# Patient Record
Sex: FEMALE | Race: WHITE | NOT HISPANIC OR LATINO | Employment: FULL TIME | URBAN - METROPOLITAN AREA
[De-identification: names, ages, dates, MRNs, and addresses within clinical notes are randomized per-mention and may not be internally consistent; named-entity substitution may affect disease eponyms.]

---

## 2018-10-31 ENCOUNTER — OFFICE VISIT (OUTPATIENT)
Dept: URGENT CARE | Facility: CLINIC | Age: 28
End: 2018-10-31
Payer: COMMERCIAL

## 2018-10-31 VITALS
HEIGHT: 63 IN | SYSTOLIC BLOOD PRESSURE: 115 MMHG | TEMPERATURE: 98 F | WEIGHT: 150 LBS | DIASTOLIC BLOOD PRESSURE: 83 MMHG | BODY MASS INDEX: 26.58 KG/M2 | RESPIRATION RATE: 16 BRPM | HEART RATE: 87 BPM | OXYGEN SATURATION: 98 %

## 2018-10-31 DIAGNOSIS — S96.911A STRAIN OF RIGHT FOOT, INITIAL ENCOUNTER: ICD-10-CM

## 2018-10-31 DIAGNOSIS — S93.401A SPRAIN OF RIGHT ANKLE, UNSPECIFIED LIGAMENT, INITIAL ENCOUNTER: ICD-10-CM

## 2018-10-31 DIAGNOSIS — M25.571 PAIN IN JOINT INVOLVING RIGHT ANKLE AND FOOT: Primary | ICD-10-CM

## 2018-10-31 PROCEDURE — 3008F BODY MASS INDEX DOCD: CPT | Mod: CPTII,S$GLB,, | Performed by: FAMILY MEDICINE

## 2018-10-31 PROCEDURE — 99203 OFFICE O/P NEW LOW 30 MIN: CPT | Mod: S$GLB,,, | Performed by: FAMILY MEDICINE

## 2018-10-31 PROCEDURE — 73610 X-RAY EXAM OF ANKLE: CPT | Mod: FY,RT,S$GLB, | Performed by: RADIOLOGY

## 2018-10-31 PROCEDURE — 73630 X-RAY EXAM OF FOOT: CPT | Mod: FY,RT,S$GLB, | Performed by: RADIOLOGY

## 2018-10-31 NOTE — LETTER
October 31, 2018      [facility]  [address] [address]  Phone: [phone]  Fax: [phone]       Patient: Geni Brownlee   YOB: 1990  Date of Visit: 10/31/2018    To Whom It May Concern:    Digna Brownlee  was at [facility] on 10/31/2018. She may return to work/school on 11/1/18 with restrictions for 2 weeks:  She may need to sit as needed and elevate right ankle and foot as needed.  If you have any questions or concerns, or if I can be of further assistance, please do not hesitate to contact me.    Sincerely,            Julissa Marion MD

## 2018-10-31 NOTE — PROGRESS NOTES
"Subjective:       Patient ID: Geni Brownlee is a 27 y.o. female.    Vitals:  height is 5' 3" (1.6 m) and weight is 68 kg (150 lb). Her temperature is 97.6 °F (36.4 °C). Her blood pressure is 115/83 and her pulse is 87. Her respiration is 16 and oxygen saturation is 98%.     Chief Complaint: Ankle Pain    Patient is local, twisted right ankle yesterday. Stepped off curb to get into [employer] and twisted ankle the wrong way. Limited ROM with pain that shoots up to her calf. Denies taking any medication for pain, only iced and elevated foot. States her calf is tingling-no numbness. Painful to touch. Denies any Hx of fractures/injuries to right ankle before.       Ankle Pain    The incident occurred 12 to 24 hours ago. The incident occurred at home. The injury mechanism was a twisting injury. The pain is present in the right ankle. Quality: Dull/tingling. The pain is at a severity of 5/10. The pain is mild. The pain has been constant since onset. Associated symptoms include a loss of motion and tingling. Pertinent negatives include no inability to bear weight, loss of sensation, muscle weakness or numbness. She has tried nothing for the symptoms.     Review of Systems   Constitution: Negative for weakness and malaise/fatigue.   HENT: Negative for nosebleeds.    Cardiovascular: Negative for chest pain and syncope.   Respiratory: Negative for shortness of breath.    Musculoskeletal: Positive for joint pain and stiffness. Negative for back pain and neck pain.   Gastrointestinal: Negative for abdominal pain.   Genitourinary: Negative for hematuria.   Neurological: Positive for tingling. Negative for dizziness and numbness.   All other systems reviewed and are negative.      Objective:      Physical Exam   Constitutional: She is oriented to person, place, and time. She appears well-developed and well-nourished. She is cooperative.  Non-toxic appearance. She does not appear ill. No distress.   HENT:   Head: Normocephalic and " atraumatic.   Right Ear: Hearing, tympanic membrane, external ear and ear canal normal.   Left Ear: Hearing, tympanic membrane, external ear and ear canal normal.   Nose: Nose normal. No mucosal edema, rhinorrhea or nasal deformity. No epistaxis. Right sinus exhibits no maxillary sinus tenderness and no frontal sinus tenderness. Left sinus exhibits no maxillary sinus tenderness and no frontal sinus tenderness.   Mouth/Throat: Uvula is midline, oropharynx is clear and moist and mucous membranes are normal. No trismus in the jaw. Normal dentition. No uvula swelling. No posterior oropharyngeal erythema.   Eyes: Conjunctivae, EOM and lids are normal. Right eye exhibits no discharge. Left eye exhibits no discharge. No scleral icterus.   Sclera clear bilat   Neck: Trachea normal, normal range of motion, full passive range of motion without pain and phonation normal. Neck supple.   Cardiovascular: Normal rate, regular rhythm, normal heart sounds, intact distal pulses and normal pulses.   Pulmonary/Chest: Effort normal and breath sounds normal. No stridor. No respiratory distress. She has no wheezes.   Abdominal: Normal appearance. She exhibits no pulsatile midline mass.   Musculoskeletal: She exhibits edema.   She is able to bear weight, wearing her slip on shoes, although with discomfort. Point tenderness under right lateral malleolus, and also over lateral aspect of midfoot.   Pain with ankle inversion, although good endpoint.  Peripheral pulses present and equal.   Neurological: She is alert and oriented to person, place, and time. She exhibits normal muscle tone. Coordination normal.   Skin: Skin is warm, dry and intact. She is not diaphoretic. No pallor.   Psychiatric: She has a normal mood and affect. Her speech is normal and behavior is normal. Judgment and thought content normal. Cognition and memory are normal.   Nursing note and vitals reviewed.      XRAY:  No fracture or dislocation  Assessment:       1. Pain in  joint involving right ankle and foot    2. Sprain of right ankle, unspecified ligament, initial encounter    3. Strain of right foot, initial encounter        Plan:         Pain in joint involving right ankle and foot  -     X-Ray Ankle Complete Right  -     X-Ray Foot Complete 3 view Right; Future; Expected date: 10/31/2018    Sprain of right ankle, unspecified ligament, initial encounter  -     ORTHOTIC DEVICE (DME)    Strain of right foot, initial encounter  -     ORTHOTIC DEVICE (DME)     IBUPROFEN 600 MG EVERY 6-8 HOURS AS NEEDED.     USE THE BOOT AS NEEDED, AND THEN ACTIVITY WITHOUT THE BOOT AS TOLERATED.    I PROVIDED A NOTE FOR WORK FOR 2 WEEKS.  SHE MAY NEED TO SIT AS NEEDED      FOLLOW-UP WITH YOUR PRIMARY CARE DOCTOR IN 2 WEEKS IF ANY DISCOMFORT AT ALL.      Make sure that you follow up with your primary care doctor in the next 2-5 days if needed .  Return to the Urgent Care if signs or symptoms change and certainly if you have worsening symptoms go to the nearest emergency department for further evaluation.

## 2018-10-31 NOTE — PATIENT INSTRUCTIONS
Treating Ankle Sprains  Treatment will depend on how bad your sprain is. For a severe sprain, healing may take 3 months or more.  Right after your injury: Use R.I.C.E.  · Rest: At first, keep weight off the ankle as much as you can. You may be given crutches to help you walk without putting weight on the ankle.  · Ice: Put an ice pack on the ankle for 15 minutes. Remove the pack and wait at least 30 minutes. Repeat for up to 3 days. This helps reduce swelling.  · Compression: To reduce swelling and keep the joint stable, you may need to wrap the ankle with an elastic bandage. For more severe sprains, you may need an ankle brace or a cast.  · Elevation: To reduce swelling, keep your ankle raised above your heart when you sit or lie down.  Medicine  Your healthcare provider may suggest oral non-steroidal anti-inflammatory medicine (NSAIDs), such as ibuprofen. This relieves the pain and helps reduce any swelling. Be sure to take your medicine as directed.  Contrast baths  After 3 days, soak your ankle in warm water for 30 seconds, then in cool water for 30 seconds. Go back and forth for 5 minutes. Doing this every 2 hours will help keep the swelling down.  Exercises    After about 2 to 3 weeks, you may be given exercises to strengthen the ligaments and muscles in the ankle. Doing these exercises will help prevent another ankle sprain. Exercises may include standing on your toes and then on your heels and doing ankle curls.  Ankle curls  · Sit on the edge of a sturdy table or lie on your back.  · Pull your toes toward you. Then point them away from you. Repeat for 2 to 3 minutes.   Date Last Reviewed: 9/28/2015  © 5342-7734 [employer]. [address] [place] [address], [place] [address]. All rights reserved. This information is not intended as a substitute for professional medical care. Always follow your healthcare professional's instructions.    IBUPROFEN 600 MG EVERY 6-8 HOURS AS NEEDED    USE THE BOOT AS  NEEDED, AND THEN ACTIVITY WITHOUT THE BOOT AS TOLERATED.    FOLLOW-UP WITH YOUR PRIMARY CARE DOCTOR IN 2 WEEKS IF ANY DISCOMFORT AT ALL.      Make sure that you follow up with your primary care doctor in the next 2-5 days if needed .  Return to the Urgent Care if signs or symptoms change and certainly if you have worsening symptoms go to the nearest emergency department for further evaluation.

## 2018-11-03 ENCOUNTER — TELEPHONE (OUTPATIENT)
Dept: URGENT CARE | Facility: CLINIC | Age: 28
End: 2018-11-03

## 2019-01-26 ENCOUNTER — OFFICE VISIT (OUTPATIENT)
Dept: URGENT CARE | Facility: CLINIC | Age: 29
End: 2019-01-26
Payer: COMMERCIAL

## 2019-01-26 VITALS
HEART RATE: 88 BPM | SYSTOLIC BLOOD PRESSURE: 132 MMHG | HEIGHT: 63 IN | BODY MASS INDEX: 26.58 KG/M2 | OXYGEN SATURATION: 97 % | TEMPERATURE: 98 F | WEIGHT: 150 LBS | DIASTOLIC BLOOD PRESSURE: 87 MMHG | RESPIRATION RATE: 16 BRPM

## 2019-01-26 DIAGNOSIS — H01.001 BLEPHARITIS OF RIGHT UPPER EYELID, UNSPECIFIED TYPE: Primary | ICD-10-CM

## 2019-01-26 PROCEDURE — 3008F BODY MASS INDEX DOCD: CPT | Mod: CPTII,S$GLB,, | Performed by: NURSE PRACTITIONER

## 2019-01-26 PROCEDURE — 99214 PR OFFICE/OUTPT VISIT, EST, LEVL IV, 30-39 MIN: ICD-10-PCS | Mod: S$GLB,,, | Performed by: NURSE PRACTITIONER

## 2019-01-26 PROCEDURE — 99214 OFFICE O/P EST MOD 30 MIN: CPT | Mod: S$GLB,,, | Performed by: NURSE PRACTITIONER

## 2019-01-26 PROCEDURE — 3008F PR BODY MASS INDEX (BMI) DOCUMENTED: ICD-10-PCS | Mod: CPTII,S$GLB,, | Performed by: NURSE PRACTITIONER

## 2019-01-26 RX ORDER — POLYMYXIN B SULFATE AND TRIMETHOPRIM 1; 10000 MG/ML; [USP'U]/ML
1 SOLUTION OPHTHALMIC EVERY 4 HOURS
Qty: 2 ML | Refills: 0 | Status: SHIPPED | OUTPATIENT
Start: 2019-01-26 | End: 2019-01-31

## 2019-01-26 NOTE — PROGRESS NOTES
"Subjective:       Patient ID: Geni Brownlee is a 28 y.o. female.    Vitals:  height is 5' 3" (1.6 m) and weight is 68 kg (150 lb). Her temperature is 98.1 °F (36.7 °C). Her blood pressure is 132/87 and her pulse is 88. Her respiration is 16 and oxygen saturation is 97%.     Chief Complaint: Eye Problem (Right eye)    Patient is local, here for right eye issue. States 3-4 days eye has been swollen. States her eye lid is painful-hurts to blink. Has a white dot on the upper lid the other day and it went away. Eye is slightly itchy, had some discharge and crustiness when she woke up this morning. Tried using a ice pack last night with no relief. Denies trying antihistamine for symptoms. Pain has gotten worse since it started/swelling. Eye was sensitive to light last night but has gone away now.       Eye Problem    The right eye is affected. This is a new problem. The current episode started in the past 7 days. The problem occurs constantly. The problem has been unchanged. There was no injury mechanism. The pain is at a severity of 6/10. The pain is mild. There is no known exposure to pink eye. She does not wear contacts. Associated symptoms include an eye discharge and itching. Pertinent negatives include no blurred vision, double vision, eye redness, fever, foreign body sensation, nausea, photophobia, recent URI or vomiting. Treatments tried: ice pack. The treatment provided no relief.       Constitution: Negative for chills and fever.   HENT: Negative for congestion and sinus pain.    Eyes: Positive for eye discharge, eye itching, eye pain and eyelid swelling. Negative for eye trauma, foreign body in eye, eye redness, photophobia, vision loss, double vision and blurred vision.   Gastrointestinal: Negative for nausea and vomiting.   Genitourinary: Negative for history of kidney stones.   Skin: Negative for rash.   Allergic/Immunologic: Negative for seasonal allergies and itching.   Neurological: Negative for " headaches.       Objective:      Physical Exam   Constitutional: She is oriented to person, place, and time. She appears well-developed and well-nourished.   HENT:   Head: Normocephalic and atraumatic.   Right Ear: External ear normal.   Left Ear: External ear normal.   Nose: Nose normal.   Mouth/Throat: Oropharynx is clear and moist.   Eyes: Conjunctivae and EOM are normal. Pupils are equal, round, and reactive to light. Lids are everted and swept, no foreign bodies found.       Neck: Trachea normal, full passive range of motion without pain and phonation normal. Neck supple.   Musculoskeletal: Normal range of motion.   Neurological: She is alert and oriented to person, place, and time.   Skin: Skin is warm, dry and intact.   Psychiatric: She has a normal mood and affect. Her speech is normal and behavior is normal. Judgment and thought content normal. Cognition and memory are normal.   Nursing note and vitals reviewed.      Assessment:       1. Blepharitis of right upper eyelid, unspecified type        Plan:         Blepharitis of right upper eyelid, unspecified type    Other orders  -     polymyxin B sulf-trimethoprim (POLYTRIM) 10,000 unit- 1 mg/mL Drop; Place 1 drop into both eyes every 4 (four) hours. for 5 days  Dispense: 2 mL; Refill: 0        Treating Blepharitis: Self-Care    To treat the problem, keep your eyelids clean. Warm compresses can reduce redness and swelling, and help clean your eyelids, too. You may also need to wash the area gently with an eyelid scrub when you wake up.  To apply a warm compress:  1. Wash your hands with soap and warm water.  2. Wet a clean washcloth with warm water. Then wring it out.  3. Close your eyes and place the washcloth over your eyelids for 3 to 5 minutes. This helps loosen scales or crusts.  4. Wet the washcloth again as often as needed to keep it warm.  Repeat 2 or more times a day. Use a clean washcloth each time.     To use an eyelid scrub:  1. Wash your hands  with soap and warm water.  2. Use a ready-made eyelid scrub. Or mix 3 drops of baby shampoo in 1/4 cup of warm water.  3. Dip a lint-free pad, cotton swab, or clean washcloth in the scrub.  4. Close one eye and gently scrub the base of the eyelid.  5. Rinse the lid in cool water and dry with a clean towel.  6. Repeat on your other eye.  Date Last Reviewed: 6/6/2015  © 4229-1681 [employer]. [address] [place] [address]. All rights reserved. This information is not intended as a substitute for professional medical care. Always follow your healthcare professional's instructions.      EYE   If your condition worsens or fails to improve we recommend that you receive another evaluation at the ER immediately or contact your PCP to discuss your concerns or return here. You must understand that you've received an urgent care treatment only and that you may be released before all your medical problems are known or treated. You the patient will arrange for followup care as instructed.   Use the eye drops as prescribed while awake initially. Use the eye drops for 24 hours after the last day of eye symptoms.   Do not wear your contact lens ( if you use them) for at least 5 days after you stop having symptoms and are rechecked by your doctor. Throw away the contacts, contact solution and carrying case you were using and start with new material. You may also need to throw away any eye makeup/mascara that you used while your eye was irritated.  If you develop increase eye symptoms or change in your vision seek medical care immediately either with your ophthalomologist or the ER or return here.

## 2019-01-26 NOTE — PATIENT INSTRUCTIONS
Treating Blepharitis: Self-Care    To treat the problem, keep your eyelids clean. Warm compresses can reduce redness and swelling, and help clean your eyelids, too. You may also need to wash the area gently with an eyelid scrub when you wake up.  To apply a warm compress:  1. Wash your hands with soap and warm water.  2. Wet a clean washcloth with warm water. Then wring it out.  3. Close your eyes and place the washcloth over your eyelids for 3 to 5 minutes. This helps loosen scales or crusts.  4. Wet the washcloth again as often as needed to keep it warm.  Repeat 2 or more times a day. Use a clean washcloth each time.     To use an eyelid scrub:  1. Wash your hands with soap and warm water.  2. Use a ready-made eyelid scrub. Or mix 3 drops of baby shampoo in 1/4 cup of warm water.  3. Dip a lint-free pad, cotton swab, or clean washcloth in the scrub.  4. Close one eye and gently scrub the base of the eyelid.  5. Rinse the lid in cool water and dry with a clean towel.  6. Repeat on your other eye.  Date Last Reviewed: 6/6/2015  © 9177-0066 [employer]. [address] [place] [address], [place] [address]. All rights reserved. This information is not intended as a substitute for professional medical care. Always follow your healthcare professional's instructions.      EYE   If your condition worsens or fails to improve we recommend that you receive another evaluation at the ER immediately or contact your PCP to discuss your concerns or return here. You must understand that you've received an urgent care treatment only and that you may be released before all your medical problems are known or treated. You the patient will arrange for followup care as instructed.   Use the eye drops as prescribed while awake initially. Use the eye drops for 24 hours after the last day of eye symptoms.   Do not wear your contact lens ( if you use them) for at least 5 days after you stop having symptoms and are rechecked by your  doctor. Throw away the contacts, contact solution and carrying case you were using and start with new material. You may also need to throw away any eye makeup/mascara that you used while your eye was irritated.  If you develop increase eye symptoms or change in your vision seek medical care immediately either with your ophthalomologist or the ER or return here.

## 2019-01-29 ENCOUNTER — TELEPHONE (OUTPATIENT)
Dept: URGENT CARE | Facility: CLINIC | Age: 29
End: 2019-01-29

## 2019-07-22 ENCOUNTER — OFFICE VISIT (OUTPATIENT)
Dept: URGENT CARE | Facility: CLINIC | Age: 29
End: 2019-07-22
Payer: COMMERCIAL

## 2019-07-22 VITALS
OXYGEN SATURATION: 98 % | WEIGHT: 150 LBS | BODY MASS INDEX: 26.58 KG/M2 | HEART RATE: 86 BPM | DIASTOLIC BLOOD PRESSURE: 92 MMHG | SYSTOLIC BLOOD PRESSURE: 142 MMHG | RESPIRATION RATE: 18 BRPM | TEMPERATURE: 98 F | HEIGHT: 63 IN

## 2019-07-22 DIAGNOSIS — L73.9 FOLLICULITIS: Primary | ICD-10-CM

## 2019-07-22 PROCEDURE — 99214 PR OFFICE/OUTPT VISIT, EST, LEVL IV, 30-39 MIN: ICD-10-PCS | Mod: S$GLB,,, | Performed by: SURGERY

## 2019-07-22 PROCEDURE — 3008F BODY MASS INDEX DOCD: CPT | Mod: CPTII,S$GLB,, | Performed by: SURGERY

## 2019-07-22 PROCEDURE — 99214 OFFICE O/P EST MOD 30 MIN: CPT | Mod: S$GLB,,, | Performed by: SURGERY

## 2019-07-22 PROCEDURE — 3008F PR BODY MASS INDEX (BMI) DOCUMENTED: ICD-10-PCS | Mod: CPTII,S$GLB,, | Performed by: SURGERY

## 2019-07-22 RX ORDER — DOXYCYCLINE 100 MG/1
100 CAPSULE ORAL 2 TIMES DAILY
Qty: 14 CAPSULE | Refills: 0 | Status: SHIPPED | OUTPATIENT
Start: 2019-07-22 | End: 2019-07-29

## 2019-07-22 RX ORDER — MUPIROCIN 20 MG/G
OINTMENT TOPICAL DAILY
Qty: 1 TUBE | Refills: 0 | Status: SHIPPED | OUTPATIENT
Start: 2019-07-22 | End: 2021-08-24

## 2019-07-22 NOTE — PATIENT INSTRUCTIONS
Folliculitis  Folliculitis is an inflammation of a hair follicle. A hair follicle is the little pocket where a hair grows out of the skin. Bacteria normally live on the skin. But sometimes bacteria can get trapped in a follicle and cause infection. This causes a bumpy rash. The area over the follicles is red and raised. It may itch or be painful. The bumps may have fluid (pus) inside. The pus may leak and then form crusts. Sores can spread to other areas of the body. Once it goes away, folliculitis can come back at any time. Severe cases may cause permanent hair loss and scarring.  Folliculitis can happen anywhere on the body where hair grows. It can be caused by rubbing from tight clothing. Ingrown hairs can cause it. Soaking in a hot tub or swimming pool that has bacteria in the water can cause it. It may also occur if a hair follicle is blocked by a bandage.  Sores often go away in a few days with no treatment. In some cases, medicine may be given. A small piece of skin or pus may be taken to find the type of bacteria causing the infection.  Home care  The healthcare provider may prescribe an antibiotic cream or ointment.  Oral antibiotics may also be prescribed. Or you may be told to use an over-the-counter antibiotic cream. Follow all instructions when using any of these medicines.  General care:  · Apply warm, moist compresses to the sores for 20 minutes up to 3 times a day. You can make a compress by soaking a cloth in warm water. Squeeze out excess water.  · Dont cut, poke, or squeeze the sores. This can be painful and spread infection.  · Dont scratch the affected area. Scratching can delay healing.  · Dont shave the areas affected by folliculitis.  · If the sores leak fluid, cover the area with a nonstick gauze bandage. Use as little tape as possible. Carefully discard all soiled bandages.  · Dress in loose cotton clothing.  · Change sheets and blankets if they are soiled by pus. Wash all clothes,  towels, sheets, and cloth diapers in soap and hot water. Do not share clothes, towels, or sheets with other family members.  · Do not soak the sores in bath water. This can spread infection. Instead, keep the area clean by gently washing sores with soap and warm water.  · Wash your hands or use antibacterial gels often to prevent spreading the bacteria.  Follow-up care  Follow up with your healthcare provider, or as advised.  When to seek medical advice  Call your healthcare provider right away if any of these occur:  · Fever of 100.4°F (38°C) or higher  · Spreading of the rash  · Rash does not get better with treatment  · Redness or swelling that gets worse  · Rash becomes more painful  · Foul-smelling fluid leaking from the skin  · Rash improves, but then comes back   Date Last Reviewed: 11/1/2016  © 6624-9553 [employer]. [address] [place] [address], [place] [address]. All rights reserved. This information is not intended as a substitute for professional medical care. Always follow your healthcare professional's instructions.

## 2019-07-22 NOTE — PROGRESS NOTES
"Subjective:       Patient ID: Geni Brownlee is a 28 y.o. female.    Vitals:  height is 5' 3" (1.6 m) and weight is 68 kg (150 lb). Her oral temperature is 98.1 °F (36.7 °C). Her blood pressure is 142/92 (abnormal) and her pulse is 86. Her respiration is 18 and oxygen saturation is 98%.     Chief Complaint: Rash    Rash   This is a new problem. The current episode started in the past 7 days (Saturday). The problem has been gradually worsening since onset. The affected locations include the groin and abdomen. The rash is characterized by pain and redness. It is unknown if there was an exposure to a precipitant. Pertinent negatives include no cough, fever or sore throat. Past treatments include antibiotic cream. The treatment provided no relief.       Constitution: Negative for chills and fever.   HENT: Negative for facial swelling and sore throat.    Neck: Negative for painful lymph nodes.   Eyes: Negative for eye itching and eyelid swelling.   Respiratory: Negative for cough.    Musculoskeletal: Positive for pain. Negative for joint pain and joint swelling.   Skin: Positive for rash. Negative for color change, pale, wound, abrasion, laceration, lesion, skin thickening/induration, puncture wound, erythema, bruising, abscess, avulsion and hives.   Allergic/Immunologic: Negative for environmental allergies, immunocompromised state and hives.   Hematologic/Lymphatic: Negative for swollen lymph nodes.       Objective:      Physical Exam   Constitutional: She is oriented to person, place, and time. She appears well-developed and well-nourished.   HENT:   Head: Normocephalic and atraumatic. Head is without abrasion, without contusion and without laceration.   Right Ear: External ear normal.   Left Ear: External ear normal.   Nose: Nose normal.   Mouth/Throat: Oropharynx is clear and moist.   Eyes: Pupils are equal, round, and reactive to light. Conjunctivae, EOM and lids are normal.   Neck: Trachea normal, full passive " range of motion without pain and phonation normal. Neck supple.   Cardiovascular: Normal rate, regular rhythm and normal heart sounds.   Pulmonary/Chest: Effort normal and breath sounds normal. No stridor. No respiratory distress.   Musculoskeletal: Normal range of motion.   Neurological: She is alert and oriented to person, place, and time.   Skin: Skin is warm, dry and intact. Capillary refill takes less than 2 seconds. Rash noted. No abrasion, no bruising, no burn, no ecchymosis, no laceration and no lesion noted. Rash is not pustular. No erythema.        Psychiatric: She has a normal mood and affect. Her speech is normal and behavior is normal. Judgment and thought content normal. Cognition and memory are normal.   Nursing note and vitals reviewed.      Assessment:       1. Folliculitis        Plan:         Folliculitis  -     doxycycline (VIBRAMYCIN) 100 MG Cap; Take 1 capsule (100 mg total) by mouth 2 (two) times daily. for 7 days  Dispense: 14 capsule; Refill: 0  -     mupirocin (BACTROBAN) 2 % ointment; Apply topically once daily.  Dispense: 1 Tube; Refill: 0

## 2020-06-29 ENCOUNTER — OFFICE VISIT (OUTPATIENT)
Dept: URGENT CARE | Facility: CLINIC | Age: 30
End: 2020-06-29
Payer: COMMERCIAL

## 2020-06-29 VITALS
HEART RATE: 99 BPM | SYSTOLIC BLOOD PRESSURE: 133 MMHG | TEMPERATURE: 99 F | OXYGEN SATURATION: 98 % | BODY MASS INDEX: 28.35 KG/M2 | DIASTOLIC BLOOD PRESSURE: 80 MMHG | HEIGHT: 63 IN | WEIGHT: 160 LBS

## 2020-06-29 DIAGNOSIS — R06.02 SOB (SHORTNESS OF BREATH): ICD-10-CM

## 2020-06-29 DIAGNOSIS — R00.2 PALPITATIONS: ICD-10-CM

## 2020-06-29 DIAGNOSIS — R00.0 TACHYCARDIA: Primary | ICD-10-CM

## 2020-06-29 PROCEDURE — 71046 XR CHEST PA AND LATERAL: ICD-10-PCS | Mod: FY,S$GLB,, | Performed by: RADIOLOGY

## 2020-06-29 PROCEDURE — 99214 PR OFFICE/OUTPT VISIT, EST, LEVL IV, 30-39 MIN: ICD-10-PCS | Mod: S$GLB,,, | Performed by: NURSE PRACTITIONER

## 2020-06-29 PROCEDURE — 71046 X-RAY EXAM CHEST 2 VIEWS: CPT | Mod: FY,S$GLB,, | Performed by: RADIOLOGY

## 2020-06-29 PROCEDURE — 99214 OFFICE O/P EST MOD 30 MIN: CPT | Mod: S$GLB,,, | Performed by: NURSE PRACTITIONER

## 2020-06-29 NOTE — PATIENT INSTRUCTIONS
"  Heart Palpitations    Palpitations are the feeling that your heart is beating hard, fast, or irregular. Some describe it as "pounding" or "skipped beats." Palpitations may occur in someone with heart disease, but can also occur in a healthy person.  Heart-related causes:  · Arrhythmia (a change from the heart's normal rhythm)  · Heart valve disease  · Disease of the heart muscle  · Coronary artery disease  · High blood pressure  Non-heart-related causes:  · Certain medicines (such as asthma inhalers and decongestants)  · Some herbal supplements, energy drinks and pills, and weight loss pills  · Illegal stimulant drugs (such as cocaine, crank, methamphetamine, PCP, bath salts, ecstasy)  · Caffeine, alcohol, and tobacco  · Medical conditions such as thyroid disease, anemia, anxiety, and panic disorder  Sometimes the cause can't be found.  Home care  Follow these home care tips:  · Avoid excess caffeine, alcohol, tobacco, and any stimulant drugs.  · Tell your doctor about any prescription or over-the-counter or herbal medicines you take.  Follow-up care  · Follow up with your doctor, or as advised.  Call 911  This is the fastest and safest way to get to the emergency department. The paramedics can also begin treatment on the way to the hospital, if needed.  Don't wait until your symptoms are severe to call 911. These are reasons to call 911:  · Chest pain  · Shortness of breath  · Feeling lightheaded, faint, or dizzy  · Fainting or loss of consciousness  · Very irregular heartbeat  · Rapid heartbeat that makes you uncomfortable  · Slower than usual heart rate associated with symptoms  · Slower than usual heart rate  · Chest pain with weakness, dizziness, heavy sweating, nausea, or vomiting  · Extreme drowsiness or confusion  · Weakness of an arm or leg, or on 1 side of the face  · Difficulty with speech or vision  When to seek medical advice  Get prompt medical attention if you have palpitations and any of the " following:  · Weakness  · Dizziness  · Lightheadedness  · Fainting  Date Last Reviewed: 4/27/2016  © 8872-1678 [employer]. [address] [place] [address], [place] [address]. All rights reserved. This information is not intended as a substitute for professional medical care. Always follow your healthcare professional's instructions.        Shortness of Breath (Dyspnea)  Shortness of breath is the feeling that you can't catch your breath or get enough air. It is also known as dyspnea.  Dyspnea can be caused by many different conditions. They include:  · Acute asthma attack.  · Worsening of chronic lung diseases such as chronic bronchitis and emphysema.  · Heart failure. This is when weak heart muscle allows extra fluid to collect in the lungs.  · Panic attacks or anxiety. Fear can cause rapid breathing (hyperventilation).  · Pneumonia, or an infection in the lung tissue.  · Exposure to toxic substances, fumes, smoke, or certain medicines.  · Blood clot in the lung (pulmonary embolism). This is often from a piece of blood clot in a deep vein of the leg (deep vein thrombosis) that breaks off and travels to the lungs.  · Heart attack or heart-related chest pain (angina).  · Anemia.  · Collapsed lung (pneumothorax).  · Dehydration.  · Pregnancy.  Based on your visit today, the exact cause of your shortness of breath is not certain. Your tests dont show any of the serious causes of dyspnea. You may need other tests to find out if you have a serious problem. Its important to watch for any new symptoms or symptoms that get worse. Follow up with your healthcare provider as directed.  Home care  Follow these tips to take care of yourself at home:  · When your symptoms are better, go back to your usual activities.  · If you smoke, you should stop. Join a quit-smoking program or ask your healthcare provider for help.  · Eat a healthy diet and get plenty of sleep.  · Get regular exercise. Talk with your healthcare  provider before starting to exercise, especially if you have other medical problems.  · Cut down on the amount of caffeine and stimulants you consume.  Follow-up care  Follow up with your healthcare provider, or as advised.  If tests were done, you will be told if your treatment needs to be changed. You can call as directed for the results.  (Note: If an X-ray was taken, a specialist will review it. You will be notified of any new findings that may affect your care.)  Call 911 or get immediate medical care  Shortness of breath may be a sign of a serious medical problem. For example, it may be a problem with your heart or lungs. Call 911 if you have worsening shortness of breath or trouble breathing, especially with any of the symptoms below:  · You are confused or its difficult to wake you.  · You faint or lose consciousness.  · You have a fast heartbeat, or your heartbeat is irregular.  · You are coughing up blood.  · You have pain in your chest, arm, shoulder, neck, or upper back.  · You break out in a sweat.  When to seek medical advice  Call your healthcare provider right away if any of these occur:  · Slight shortness of breath or wheezing  · Redness, pain or swelling in your leg, arm, or other body area  · Swelling in both legs or ankles  · Fast weight gain  · Dizziness or weakness  · Fever of 100.4ºF (38ºC) or higher, or as directed by your healthcare provider  Date Last Reviewed: 9/13/2015 © 2000-2017 [employer]. [address] [place] [address], [place] [address]. All rights reserved. This information is not intended as a substitute for professional medical care. Always follow your healthcare professional's instructions.

## 2020-06-29 NOTE — PROGRESS NOTES
"Subjective:       Patient ID: Geni Brownlee is a 29 y.o. female.    Vitals:  height is 5' 3" (1.6 m) and weight is 72.6 kg (160 lb). Her temperature is 98.8 °F (37.1 °C). Her blood pressure is 133/80 and her pulse is 99. Her oxygen saturation is 98%.     Chief Complaint: Tachycardia    Pt reports palpitations and SOB x 3-4 days. Symptoms are intermittent and mostly happen with movement or activity. Relieved once she sits down, takes deep breaths, and talks herself through it. No SOB or palpitations currently. She denies a hx or anxiety. No fever, chills, cough, CP, abd pain, n/v/d, anosmia. She got tested for covid several days ago at [facility] but hasn't gotten results yet. No known covid exposure. No cardiac hx.     Shortness of Breath  This is a new problem. Episode onset: 3-4 days. The problem occurs intermittently. The problem has been gradually improving. Pertinent negatives include no chest pain, fever, headaches, leg swelling, rash, sore throat or vomiting.       Constitution: Negative for chills, fatigue and fever.   HENT: Negative for congestion and sore throat.    Neck: Negative for painful lymph nodes.   Cardiovascular: Positive for palpitations. Negative for chest pain and leg swelling.   Eyes: Negative for double vision and blurred vision.   Respiratory: Positive for shortness of breath. Negative for cough.    Gastrointestinal: Negative for nausea, vomiting and diarrhea.   Genitourinary: Negative for dysuria, frequency, urgency and history of kidney stones.   Musculoskeletal: Negative for joint pain, joint swelling, muscle cramps and muscle ache.   Skin: Negative for color change, pale, rash and bruising.   Allergic/Immunologic: Negative for seasonal allergies.   Neurological: Negative for dizziness, history of vertigo, light-headedness, passing out and headaches.   Hematologic/Lymphatic: Negative for swollen lymph nodes.   Psychiatric/Behavioral: Negative for nervous/anxious, sleep disturbance and " depression. The patient is not nervous/anxious.        Objective:      Physical Exam   Constitutional: She is oriented to person, place, and time. She appears well-developed. She is cooperative.  Non-toxic appearance. She does not appear ill. No distress.   HENT:   Head: Normocephalic and atraumatic.   Right Ear: Hearing, tympanic membrane, external ear and ear canal normal.   Left Ear: Hearing, tympanic membrane, external ear and ear canal normal.   Nose: Nose normal. No mucosal edema, rhinorrhea, nasal deformity or congestion. No epistaxis. Right sinus exhibits no maxillary sinus tenderness and no frontal sinus tenderness. Left sinus exhibits no maxillary sinus tenderness and no frontal sinus tenderness.   Mouth/Throat: Uvula is midline, oropharynx is clear and moist and mucous membranes are normal. Mucous membranes are moist. No trismus in the jaw. Normal dentition. No uvula swelling. No oropharyngeal exudate, posterior oropharyngeal edema or posterior oropharyngeal erythema.   Eyes: Pupils are equal, round, and reactive to light. Conjunctivae and lids are normal. No scleral icterus.   Neck: Trachea normal, normal range of motion, full passive range of motion without pain and phonation normal. Neck supple. No neck rigidity. No edema and no erythema present.   Cardiovascular: Normal rate, regular rhythm, normal heart sounds and normal pulses.   Pulmonary/Chest: Effort normal and breath sounds normal. No respiratory distress. She has no decreased breath sounds. She has no wheezes. She has no rhonchi. She has no rales.   Abdominal: Normal appearance.   Musculoskeletal: Normal range of motion.         General: No deformity.   Lymphadenopathy:     She has no cervical adenopathy.   Neurological: She is alert and oriented to person, place, and time. She displays no weakness. No cranial nerve deficit or sensory deficit. She exhibits normal muscle tone. Coordination normal.   Skin: Skin is warm, dry, intact, not  "diaphoretic and not pale.   Psychiatric: Her speech is normal and behavior is normal. Judgment and thought content normal.   Nursing note and vitals reviewed.  X-ray Chest Pa And Lateral    Result Date: 6/29/2020  EXAMINATION: XR CHEST PA AND LATERAL CLINICAL HISTORY: Shortness of breath TECHNIQUE: PA and lateral views of the chest were performed. COMPARISON: None FINDINGS: Generous breast tissue causes x-ray beam attenuation and scatter overlying the lower lung zones on PA view. Mediastinal structures are midline. Cardiac silhouette and pulmonary vascular distribution are normal. Lung volumes are normal and symmetric. I detect no pulmonary disease, pleural fluid, lymph node enlargement, cardiac decompensation, pneumothorax, pneumomediastinum, pneumoperitoneum or significant osseous abnormality.     No source for shortness of breath identified. Electronically signed by: Cierra Galicia MD Date:    06/29/2020 Time:    11:26  EKG prelim read: NSR      Assessment:       1. Tachycardia    2. SOB (shortness of breath)    3. Palpitations        Plan:         Tachycardia  -     EKG 12-lead; Future; Expected date: 06/29/2020  -     Ambulatory referral/consult to Cardiology    SOB (shortness of breath)  -     X-Ray Chest PA And Lateral; Future; Expected date: 06/29/2020  -     Ambulatory referral/consult to Cardiology    Palpitations  -     Ambulatory referral/consult to Cardiology          Reviewed previous pertinent office visits, PMH, PSH, fam hx  EKG and chest xray normal today  Cardiology referral placed  Strict ER precautions discussed  Advised on return/follow-up precautions. Advised on ER precautions. Answered all patient questions. Patient verbalized understanding and voiced agreement with current treatment plan.    Patient Instructions     Heart Palpitations    Palpitations are the feeling that your heart is beating hard, fast, or irregular. Some describe it as "pounding" or "skipped beats." Palpitations may occur " in someone with heart disease, but can also occur in a healthy person.  Heart-related causes:  · Arrhythmia (a change from the heart's normal rhythm)  · Heart valve disease  · Disease of the heart muscle  · Coronary artery disease  · High blood pressure  Non-heart-related causes:  · Certain medicines (such as asthma inhalers and decongestants)  · Some herbal supplements, energy drinks and pills, and weight loss pills  · Illegal stimulant drugs (such as cocaine, crank, methamphetamine, PCP, bath salts, ecstasy)  · Caffeine, alcohol, and tobacco  · Medical conditions such as thyroid disease, anemia, anxiety, and panic disorder  Sometimes the cause can't be found.  Home care  Follow these home care tips:  · Avoid excess caffeine, alcohol, tobacco, and any stimulant drugs.  · Tell your doctor about any prescription or over-the-counter or herbal medicines you take.  Follow-up care  · Follow up with your doctor, or as advised.  Call 911  This is the fastest and safest way to get to the emergency department. The paramedics can also begin treatment on the way to the hospital, if needed.  Don't wait until your symptoms are severe to call 911. These are reasons to call 911:  · Chest pain  · Shortness of breath  · Feeling lightheaded, faint, or dizzy  · Fainting or loss of consciousness  · Very irregular heartbeat  · Rapid heartbeat that makes you uncomfortable  · Slower than usual heart rate associated with symptoms  · Slower than usual heart rate  · Chest pain with weakness, dizziness, heavy sweating, nausea, or vomiting  · Extreme drowsiness or confusion  · Weakness of an arm or leg, or on 1 side of the face  · Difficulty with speech or vision  When to seek medical advice  Get prompt medical attention if you have palpitations and any of the following:  · Weakness  · Dizziness  · Lightheadedness  · Fainting  Date Last Reviewed: 4/27/2016  © 6179-7894 [employer]. [address] [place] [address], [place] [address]. All  rights reserved. This information is not intended as a substitute for professional medical care. Always follow your healthcare professional's instructions.        Shortness of Breath (Dyspnea)  Shortness of breath is the feeling that you can't catch your breath or get enough air. It is also known as dyspnea.  Dyspnea can be caused by many different conditions. They include:  · Acute asthma attack.  · Worsening of chronic lung diseases such as chronic bronchitis and emphysema.  · Heart failure. This is when weak heart muscle allows extra fluid to collect in the lungs.  · Panic attacks or anxiety. Fear can cause rapid breathing (hyperventilation).  · Pneumonia, or an infection in the lung tissue.  · Exposure to toxic substances, fumes, smoke, or certain medicines.  · Blood clot in the lung (pulmonary embolism). This is often from a piece of blood clot in a deep vein of the leg (deep vein thrombosis) that breaks off and travels to the lungs.  · Heart attack or heart-related chest pain (angina).  · Anemia.  · Collapsed lung (pneumothorax).  · Dehydration.  · Pregnancy.  Based on your visit today, the exact cause of your shortness of breath is not certain. Your tests dont show any of the serious causes of dyspnea. You may need other tests to find out if you have a serious problem. Its important to watch for any new symptoms or symptoms that get worse. Follow up with your healthcare provider as directed.  Home care  Follow these tips to take care of yourself at home:  · When your symptoms are better, go back to your usual activities.  · If you smoke, you should stop. Join a quit-smoking program or ask your healthcare provider for help.  · Eat a healthy diet and get plenty of sleep.  · Get regular exercise. Talk with your healthcare provider before starting to exercise, especially if you have other medical problems.  · Cut down on the amount of caffeine and stimulants you consume.  Follow-up care  Follow up with your  healthcare provider, or as advised.  If tests were done, you will be told if your treatment needs to be changed. You can call as directed for the results.  (Note: If an X-ray was taken, a specialist will review it. You will be notified of any new findings that may affect your care.)  Call 911 or get immediate medical care  Shortness of breath may be a sign of a serious medical problem. For example, it may be a problem with your heart or lungs. Call 911 if you have worsening shortness of breath or trouble breathing, especially with any of the symptoms below:  · You are confused or its difficult to wake you.  · You faint or lose consciousness.  · You have a fast heartbeat, or your heartbeat is irregular.  · You are coughing up blood.  · You have pain in your chest, arm, shoulder, neck, or upper back.  · You break out in a sweat.  When to seek medical advice  Call your healthcare provider right away if any of these occur:  · Slight shortness of breath or wheezing  · Redness, pain or swelling in your leg, arm, or other body area  · Swelling in both legs or ankles  · Fast weight gain  · Dizziness or weakness  · Fever of 100.4ºF (38ºC) or higher, or as directed by your healthcare provider  Date Last Reviewed: 9/13/2015  © 0351-8138 [employer]. [address] [place] [address], [place] [address]. All rights reserved. This information is not intended as a substitute for professional medical care. Always follow your healthcare professional's instructions.

## 2020-07-01 ENCOUNTER — OFFICE VISIT (OUTPATIENT)
Dept: CARDIOLOGY | Facility: CLINIC | Age: 30
End: 2020-07-01
Payer: COMMERCIAL

## 2020-07-01 VITALS
RESPIRATION RATE: 15 BRPM | WEIGHT: 203.5 LBS | BODY MASS INDEX: 36.06 KG/M2 | DIASTOLIC BLOOD PRESSURE: 70 MMHG | SYSTOLIC BLOOD PRESSURE: 122 MMHG | HEART RATE: 99 BPM | OXYGEN SATURATION: 98 % | HEIGHT: 63 IN

## 2020-07-01 DIAGNOSIS — E66.9 NON MORBID OBESITY, UNSPECIFIED OBESITY TYPE: ICD-10-CM

## 2020-07-01 DIAGNOSIS — Z72.0 TOBACCO ABUSE: ICD-10-CM

## 2020-07-01 DIAGNOSIS — R07.9 CHEST PAIN, UNSPECIFIED TYPE: ICD-10-CM

## 2020-07-01 DIAGNOSIS — R60.0 LEG EDEMA, RIGHT: ICD-10-CM

## 2020-07-01 DIAGNOSIS — R00.2 HEART PALPITATIONS: Primary | ICD-10-CM

## 2020-07-01 PROCEDURE — 99999 PR PBB SHADOW E&M-EST. PATIENT-LVL IV: CPT | Mod: PBBFAC,,, | Performed by: INTERNAL MEDICINE

## 2020-07-01 PROCEDURE — 99204 OFFICE O/P NEW MOD 45 MIN: CPT | Mod: S$GLB,,, | Performed by: INTERNAL MEDICINE

## 2020-07-01 PROCEDURE — 99204 PR OFFICE/OUTPT VISIT, NEW, LEVL IV, 45-59 MIN: ICD-10-PCS | Mod: S$GLB,,, | Performed by: INTERNAL MEDICINE

## 2020-07-01 PROCEDURE — 99999 PR PBB SHADOW E&M-EST. PATIENT-LVL IV: ICD-10-PCS | Mod: PBBFAC,,, | Performed by: INTERNAL MEDICINE

## 2020-07-01 NOTE — PROGRESS NOTES
CARDIOVASCULAR CONSULTATION    REASON FOR CONSULT:   Geni Brownlee is a 29 y.o. female who presents for CP/palps.    Req: LINDA Valdes  HISTORY OF PRESENT ILLNESS:   The patient is a pleasant 29-year-old woman with no prior cardiac history referred by her primary care team for evaluation palpitations and chest pain.  She reports the onset of palpitations several weeks ago, followed by exertional chest discomfort and associated palpitations.  She has had no haven syncope but did describe some lightheadedness.  She stated this got particularly bad which prompted her visit with primary care several days ago.  She otherwise denies shortness of breath, PND, orthopnea, melena, hematuria, or claudicant symptoms.  There is no unusual vaginal bleeding, the patient states she is not pregnant.  She does describe intermittent right lower extremity edema which appears to be dependent in nature.    Family history is notable for mother with atrial fibrillation and a father with premature CAD.    The patient is a current smoking and I have strongly encouraged her to quit and have referred her to the Ambulatory smoking cessation program.  She denies alcohol or caffeine excess nor any illicit drug use.    CARDIOVASCULAR HISTORY:   none    PAST MEDICAL HISTORY:     Past Medical History:   Diagnosis Date    Depression        PAST SURGICAL HISTORY:   History reviewed. No pertinent surgical history.    ALLERGIES AND MEDICATION:     Review of patient's allergies indicates:   Allergen Reactions    Fish oil         Medication List          Accurate as of July 1, 2020  9:26 AM. If you have any questions, ask your nurse or doctor.            CONTINUE taking these medications    mupirocin 2 % ointment  Commonly known as: BACTROBAN  Apply topically once daily.            SOCIAL HISTORY:     Social History     Socioeconomic History    Marital status: Single     Spouse name: Not on file    Number of children: Not on file    Years of  education: Not on file    Highest education level: Not on file   Occupational History    Not on file   Social Needs    Financial resource strain: Not on file    Food insecurity     Worry: Not on file     Inability: Not on file    Transportation needs     Medical: Not on file     Non-medical: Not on file   Tobacco Use    Smoking status: Current Every Day Smoker     Packs/day: 0.50     Types: Cigarettes    Smokeless tobacco: Never Used   Substance and Sexual Activity    Alcohol use: Yes    Drug use: No    Sexual activity: Not on file   Lifestyle    Physical activity     Days per week: Not on file     Minutes per session: Not on file    Stress: Not on file   Relationships    Social connections     Talks on phone: Not on file     Gets together: Not on file     Attends [employer] service: Not on file     Active member of club or organization: Not on file     Attends meetings of clubs or organizations: Not on file     Relationship status: Not on file   Other Topics Concern    Not on file   Social History Narrative    Not on file       FAMILY HISTORY:     Family History   Problem Relation Age of Onset    No Known Problems Mother     No Known Problems Father        REVIEW OF SYSTEMS:   Review of Systems   Constitutional: Negative for chills, diaphoresis and fever.   HENT: Negative for nosebleeds.    Eyes: Negative for blurred vision, double vision and photophobia.   Respiratory: Negative for hemoptysis, shortness of breath and wheezing.    Cardiovascular: Positive for chest pain, palpitations and leg swelling (RLE intermittent). Negative for orthopnea, claudication and PND.   Gastrointestinal: Negative for abdominal pain, blood in stool, heartburn, melena, nausea and vomiting.   Genitourinary: Negative for flank pain and hematuria.   Musculoskeletal: Negative for falls, myalgias and neck pain.   Skin: Negative for rash.   Neurological: Negative for dizziness, seizures, loss of consciousness, weakness and  "headaches.   Endo/Heme/Allergies: Negative for polydipsia. Does not bruise/bleed easily.   Psychiatric/Behavioral: Negative for depression and memory loss. The patient is not nervous/anxious.        PHYSICAL EXAM:     Vitals:    07/01/20 0911   BP: 122/70   Pulse: 99   Resp: 15    Body mass index is 36.05 kg/m².  Weight: 92.3 kg (203 lb 7.8 oz)   Height: 5' 3" (160 cm)     Physical Exam  Vitals signs reviewed.   Constitutional:       General: She is not in acute distress.     Appearance: She is well-developed. She is obese. She is not ill-appearing, toxic-appearing or diaphoretic.   HENT:      Head: Normocephalic and atraumatic.   Eyes:      General: No scleral icterus.     Conjunctiva/sclera: Conjunctivae normal.      Pupils: Pupils are equal, round, and reactive to light.   Neck:      Musculoskeletal: Neck supple. No edema or neck rigidity.      Thyroid: No thyromegaly.      Vascular: Normal carotid pulses. No carotid bruit or JVD.      Trachea: Trachea normal.   Cardiovascular:      Rate and Rhythm: Normal rate and regular rhythm.      Chest Wall: PMI is not displaced.      Pulses:           Carotid pulses are 2+ on the right side and 2+ on the left side.     Heart sounds: Normal heart sounds, S1 normal and S2 normal. No murmur. No friction rub. No gallop.    Pulmonary:      Effort: Pulmonary effort is normal. No respiratory distress.      Breath sounds: Normal breath sounds. No stridor. No wheezing, rhonchi or rales.   Chest:      Chest wall: No tenderness.   Abdominal:      General: There is no distension.      Palpations: Abdomen is soft. There is no mass.   Musculoskeletal: Normal range of motion.         General: No swelling or tenderness.   Feet:      Right foot:      Skin integrity: No ulcer.      Left foot:      Skin integrity: No ulcer.   Skin:     General: Skin is warm and dry.      Findings: No erythema or rash.   Neurological:      Mental Status: She is alert and oriented to person, place, and time. "      Cranial Nerves: No cranial nerve deficit.   Psychiatric:         Speech: Speech normal.         Behavior: Behavior normal. Behavior is cooperative.         DATA:   EKG: (personally reviewed tracing)  6/29/20 SR 74    Laboratory:  CBC:        CHEMISTRIES:        CARDIAC BIOMARKERS:        COAGS:        LIPIDS/LFTS:        No results found for: TSH    No results found for: DDIMER      Cardiovascular Testing:  Ordered  Ex stress echo  Holter   LE venous US    ASSESSMENT:   # CP without both typ and atyp features  # palps  # intermitt RLE edema  # tob abuse  # BMI 36    PLAN:   Ex stress echo  Holter   LE venous US  Labs: CBC, CMP, Lipids, TSH, d-dimer  Ambulatory smoking cessation program  RTC 2 weeks, virtual visit    Aldair Brandt MD, FACC

## 2020-07-01 NOTE — LETTER
July 1, 2020      Eliana Valdes, NP  [address] [facility]  [place] [address]           [facility] [place] - Cardiology  [address]  [place] [address]  Phone: [phone]          Patient: Geni Brownlee   MR Number: [id number]   YOB: 1990   Date of Visit: 7/1/2020       Dear Eliana Valdes:    Thank you for referring Geni Brownlee to me for evaluation. Attached you will find relevant portions of my assessment and plan of care.    If you have questions, please do not hesitate to call me. I look forward to following Geni Brownlee along with you.    Sincerely,    Aldair Brandt MD    Enclosure  CC:  No Recipients    If you would like to receive this communication electronically, please contact [email or web address] or [phone] to request more information on [facility] access.    For providers and/or their staff who would like to refer a patient to [facility], please contact us through our one-stop-shop provider referral line, Brian Reynolds, at [phone].    If you feel you have received this communication in error or would no longer like to receive these types of communications, please e-mail [email or web address]

## 2020-07-06 ENCOUNTER — CLINICAL SUPPORT (OUTPATIENT)
Dept: SMOKING CESSATION | Facility: CLINIC | Age: 30
End: 2020-07-06

## 2020-07-06 DIAGNOSIS — F17.210 MODERATE CIGARETTE SMOKER (10-19 PER DAY): Primary | ICD-10-CM

## 2020-07-06 PROCEDURE — 99404 PREV MED CNSL INDIV APPRX 60: CPT | Mod: S$GLB,,,

## 2020-07-06 PROCEDURE — 99404 PR PREVENT COUNSEL,INDIV,60 MIN: ICD-10-PCS | Mod: S$GLB,,,

## 2020-07-06 PROCEDURE — 99999 PR PBB SHADOW E&M-EST. PATIENT-LVL II: ICD-10-PCS | Mod: PBBFAC,,,

## 2020-07-06 PROCEDURE — 99999 PR PBB SHADOW E&M-EST. PATIENT-LVL II: CPT | Mod: PBBFAC,,,

## 2020-07-06 RX ORDER — VARENICLINE TARTRATE 0.5 (11)-1
KIT ORAL
Qty: 53 TABLET | Refills: 0 | Status: SHIPPED | OUTPATIENT
Start: 2020-07-06 | End: 2020-08-11 | Stop reason: SDUPTHER

## 2020-07-06 NOTE — Clinical Note
Patient was seen for tobacco cessation intake.  Patient will begin on starter pack Chantix.  Patient has agreed to weekly follow up.

## 2020-07-09 ENCOUNTER — HOSPITAL ENCOUNTER (OUTPATIENT)
Dept: CARDIOLOGY | Facility: HOSPITAL | Age: 30
Discharge: HOME | End: 2020-07-09
Attending: INTERNAL MEDICINE
Payer: COMMERCIAL

## 2020-07-09 DIAGNOSIS — R00.2 HEART PALPITATIONS: ICD-10-CM

## 2020-07-09 DIAGNOSIS — R60.0 LEG EDEMA, RIGHT: ICD-10-CM

## 2020-07-09 PROCEDURE — 93226 XTRNL ECG REC<48 HR SCAN A/R: CPT

## 2020-07-09 PROCEDURE — 93970 EXTREMITY STUDY: CPT | Mod: 50

## 2020-07-09 PROCEDURE — 93970 EXTREMITY STUDY: CPT | Mod: 26,,, | Performed by: INTERNAL MEDICINE

## 2020-07-09 PROCEDURE — 93970 CV US DOPPLER VENOUS LEGS BILATERAL (CUPID ONLY): ICD-10-PCS | Mod: 26,,, | Performed by: INTERNAL MEDICINE

## 2020-07-09 PROCEDURE — 93227 XTRNL ECG REC<48 HR R&I: CPT | Mod: ,,, | Performed by: INTERNAL MEDICINE

## 2020-07-09 PROCEDURE — 93227 HOLTER MONITOR - 24 HOUR (CUPID ONLY): ICD-10-PCS | Mod: ,,, | Performed by: INTERNAL MEDICINE

## 2020-07-13 ENCOUNTER — CLINICAL SUPPORT (OUTPATIENT)
Dept: SMOKING CESSATION | Facility: CLINIC | Age: 30
End: 2020-07-13

## 2020-07-13 DIAGNOSIS — F17.210 LIGHT CIGARETTE SMOKER (1-9 CIGS/DAY): Primary | ICD-10-CM

## 2020-07-13 LAB
OHS CV EVENT MONITOR DAY: 1
OHS CV HOLTER LENGTH DECIMAL HOURS: 48
OHS CV HOLTER LENGTH HOURS: 24
OHS CV HOLTER LENGTH MINUTES: 0

## 2020-07-13 PROCEDURE — 99999 PR PBB SHADOW E&M-EST. PATIENT-LVL II: CPT | Mod: PBBFAC,,,

## 2020-07-13 PROCEDURE — 99404 PR PREVENT COUNSEL,INDIV,60 MIN: ICD-10-PCS | Mod: S$GLB,,,

## 2020-07-13 PROCEDURE — 99999 PR PBB SHADOW E&M-EST. PATIENT-LVL II: ICD-10-PCS | Mod: PBBFAC,,,

## 2020-07-13 PROCEDURE — 99404 PREV MED CNSL INDIV APPRX 60: CPT | Mod: S$GLB,,,

## 2020-07-13 NOTE — Clinical Note
Patient is smoking between 1ppd to 10 cpd. We discussed cues, triggers, reasons and benefits of quitting. We discussed willpower and importance in setting a quit date. Patient continue to use 1 mg Chantix without any negative side effects at this time.  We discussed tobacco cessation strategies and behavior modifications.  The patient denies any abnormal behavioral or mental changes at this time. The patient will continue with group therapy sessions and medication monitoring by CTTS. Prescribed medication management will be by physician.

## 2020-07-13 NOTE — PROGRESS NOTES
Individual Follow-Up Form    7/13/2020    Quit Date:     Clinical Status of Patient: Outpatient    Length of Service: 60 minutes    Continuing Medication: yes  Chantix    Other Medications: none     Target Symptoms: Withdrawal and medication side effects. The following were  rated moderate (3) to severe (4) on TCRS:  · Moderate (3): none  · Severe (4): none    Comments: Patient is smoking between 1ppd to 10 cpd. We discussed cues, triggers, reasons and benefits of quitting. We discussed willpower and importance in setting a quit date. Patient continue to use 1 mg Chantix without any negative side effects at this time.  We discussed tobacco cessation strategies and behavior modifications.  The patient denies any abnormal behavioral or mental changes at this time. The patient will continue with group therapy sessions and medication monitoring by CTTS. Prescribed medication management will be by physician. .      Diagnosis: F17.210    Next Visit: 2 weeks

## 2020-07-16 ENCOUNTER — OFFICE VISIT (OUTPATIENT)
Dept: CARDIOLOGY | Facility: CLINIC | Age: 30
End: 2020-07-16
Payer: COMMERCIAL

## 2020-07-16 DIAGNOSIS — R07.9 CHEST PAIN, UNSPECIFIED TYPE: Primary | ICD-10-CM

## 2020-07-16 DIAGNOSIS — R00.2 HEART PALPITATIONS: ICD-10-CM

## 2020-07-16 DIAGNOSIS — E66.9 NON MORBID OBESITY, UNSPECIFIED OBESITY TYPE: ICD-10-CM

## 2020-07-16 DIAGNOSIS — R60.0 BILATERAL LOWER EXTREMITY EDEMA: ICD-10-CM

## 2020-07-16 PROCEDURE — 99213 OFFICE O/P EST LOW 20 MIN: CPT | Mod: 95,,, | Performed by: INTERNAL MEDICINE

## 2020-07-16 PROCEDURE — 99213 PR OFFICE/OUTPT VISIT, EST, LEVL III, 20-29 MIN: ICD-10-PCS | Mod: 95,,, | Performed by: INTERNAL MEDICINE

## 2020-07-16 NOTE — PROGRESS NOTES
CARDIOVASCULAR PROGRESS NOTE    The patient location is: [place]  The chief complaint leading to consultation is: CP/palps/LE edema    Visit type: audiovisual    Face to Face time with patient: 10min  15 minutes of total time spent on the encounter, which includes face to face time and non-face to face time preparing to see the patient (eg, review of tests), Obtaining and/or reviewing separately obtained history, Documenting clinical information in the electronic or other health record, Independently interpreting results (not separately reported) and communicating results to the patient/family/caregiver, or Care coordination (not separately reported).         Each patient to whom he or she provides medical services by telemedicine is:  (1) informed of the relationship between the physician and patient and the respective role of any other health care provider with respect to management of the patient; and (2) notified that he or she may decline to receive medical services by telemedicine and may withdraw from such care at any time.    Notes:       REASON FOR CONSULT:   Geni Brownlee is a 29 y.o. female who presents for f/u CP/palps.    Req: LINDA Valdes  HISTORY OF PRESENT ILLNESS:   The patient returns for follow-up via virtual visit.  She continues to describe episodic chest pain and palpitations as well as lower extremity edema.  She unfortunately did not yet get her stress echo as it was initially denied by her insurance company but subsequently improved after I spoke with the .  Unfortunately, this had not yet been rescheduled.  She underwent lower extremity venous ultrasound which was negative for DVT as well as a Holter which was negative for arrhythmia.  She did report symptoms during her Holter on my interview today.  However, she states that she usually gets the symptoms more frequently on days where she is working, and she had the day off on the day of her Holter.  She otherwise denies syncope,  PND, orthopnea, melena, hematuria, or claudicant symptoms.  She is enrolled in the Ambulatory smoking cessation program and a started taking Chantix and plans to quit within the next month.  I have strongly encouraged her to make efforts towards smoking cessation.    I reviewed the results of her laboratories, Holter, and venous ultrasound with the patient.    CARDIOVASCULAR HISTORY:   none    PAST MEDICAL HISTORY:     Past Medical History:   Diagnosis Date    Depression        PAST SURGICAL HISTORY:   History reviewed. No pertinent surgical history.    ALLERGIES AND MEDICATION:     Review of patient's allergies indicates:   Allergen Reactions    Fish oil         Medication List          Accurate as of July 16, 2020  2:22 PM. If you have any questions, ask your nurse or doctor.            CONTINUE taking these medications    CHANTIX STARTING MONTH BOX 0.5 mg (11)- 1 mg (42) tablet  Generic drug: varenicline  Take one 0.5mg tab by mouth once daily X3 days,then increase to one 0.5mg tab twice daily X4 days,then increase to one 1mg tab twice daily.CX     mupirocin 2 % ointment  Commonly known as: BACTROBAN  Apply topically once daily.            SOCIAL HISTORY:     Social History     Socioeconomic History    Marital status: Single     Spouse name: Not on file    Number of children: Not on file    Years of education: Not on file    Highest education level: Not on file   Occupational History    Not on file   Social Needs    Financial resource strain: Not on file    Food insecurity     Worry: Not on file     Inability: Not on file    Transportation needs     Medical: Not on file     Non-medical: Not on file   Tobacco Use    Smoking status: Current Every Day Smoker     Packs/day: 0.50     Types: Cigarettes    Smokeless tobacco: Never Used   Substance and Sexual Activity    Alcohol use: Yes    Drug use: No    Sexual activity: Not on file   Lifestyle    Physical activity     Days per week: Not on file      Minutes per session: Not on file    Stress: Not on file   Relationships    Social connections     Talks on phone: Not on file     Gets together: Not on file     Attends [employer] service: Not on file     Active member of club or organization: Not on file     Attends meetings of clubs or organizations: Not on file     Relationship status: Not on file   Other Topics Concern    Not on file   Social History Narrative    Not on file       FAMILY HISTORY:     Family History   Problem Relation Age of Onset    No Known Problems Mother     No Known Problems Father        REVIEW OF SYSTEMS:   Review of Systems   Constitutional: Negative for chills, diaphoresis and fever.   HENT: Negative for nosebleeds.    Eyes: Negative for blurred vision, double vision and photophobia.   Respiratory: Negative for hemoptysis, shortness of breath and wheezing.    Cardiovascular: Positive for chest pain, palpitations and leg swelling (RLE intermittent). Negative for orthopnea, claudication and PND.   Gastrointestinal: Negative for abdominal pain, blood in stool, heartburn, melena, nausea and vomiting.   Genitourinary: Negative for flank pain and hematuria.   Musculoskeletal: Negative for falls, myalgias and neck pain.   Skin: Negative for rash.   Neurological: Negative for dizziness, seizures, loss of consciousness, weakness and headaches.   Endo/Heme/Allergies: Negative for polydipsia. Does not bruise/bleed easily.   Psychiatric/Behavioral: Negative for depression and memory loss. The patient is not nervous/anxious.        PHYSICAL EXAM:     There were no vitals filed for this visit. There is no height or weight on file to calculate BMI.            General:  Well-appearing, well-nourished and in no apparent distress.  HEENT:  NC/AT, EOMI, anicteric sclera.  Neck:  Supple, trachea midline.  CNS:  Alert and oriented x3, cranial nerves 2-12 grossly intact.  Psych:  Normal mood and affect.    Per OV 7/1/20:  Physical Exam  Vitals signs  reviewed.   Constitutional:       General: She is not in acute distress.     Appearance: She is well-developed. She is obese. She is not ill-appearing, toxic-appearing or diaphoretic.   HENT:      Head: Normocephalic and atraumatic.   Eyes:      General: No scleral icterus.     Conjunctiva/sclera: Conjunctivae normal.      Pupils: Pupils are equal, round, and reactive to light.   Neck:      Musculoskeletal: Neck supple. No edema or neck rigidity.      Thyroid: No thyromegaly.      Vascular: Normal carotid pulses. No carotid bruit or JVD.      Trachea: Trachea normal.   Cardiovascular:      Rate and Rhythm: Normal rate and regular rhythm.      Chest Wall: PMI is not displaced.      Pulses:           Carotid pulses are 2+ on the right side and 2+ on the left side.     Heart sounds: Normal heart sounds, S1 normal and S2 normal. No murmur. No friction rub. No gallop.    Pulmonary:      Effort: Pulmonary effort is normal. No respiratory distress.      Breath sounds: Normal breath sounds. No stridor. No wheezing, rhonchi or rales.   Chest:      Chest wall: No tenderness.   Abdominal:      General: There is no distension.      Palpations: Abdomen is soft. There is no mass.   Musculoskeletal: Normal range of motion.         General: No swelling or tenderness.   Feet:      Right foot:      Skin integrity: No ulcer.      Left foot:      Skin integrity: No ulcer.   Skin:     General: Skin is warm and dry.      Findings: No erythema or rash.   Neurological:      Mental Status: She is alert and oriented to person, place, and time.      Cranial Nerves: No cranial nerve deficit.   Psychiatric:         Speech: Speech normal.         Behavior: Behavior normal. Behavior is cooperative.         DATA:   EKG: (personally reviewed tracing)  6/29/20 SR 74    Laboratory:  CBC:  Recent Labs   Lab 07/09/20  1041   WBC 6.52   Hemoglobin 13.1   Hematocrit 38.7   Platelets 206       CHEMISTRIES:  Recent Labs   Lab 07/09/20  1041   Glucose 99    Sodium 138   Potassium 4.2   BUN, Bld 11   Creatinine 0.8   eGFR if  >60   eGFR if non African American >60   Calcium 8.5 L       CARDIAC BIOMARKERS:        COAGS:        LIPIDS/LFTS:  Recent Labs   Lab 07/09/20  1041   Cholesterol 147   Triglycerides 125   HDL 38 L   LDL Cholesterol 84.0   Non-HDL Cholesterol 109   AST 25   ALT 44       Lab Results   Component Value Date    TSH 1.405 07/09/2020       Lab Results   Component Value Date    DDIMER 0.22 07/09/2020         Cardiovascular Testing:  Ex stress echo-pending    LE venous US/reflux-ordered    EVM-ordered    Holter 7/9/20  · Predominantly normal sinus rhythm. Heart rates varied between 54 and 150 bpm with an average of 88 bpm.  · There were occasional PVCs totalling 633 and averaging 13.19 per hour.  · There were very rare PACs totalling 48 and averaging 1 per hour.    LE venous US 7/9/20  No evidence of lower extremity DVT bilaterally.    ASSESSMENT:   # CP without both typ and atyp features, persistent, stress echo not done yet (was initially denied by insurance but subsequently authorized)  # palps, persistent.  Holter 7/2020 neg (with sxs)  # intermitt RLE edema, LE venous US 7/2020 neg for DVT  # tob abuse, started chantix, plans to quit in next month  # BMI 36    PLAN:   Ex stress echo  EVM  LE venous US/reflux  Already enrolled in ambulatory smoking cessation program  RTC 6 weeks, virtual visit    Aldair Brandt MD, [facility]    Addendum 7/23/20:    Ex stress echo 7/23/20  · The stress echo portion of this study is negative for myocardial ischemia.  · The ECG portion of this study is negative for myocardial ischemia. (Nicolás 7:05, 9 METS, 101% MPHR, +CP)  · Normal left ventricular systolic function. The estimated ejection fraction is 55%.  · Concentric left ventricular hypertrophy.  · Normal LV diastolic function.  · Normal right ventricular systolic function.  · No pulmonary hypertension present.  · The patient's exercise capacity was  normal.  · The patient reported chest pain during the stress test.  · There were no arrhythmias during stress.  · The test was stopped because the patient experienced chest pain. The patient reached the end of the protocol.    LE venous US/reflux 7/23/20  · No evidence of right lower extremity DVT.  · No evidence of left lower extremity DVT.  · Right greater saphenous vein is abnormal with reflux throughout thigh and proximal below knee segments.  · No evidence of LLE venous reflux.    Pt to be called and informed of results as well as recommendation to obtain compression stockings.  Follow up as planned after EVM complete.

## 2020-07-23 ENCOUNTER — HOSPITAL ENCOUNTER (OUTPATIENT)
Dept: CARDIOLOGY | Facility: HOSPITAL | Age: 30
Discharge: HOME | End: 2020-07-23
Attending: INTERNAL MEDICINE
Payer: COMMERCIAL

## 2020-07-23 ENCOUNTER — CLINICAL SUPPORT (OUTPATIENT)
Dept: CARDIOLOGY | Facility: HOSPITAL | Age: 30
End: 2020-07-23
Attending: INTERNAL MEDICINE
Payer: COMMERCIAL

## 2020-07-23 DIAGNOSIS — R00.2 HEART PALPITATIONS: ICD-10-CM

## 2020-07-23 DIAGNOSIS — R07.9 CHEST PAIN, UNSPECIFIED TYPE: ICD-10-CM

## 2020-07-23 DIAGNOSIS — R60.0 BILATERAL LOWER EXTREMITY EDEMA: ICD-10-CM

## 2020-07-23 LAB
AORTIC ROOT ANNULUS: 2.83 CM
AORTIC VALVE CUSP SEPERATION: 1.98 CM
ASCENDING AORTA: 2.51 CM
AV INDEX (PROSTH): 0.81
AV MEAN GRADIENT: 4 MMHG
AV PEAK GRADIENT: 6 MMHG
AV VALVE AREA: 2.61 CM2
AV VELOCITY RATIO: 0.8
CV ECHO LV RWT: 0.63 CM
CV STRESS BASE HR: 74 BPM
DIASTOLIC BLOOD PRESSURE: 89 MMHG
DOP CALC AO PEAK VEL: 1.27 M/S
DOP CALC AO VTI: 25.83 CM
DOP CALC LVOT AREA: 3.2 CM2
DOP CALC LVOT DIAMETER: 2.03 CM
DOP CALC LVOT PEAK VEL: 1.01 M/S
DOP CALC LVOT STROKE VOLUME: 67.45 CM3
DOP CALCLVOT PEAK VEL VTI: 20.85 CM
E WAVE DECELERATION TIME: 238.15 MSEC
E/A RATIO: 2.06
E/E' RATIO: 6.77 M/S
ECHO LV POSTERIOR WALL: 1.16 CM (ref 0.6–1.1)
FRACTIONAL SHORTENING: 29 % (ref 28–44)
INTERVENTRICULAR SEPTUM: 1.2 CM (ref 0.6–1.1)
IVRT: 85.35 MSEC
LA MAJOR: 4.82 CM
LA MINOR: 4.4 CM
LA WIDTH: 2.65 CM
LEFT ATRIUM SIZE: 3.42 CM
LEFT ATRIUM VOLUME: 35.44 CM3
LEFT GREAT SAPHENOUS DISTAL THIGH DIA: 0.3 CM
LEFT GREAT SAPHENOUS JUNCTION DIA: 0.9 CM
LEFT GREAT SAPHENOUS KNEE DIA: 0.4 CM
LEFT GREAT SAPHENOUS MIDDLE THIGH DIA: 0.4 CM
LEFT GREAT SAPHENOUS PROXIMAL CALF DIA: 0.3 CM
LEFT INTERNAL DIMENSION IN SYSTOLE: 2.63 CM (ref 2.1–4)
LEFT SMALL SAPHENOUS KNEE DIA: 0.4 CM
LEFT SMALL SAPHENOUS SPJ DIA: 0.3 CM
LEFT VENTRICLE DIASTOLIC VOLUME: 58.66 ML
LEFT VENTRICLE SYSTOLIC VOLUME: 25.34 ML
LEFT VENTRICULAR INTERNAL DIMENSION IN DIASTOLE: 3.71 CM (ref 3.5–6)
LEFT VENTRICULAR MASS: 144.19 G
LV LATERAL E/E' RATIO: 6.56 M/S
LV SEPTAL E/E' RATIO: 7 M/S
MV PEAK A VEL: 0.51 M/S
MV PEAK E VEL: 1.05 M/S
MV STENOSIS PRESSURE HALF TIME: 69.06 MS
MV VALVE AREA P 1/2 METHOD: 3.19 CM2
OHS CV CPX 1 MINUTE RECOVERY HEART RATE: 136 BPM
OHS CV CPX 85 PERCENT MAX PREDICTED HEART RATE MALE: 153
OHS CV CPX ESTIMATED METS: 9
OHS CV CPX MAX PREDICTED HEART RATE: 180
OHS CV CPX PATIENT IS FEMALE: 1
OHS CV CPX PATIENT IS MALE: 0
OHS CV CPX PEAK DIASTOLIC BLOOD PRESSURE: 70 MMHG
OHS CV CPX PEAK HEAR RATE: 181 BPM
OHS CV CPX PEAK RATE PRESSURE PRODUCT: ABNORMAL
OHS CV CPX PEAK SYSTOLIC BLOOD PRESSURE: 178 MMHG
OHS CV CPX PERCENT MAX PREDICTED HEART RATE ACHIEVED: 101
OHS CV CPX RATE PRESSURE PRODUCT PRESENTING: 9324
PISA TR MAX VEL: 2.07 M/S
PULM VEIN S/D RATIO: 0.78
PV PEAK D VEL: 0.67 M/S
PV PEAK S VEL: 0.52 M/S
PV PEAK VELOCITY: 0.98 CM/S
RA MAJOR: 4.55 CM
RA WIDTH: 2.78 CM
RIGHT GREAT SAPHENOUS DISTAL THIGH DIA: 0.4 CM
RIGHT GREAT SAPHENOUS DISTAL THIGH REFLUX: 882 MS
RIGHT GREAT SAPHENOUS JUNCTION DIA: 0.7 CM
RIGHT GREAT SAPHENOUS KNEE DIA: 0.4 CM
RIGHT GREAT SAPHENOUS KNEE REFLUX: 1179 MS
RIGHT GREAT SAPHENOUS MIDDLE THIGH DIA: 0.4 CM
RIGHT GREAT SAPHENOUS MIDDLE THIGH REFLUX: 1050 MS
RIGHT GREAT SAPHENOUS PROXIMAL CALF DIA: 0.3 CM
RIGHT GREAT SAPHENOUS PROXIMAL CALF REFLUX: 689 MS
RIGHT SMALL SAPHENOUS KNEE DIA: 0.3 CM
RIGHT SMALL SAPHENOUS SPJ DIA: 0.2 CM
RIGHT VENTRICULAR END-DIASTOLIC DIMENSION: 3.42 CM
RV TISSUE DOPPLER FREE WALL SYSTOLIC VELOCITY 1 (APICAL 4 CHAMBER VIEW): 11.27 CM/S
SINUS: 2.91 CM
STJ: 2.11 CM
STRESS ANGINA INDEX: 2
STRESS ECHO POST EXERCISE DUR MIN: 7 MINUTES
STRESS ECHO POST EXERCISE DUR SEC: 5 SECONDS
SYSTOLIC BLOOD PRESSURE: 126 MMHG
TDI LATERAL: 0.16 M/S
TDI SEPTAL: 0.15 M/S
TDI: 0.16 M/S
TR MAX PG: 17 MMHG
TRICUSPID ANNULAR PLANE SYSTOLIC EXCURSION: 1.95 CM

## 2020-07-23 PROCEDURE — 93325 DOPPLER ECHO COLOR FLOW MAPG: CPT

## 2020-07-23 PROCEDURE — 93970 EXTREMITY STUDY: CPT | Mod: 50,TC

## 2020-07-23 PROCEDURE — 93271 ECG/MONITORING AND ANALYSIS: CPT

## 2020-07-23 PROCEDURE — 93351 STRESS ECHO (CUPID ONLY): ICD-10-PCS | Mod: 26,,, | Performed by: INTERNAL MEDICINE

## 2020-07-23 PROCEDURE — 93320 DOPPLER ECHO COMPLETE: CPT | Mod: 26,,, | Performed by: INTERNAL MEDICINE

## 2020-07-23 PROCEDURE — 93325 STRESS ECHO (CUPID ONLY): ICD-10-PCS | Mod: 26,,, | Performed by: INTERNAL MEDICINE

## 2020-07-23 PROCEDURE — 93272 ECG/REVIEW INTERPRET ONLY: CPT | Mod: ,,, | Performed by: INTERNAL MEDICINE

## 2020-07-23 PROCEDURE — 93970 EXTREMITY STUDY: CPT | Mod: 26,,, | Performed by: INTERNAL MEDICINE

## 2020-07-23 PROCEDURE — 93970 CV US LOWER VENOUS INSUFFICIENCY BILATERAL (CUPID ONLY): ICD-10-PCS | Mod: 26,,, | Performed by: INTERNAL MEDICINE

## 2020-07-23 PROCEDURE — 93320 STRESS ECHO (CUPID ONLY): ICD-10-PCS | Mod: 26,,, | Performed by: INTERNAL MEDICINE

## 2020-07-23 PROCEDURE — 93351 STRESS TTE COMPLETE: CPT | Mod: 26,,, | Performed by: INTERNAL MEDICINE

## 2020-07-23 PROCEDURE — 93325 DOPPLER ECHO COLOR FLOW MAPG: CPT | Mod: 26,,, | Performed by: INTERNAL MEDICINE

## 2020-07-23 PROCEDURE — 93272 CARDIAC EVENT MONITOR (CUPID ONLY): ICD-10-PCS | Mod: ,,, | Performed by: INTERNAL MEDICINE

## 2020-07-28 ENCOUNTER — CLINICAL SUPPORT (OUTPATIENT)
Dept: SMOKING CESSATION | Facility: CLINIC | Age: 30
End: 2020-07-28

## 2020-07-28 DIAGNOSIS — F17.210 LIGHT CIGARETTE SMOKER (1-9 CIGS/DAY): Primary | ICD-10-CM

## 2020-07-28 PROCEDURE — 99403 PR PREVENT COUNSEL,INDIV,45 MIN: ICD-10-PCS | Mod: S$GLB,,,

## 2020-07-28 PROCEDURE — 99999 PR PBB SHADOW E&M-EST. PATIENT-LVL I: ICD-10-PCS | Mod: PBBFAC,,,

## 2020-07-28 PROCEDURE — 99999 PR PBB SHADOW E&M-EST. PATIENT-LVL I: CPT | Mod: PBBFAC,,,

## 2020-07-28 PROCEDURE — 99403 PREV MED CNSL INDIV APPRX 45: CPT | Mod: S$GLB,,,

## 2020-07-28 NOTE — PROGRESS NOTES
Individual Follow-Up Form    7/28/2020    Quit Date:     Clinical Status of Patient: Outpatient    Length of Service: 60 minutes    Continuing Medication: yes  Chantix    Other Medications:      Target Symptoms: Withdrawal and medication side effects. The following were  rated moderate (3) to severe (4) on TCRS:  · Moderate (3): none  · Severe (4): none    Comments: Patient is smoking 2-3 cpd.  Patient denies nausea at this time since eating and drinking before taking Chantix.  Patient continue use 1 mg Chantix BID without any negative side effects at this time.  Patient continue to smoke while walking her dog and was encouraged to change behavior.  Patient continue to bring things out with her to distract from smoking while walking her dog.  Patient set a quit date in 2 weeks.  completion of TCRS (Tobacco Cessation Rating Scale) reviewed strategies, cues, and triggers. Introduced the negative impact of tobacco on health, the health advantages of discontinuing the use of tobacco, time line improved health changes after a quit, withdrawal issues to expect from nicotine and habit, and ways to achieve the goal of a quit. The patient denies any abnormal behavioral or mental changes at this time. The patient will continue with therapy sessions and medication monitoring by CTTS. Prescribed medication management will be by physician.         Diagnosis: F17.210    Next Visit: 2 weeks

## 2020-08-11 ENCOUNTER — CLINICAL SUPPORT (OUTPATIENT)
Dept: SMOKING CESSATION | Facility: CLINIC | Age: 30
End: 2020-08-11

## 2020-08-11 DIAGNOSIS — F17.210 LIGHT CIGARETTE SMOKER (1-9 CIGS/DAY): Primary | ICD-10-CM

## 2020-08-11 PROCEDURE — 99404 PR PREVENT COUNSEL,INDIV,60 MIN: ICD-10-PCS | Mod: S$GLB,,,

## 2020-08-11 PROCEDURE — 99999 PR PBB SHADOW E&M-EST. PATIENT-LVL II: CPT | Mod: PBBFAC,,,

## 2020-08-11 PROCEDURE — 99404 PREV MED CNSL INDIV APPRX 60: CPT | Mod: S$GLB,,,

## 2020-08-11 PROCEDURE — 99999 PR PBB SHADOW E&M-EST. PATIENT-LVL II: ICD-10-PCS | Mod: PBBFAC,,,

## 2020-08-11 RX ORDER — VARENICLINE TARTRATE 1 MG/1
1 TABLET, FILM COATED ORAL 2 TIMES DAILY
Qty: 56 TABLET | Refills: 0 | Status: SHIPPED | OUTPATIENT
Start: 2020-08-11 | End: 2020-09-30 | Stop reason: SDUPTHER

## 2020-08-11 NOTE — PROGRESS NOTES
Individual Follow-Up Form    8/11/2020    Quit Date:    Clinical Status of Patient: Outpatient    Length of Service: 60 minutes    Continuing Medication: yes  Chantix    Other Medications: none     Target Symptoms: Withdrawal and medication side effects. The following were  rated moderate (3) to severe (4) on TCRS:  · Moderate (3): none  · Severe (4): none    Comments: Patient is tobacco free at this time. Patient continue to use 1 mg Chantix BID without any negative side effects at this time.completion of TCRS (Tobacco Cessation Rating Scale) reviewed strategies, controlling environment, cues, triggers, new goals set. Introduced high risk situations with preparation interventions, caffeine similarities with withdrawal issues of habit and nicotine, Alcohol, Understanding urges, cravings, stress and relaxation. Open discussion with intervention discussion. The patient denies any abnormal behavioral or mental changes at this time. The patient will continue with group therapy sessions and medication monitoring by CTTS. Prescribed medication management will be by physician.       Diagnosis: F17.210    Next Visit: 2 weeks

## 2020-08-27 ENCOUNTER — TELEPHONE (OUTPATIENT)
Dept: SMOKING CESSATION | Facility: CLINIC | Age: 30
End: 2020-08-27

## 2020-08-31 ENCOUNTER — CLINICAL SUPPORT (OUTPATIENT)
Dept: SMOKING CESSATION | Facility: CLINIC | Age: 30
End: 2020-08-31

## 2020-08-31 DIAGNOSIS — F17.210 CIGARETTE NICOTINE DEPENDENCE, UNCOMPLICATED: Primary | ICD-10-CM

## 2020-08-31 PROCEDURE — 99402 PR PREVENT COUNSEL,INDIV,30 MIN: ICD-10-PCS | Mod: S$GLB,,,

## 2020-08-31 PROCEDURE — 99402 PREV MED CNSL INDIV APPRX 30: CPT | Mod: S$GLB,,,

## 2020-09-16 ENCOUNTER — CLINICAL SUPPORT (OUTPATIENT)
Dept: SMOKING CESSATION | Facility: CLINIC | Age: 30
End: 2020-09-16

## 2020-09-16 ENCOUNTER — TELEPHONE (OUTPATIENT)
Dept: SMOKING CESSATION | Facility: CLINIC | Age: 30
End: 2020-09-16

## 2020-09-16 DIAGNOSIS — F17.210 CIGARETTE NICOTINE DEPENDENCE, UNCOMPLICATED: Primary | ICD-10-CM

## 2020-09-16 PROCEDURE — 99401 PREV MED CNSL INDIV APPRX 15: CPT | Mod: S$GLB,,,

## 2020-09-16 PROCEDURE — 99999 PR PBB SHADOW E&M-EST. PATIENT-LVL I: CPT | Mod: PBBFAC,,,

## 2020-09-16 PROCEDURE — 99401 PR PREVENT COUNSEL,INDIV,15 MIN: ICD-10-PCS | Mod: S$GLB,,,

## 2020-09-16 PROCEDURE — 99999 PR PBB SHADOW E&M-EST. PATIENT-LVL I: ICD-10-PCS | Mod: PBBFAC,,,

## 2020-09-16 NOTE — Clinical Note
Patient remains tobacco free at this time.  Patient continue to use 1 mg Chantix BID without any negtive side effects at this time. Patient reports no issues with urges of cravings to smoke.  We reviewed benefits observed since quitting and overcoming high risk situations.   The patient denies any abnormal behavioral or mental changes at this time. The patient will continue with group therapy sessions and medication monitoring by CTTS. Prescribed medication management will be by physician.

## 2020-09-21 NOTE — PROGRESS NOTES
Individual Follow-Up Form    9/21/2020    Quit Date:     Clinical Status of Patient: Outpatient    Length of Service: 30 minutes    Continuing Medication: yes  Chantix    Other Medications: none     Target Symptoms: Withdrawal and medication side effects. The following were  rated moderate (3) to severe (4) on TCRS:  · Moderate (3): none  · Severe (4): none    Comments: Patient remains tobacco free at this time. Patient continue to use 1mg Chantix BID without any negative side effects at this time.   Completion of TCRS (Tobacco Cessation Rating Scale) reviewed strategies, habitual behavior, stress, and high risk situations. Introduced stress with addition interventions, SOLVE, relaxation with interventions, nutrition, exercise, weight gain, and the importance of rewarding oneself for accomplishments toward becoming tobacco free. Open discussion of all items with interventions. The patient denies any abnormal behavioral or mental changes at this time.   The patient will continue with group therapy sessions and medication monitoring by CTTS. Prescribed medication management will be by physician.         Diagnosis: F17.210    Next Visit: 2 weeks

## 2020-09-24 NOTE — PROGRESS NOTES
Individual Follow-Up Form    9/16/20  Quit Date:     Clinical Status of Patient: Outpatient    Length of Service: 15 minutes    Continuing Medication: yes  Chantix    Other Medications: none     Target Symptoms: Withdrawal and medication side effects. The following were  rated moderate (3) to severe (4) on TCRS:  · Moderate (3): none  · Severe (4): none    Comments: Patient remains tobacco free at this time.  Patient continue to use 1 mg Chantix BID without any negtive side effects at this time. Patient reports no issues with urges of cravings to smoke.  We reviewed benefits observed since quitting and overcoming high risk situations.   The patient denies any abnormal behavioral or mental changes at this time. The patient will continue with group therapy sessions and medication monitoring by CTTS. Prescribed medication management will be by physician.       Diagnosis: F17.210    Next Visit: 2 weeks

## 2020-09-30 ENCOUNTER — CLINICAL SUPPORT (OUTPATIENT)
Dept: SMOKING CESSATION | Facility: CLINIC | Age: 30
End: 2020-09-30

## 2020-09-30 DIAGNOSIS — F17.210 LIGHT CIGARETTE SMOKER (1-9 CIGS/DAY): ICD-10-CM

## 2020-09-30 DIAGNOSIS — F17.210 CIGARETTE NICOTINE DEPENDENCE, UNCOMPLICATED: Primary | ICD-10-CM

## 2020-09-30 PROCEDURE — 99404 PREV MED CNSL INDIV APPRX 60: CPT | Mod: S$GLB,,,

## 2020-09-30 PROCEDURE — 99404 PR PREVENT COUNSEL,INDIV,60 MIN: ICD-10-PCS | Mod: S$GLB,,,

## 2020-09-30 PROCEDURE — 99999 PR PBB SHADOW E&M-EST. PATIENT-LVL I: CPT | Mod: PBBFAC,,,

## 2020-09-30 PROCEDURE — 99999 PR PBB SHADOW E&M-EST. PATIENT-LVL I: ICD-10-PCS | Mod: PBBFAC,,,

## 2020-09-30 RX ORDER — VARENICLINE TARTRATE 1 MG/1
1 TABLET, FILM COATED ORAL 2 TIMES DAILY
Qty: 56 TABLET | Refills: 0 | Status: SHIPPED | OUTPATIENT
Start: 2020-09-30 | End: 2021-08-24

## 2020-09-30 NOTE — Clinical Note
Patient remains tobacco free at this time. The patient remains on the prescribed tobacco cessation medication regimen of 1 mg Chantix BID without any negative side effects at this time. completion of TCRS (Tobacco Cessation Rating Scale) reviewed strategies, cues, triggers, high risk situations, lapses, relapses, diet, exercise, stress, relaxation, sleep, habitual behavior, and life style changes. The patient denies any abnormal behavioral or mental changes at this time. The patient will continue with group therapy sessions and medication monitoring by CTTS. Prescribed medication management will be by physician.

## 2020-12-30 ENCOUNTER — CLINICAL SUPPORT (OUTPATIENT)
Dept: URGENT CARE | Facility: CLINIC | Age: 30
End: 2020-12-30
Payer: COMMERCIAL

## 2020-12-30 VITALS — HEART RATE: 100 BPM | OXYGEN SATURATION: 98 % | TEMPERATURE: 98 F

## 2020-12-30 DIAGNOSIS — Z11.9 SCREENING EXAMINATION FOR UNSPECIFIED INFECTIOUS DISEASE: Primary | ICD-10-CM

## 2020-12-30 LAB
CTP QC/QA: YES
SARS-COV-2 RDRP RESP QL NAA+PROBE: NEGATIVE

## 2020-12-30 PROCEDURE — U0002 COVID-19 LAB TEST NON-CDC: HCPCS | Mod: QW,S$GLB,, | Performed by: EMERGENCY MEDICINE

## 2020-12-30 PROCEDURE — U0002: ICD-10-PCS | Mod: QW,S$GLB,, | Performed by: EMERGENCY MEDICINE

## 2020-12-30 PROCEDURE — 99211 OFF/OP EST MAY X REQ PHY/QHP: CPT | Mod: S$GLB,,, | Performed by: EMERGENCY MEDICINE

## 2020-12-30 PROCEDURE — 99211 PR OFFICE/OUTPT VISIT, EST, LEVL I: ICD-10-PCS | Mod: S$GLB,,, | Performed by: EMERGENCY MEDICINE

## 2020-12-30 NOTE — PROGRESS NOTES
Patient presents to the clinic with COVID concerns, patient was given the option to see a provider.  Patient is symptomatic and elects to not see a provider, they were given a handout which recognizes their decision to not see the provider today, as well as recommendations to follow up with their PCP.  If their symptoms worsen, they will need to follow up with their PCP, any urgent care clinic, or ER for further evaluation.     Patient's temperature, O2 sats, and pulse were taken for this visit.   Vitals:    12/30/20 1239   Pulse: 100   Temp: 97.9 °F (36.6 °C)         They were within normal limits.   If not with in normal, they were advised that they should see the provider for evaluation.  However, they adamantly refused despite provider's encouragement.

## 2021-03-10 ENCOUNTER — TELEPHONE (OUTPATIENT)
Dept: SMOKING CESSATION | Facility: CLINIC | Age: 31
End: 2021-03-10

## 2021-03-20 ENCOUNTER — IMMUNIZATION (OUTPATIENT)
Dept: PRIMARY CARE CLINIC | Facility: CLINIC | Age: 31
End: 2021-03-20
Payer: COMMERCIAL

## 2021-03-20 DIAGNOSIS — Z23 NEED FOR VACCINATION: Primary | ICD-10-CM

## 2021-03-20 PROCEDURE — 0011A COVID-19, MRNA, LNP-S, PF, 100 MCG/0.5 ML DOSE VACCINE: CPT | Mod: PBBFAC | Performed by: FAMILY MEDICINE

## 2021-04-17 ENCOUNTER — IMMUNIZATION (OUTPATIENT)
Dept: PRIMARY CARE CLINIC | Facility: CLINIC | Age: 31
End: 2021-04-17
Payer: COMMERCIAL

## 2021-04-17 DIAGNOSIS — Z23 NEED FOR VACCINATION: Primary | ICD-10-CM

## 2021-04-17 PROCEDURE — 0012A COVID-19, MRNA, LNP-S, PF, 100 MCG/0.5 ML DOSE VACCINE: CPT | Mod: PBBFAC | Performed by: FAMILY MEDICINE

## 2021-04-28 ENCOUNTER — TELEPHONE (OUTPATIENT)
Dept: SMOKING CESSATION | Facility: CLINIC | Age: 31
End: 2021-04-28

## 2021-06-21 ENCOUNTER — OFFICE VISIT (OUTPATIENT)
Dept: URGENT CARE | Facility: CLINIC | Age: 31
End: 2021-06-21
Payer: COMMERCIAL

## 2021-06-21 VITALS
TEMPERATURE: 98 F | HEART RATE: 80 BPM | SYSTOLIC BLOOD PRESSURE: 139 MMHG | DIASTOLIC BLOOD PRESSURE: 78 MMHG | OXYGEN SATURATION: 98 % | RESPIRATION RATE: 17 BRPM

## 2021-06-21 DIAGNOSIS — S63.634A SPRAIN OF INTERPHALANGEAL JOINT OF RIGHT RING FINGER, INITIAL ENCOUNTER: Primary | ICD-10-CM

## 2021-06-21 DIAGNOSIS — T14.90XA TRAUMA: ICD-10-CM

## 2021-06-21 PROCEDURE — 99214 OFFICE O/P EST MOD 30 MIN: CPT | Mod: S$GLB,,, | Performed by: FAMILY MEDICINE

## 2021-06-21 PROCEDURE — 99214 PR OFFICE/OUTPT VISIT, EST, LEVL IV, 30-39 MIN: ICD-10-PCS | Mod: S$GLB,,, | Performed by: FAMILY MEDICINE

## 2021-06-21 PROCEDURE — 73140 XR FINGER 2 OR MORE VIEWS: ICD-10-PCS | Mod: FY,F8,S$GLB, | Performed by: RADIOLOGY

## 2021-06-21 PROCEDURE — 73140 X-RAY EXAM OF FINGER(S): CPT | Mod: FY,F8,S$GLB, | Performed by: RADIOLOGY

## 2021-07-26 ENCOUNTER — CLINICAL SUPPORT (OUTPATIENT)
Dept: URGENT CARE | Facility: CLINIC | Age: 31
End: 2021-07-26
Payer: COMMERCIAL

## 2021-07-26 DIAGNOSIS — Z11.9 SCREENING EXAMINATION FOR UNSPECIFIED INFECTIOUS DISEASE: Primary | ICD-10-CM

## 2021-07-26 LAB
CTP QC/QA: YES
SARS-COV-2 RDRP RESP QL NAA+PROBE: NEGATIVE

## 2021-07-26 PROCEDURE — 99211 OFF/OP EST MAY X REQ PHY/QHP: CPT | Mod: S$GLB,,, | Performed by: FAMILY MEDICINE

## 2021-07-26 PROCEDURE — U0002 COVID-19 LAB TEST NON-CDC: HCPCS | Mod: QW,S$GLB,, | Performed by: FAMILY MEDICINE

## 2021-07-26 PROCEDURE — 99211 PR OFFICE/OUTPT VISIT, EST, LEVL I: ICD-10-PCS | Mod: S$GLB,,, | Performed by: FAMILY MEDICINE

## 2021-07-26 PROCEDURE — U0002: ICD-10-PCS | Mod: QW,S$GLB,, | Performed by: FAMILY MEDICINE

## 2021-08-24 ENCOUNTER — OFFICE VISIT (OUTPATIENT)
Dept: URGENT CARE | Facility: CLINIC | Age: 31
End: 2021-08-24
Payer: COMMERCIAL

## 2021-08-24 VITALS
HEIGHT: 63 IN | DIASTOLIC BLOOD PRESSURE: 94 MMHG | HEART RATE: 82 BPM | TEMPERATURE: 98 F | RESPIRATION RATE: 17 BRPM | OXYGEN SATURATION: 97 % | SYSTOLIC BLOOD PRESSURE: 142 MMHG | WEIGHT: 203 LBS | BODY MASS INDEX: 35.97 KG/M2

## 2021-08-24 DIAGNOSIS — B34.9 VIRAL SYNDROME: Primary | ICD-10-CM

## 2021-08-24 DIAGNOSIS — R53.83 FATIGUE, UNSPECIFIED TYPE: ICD-10-CM

## 2021-08-24 DIAGNOSIS — R11.2 NON-INTRACTABLE VOMITING WITH NAUSEA, UNSPECIFIED VOMITING TYPE: ICD-10-CM

## 2021-08-24 LAB
CTP QC/QA: YES
SARS-COV-2 RDRP RESP QL NAA+PROBE: NEGATIVE

## 2021-08-24 PROCEDURE — 3080F PR MOST RECENT DIASTOLIC BLOOD PRESSURE >= 90 MM HG: ICD-10-PCS | Mod: CPTII,S$GLB,, | Performed by: SURGERY

## 2021-08-24 PROCEDURE — 3077F PR MOST RECENT SYSTOLIC BLOOD PRESSURE >= 140 MM HG: ICD-10-PCS | Mod: CPTII,S$GLB,, | Performed by: SURGERY

## 2021-08-24 PROCEDURE — 1160F PR REVIEW ALL MEDS BY PRESCRIBER/CLIN PHARMACIST DOCUMENTED: ICD-10-PCS | Mod: CPTII,S$GLB,, | Performed by: SURGERY

## 2021-08-24 PROCEDURE — 3077F SYST BP >= 140 MM HG: CPT | Mod: CPTII,S$GLB,, | Performed by: SURGERY

## 2021-08-24 PROCEDURE — 3008F BODY MASS INDEX DOCD: CPT | Mod: CPTII,S$GLB,, | Performed by: SURGERY

## 2021-08-24 PROCEDURE — 3008F PR BODY MASS INDEX (BMI) DOCUMENTED: ICD-10-PCS | Mod: CPTII,S$GLB,, | Performed by: SURGERY

## 2021-08-24 PROCEDURE — U0002: ICD-10-PCS | Mod: QW,S$GLB,, | Performed by: SURGERY

## 2021-08-24 PROCEDURE — 99214 OFFICE O/P EST MOD 30 MIN: CPT | Mod: S$GLB,CS,, | Performed by: SURGERY

## 2021-08-24 PROCEDURE — U0002 COVID-19 LAB TEST NON-CDC: HCPCS | Mod: QW,S$GLB,, | Performed by: SURGERY

## 2021-08-24 PROCEDURE — 1160F RVW MEDS BY RX/DR IN RCRD: CPT | Mod: CPTII,S$GLB,, | Performed by: SURGERY

## 2021-08-24 PROCEDURE — 1159F MED LIST DOCD IN RCRD: CPT | Mod: CPTII,S$GLB,, | Performed by: SURGERY

## 2021-08-24 PROCEDURE — S0119 ONDANSETRON 4 MG: HCPCS | Mod: S$GLB,,, | Performed by: SURGERY

## 2021-08-24 PROCEDURE — 1159F PR MEDICATION LIST DOCUMENTED IN MEDICAL RECORD: ICD-10-PCS | Mod: CPTII,S$GLB,, | Performed by: SURGERY

## 2021-08-24 PROCEDURE — 99214 PR OFFICE/OUTPT VISIT, EST, LEVL IV, 30-39 MIN: ICD-10-PCS | Mod: S$GLB,CS,, | Performed by: SURGERY

## 2021-08-24 PROCEDURE — 3080F DIAST BP >= 90 MM HG: CPT | Mod: CPTII,S$GLB,, | Performed by: SURGERY

## 2021-08-24 PROCEDURE — S0119 PR ONDANSETRON, ORAL, 4MG: ICD-10-PCS | Mod: S$GLB,,, | Performed by: SURGERY

## 2021-08-24 RX ORDER — ONDANSETRON 4 MG/1
4 TABLET, ORALLY DISINTEGRATING ORAL EVERY 8 HOURS PRN
Qty: 12 TABLET | Refills: 0 | Status: SHIPPED | OUTPATIENT
Start: 2021-08-24 | End: 2021-08-29

## 2021-08-24 RX ORDER — ONDANSETRON 4 MG/1
4 TABLET, ORALLY DISINTEGRATING ORAL
Status: COMPLETED | OUTPATIENT
Start: 2021-08-24 | End: 2021-08-24

## 2021-08-24 RX ORDER — BENZONATATE 200 MG/1
200 CAPSULE ORAL 3 TIMES DAILY PRN
Qty: 21 CAPSULE | Refills: 0 | Status: SHIPPED | OUTPATIENT
Start: 2021-08-24 | End: 2021-08-31

## 2021-08-24 RX ADMIN — ONDANSETRON 4 MG: 4 TABLET, ORALLY DISINTEGRATING ORAL at 10:08

## 2022-01-10 ENCOUNTER — OFFICE VISIT (OUTPATIENT)
Dept: FAMILY MEDICINE | Facility: CLINIC | Age: 32
End: 2022-01-10
Payer: COMMERCIAL

## 2022-01-10 VITALS
WEIGHT: 200.19 LBS | HEIGHT: 63 IN | OXYGEN SATURATION: 98 % | SYSTOLIC BLOOD PRESSURE: 132 MMHG | TEMPERATURE: 98 F | BODY MASS INDEX: 35.47 KG/M2 | DIASTOLIC BLOOD PRESSURE: 86 MMHG | HEART RATE: 78 BPM

## 2022-01-10 DIAGNOSIS — L50.3 DERMATOGRAPHIC URTICARIA: Primary | ICD-10-CM

## 2022-01-10 PROCEDURE — 3079F DIAST BP 80-89 MM HG: CPT | Mod: CPTII,S$GLB,, | Performed by: FAMILY MEDICINE

## 2022-01-10 PROCEDURE — 99999 PR PBB SHADOW E&M-EST. PATIENT-LVL IV: CPT | Mod: PBBFAC,,, | Performed by: FAMILY MEDICINE

## 2022-01-10 PROCEDURE — 1159F MED LIST DOCD IN RCRD: CPT | Mod: CPTII,S$GLB,, | Performed by: FAMILY MEDICINE

## 2022-01-10 PROCEDURE — 99999 PR PBB SHADOW E&M-EST. PATIENT-LVL IV: ICD-10-PCS | Mod: PBBFAC,,, | Performed by: FAMILY MEDICINE

## 2022-01-10 PROCEDURE — 1160F RVW MEDS BY RX/DR IN RCRD: CPT | Mod: CPTII,S$GLB,, | Performed by: FAMILY MEDICINE

## 2022-01-10 PROCEDURE — 3008F BODY MASS INDEX DOCD: CPT | Mod: CPTII,S$GLB,, | Performed by: FAMILY MEDICINE

## 2022-01-10 PROCEDURE — 1160F PR REVIEW ALL MEDS BY PRESCRIBER/CLIN PHARMACIST DOCUMENTED: ICD-10-PCS | Mod: CPTII,S$GLB,, | Performed by: FAMILY MEDICINE

## 2022-01-10 PROCEDURE — 3008F PR BODY MASS INDEX (BMI) DOCUMENTED: ICD-10-PCS | Mod: CPTII,S$GLB,, | Performed by: FAMILY MEDICINE

## 2022-01-10 PROCEDURE — 3075F SYST BP GE 130 - 139MM HG: CPT | Mod: CPTII,S$GLB,, | Performed by: FAMILY MEDICINE

## 2022-01-10 PROCEDURE — 3079F PR MOST RECENT DIASTOLIC BLOOD PRESSURE 80-89 MM HG: ICD-10-PCS | Mod: CPTII,S$GLB,, | Performed by: FAMILY MEDICINE

## 2022-01-10 PROCEDURE — 99213 OFFICE O/P EST LOW 20 MIN: CPT | Mod: S$GLB,,, | Performed by: FAMILY MEDICINE

## 2022-01-10 PROCEDURE — 1159F PR MEDICATION LIST DOCUMENTED IN MEDICAL RECORD: ICD-10-PCS | Mod: CPTII,S$GLB,, | Performed by: FAMILY MEDICINE

## 2022-01-10 PROCEDURE — 3075F PR MOST RECENT SYSTOLIC BLOOD PRESS GE 130-139MM HG: ICD-10-PCS | Mod: CPTII,S$GLB,, | Performed by: FAMILY MEDICINE

## 2022-01-10 PROCEDURE — 99213 PR OFFICE/OUTPT VISIT, EST, LEVL III, 20-29 MIN: ICD-10-PCS | Mod: S$GLB,,, | Performed by: FAMILY MEDICINE

## 2022-01-10 RX ORDER — LEVOCETIRIZINE DIHYDROCHLORIDE 5 MG/1
5 TABLET, FILM COATED ORAL NIGHTLY
Qty: 30 TABLET | Refills: 2 | Status: SHIPPED | OUTPATIENT
Start: 2022-01-10 | End: 2022-03-31

## 2022-01-10 RX ORDER — PREDNISONE 20 MG/1
40 TABLET ORAL DAILY
Qty: 10 TABLET | Refills: 0 | Status: SHIPPED | OUTPATIENT
Start: 2022-01-10 | End: 2022-01-15

## 2022-01-10 RX ORDER — PAROXETINE 10 MG/1
10 TABLET, FILM COATED ORAL DAILY
COMMUNITY
Start: 2022-01-03 | End: 2022-02-08 | Stop reason: SDUPTHER

## 2022-01-10 NOTE — PROGRESS NOTES
Routine Office Visit    Patient Name: Geni Brownlee    : 1990  MRN: [id number]    Subjective:  Geni is a 31 y.o. female who presents today for:    1. Rash  Patient presenting with recurring itching and when she scratches whelps form.  She states that this started about 2 weeks after getting the booster of moderna.  There has was no rash within 72 hours of the vaccine.  She also went to spend [place] in [place] with family and states her mom used a lot of scented laundry detergents and dog shampoo.  She states that she takes benadryl at night, so that she is not sleepy at work.  She states that nothing like this has ever happened before.  No known allergies other than fish oil.      Past Medical History  Past Medical History:   Diagnosis Date    Depression        Past Surgical History  History reviewed. No pertinent surgical history.    Family History  Family History   Problem Relation Age of Onset    No Known Problems Mother     No Known Problems Father        Social History  Social History     Socioeconomic History    Marital status: Single   Tobacco Use    Smoking status: Current Some Day Smoker     Packs/day: 0.50     Types: Cigarettes     Last attempt to quit: 2020     Years since quittin.4    Smokeless tobacco: Never Used   Substance and Sexual Activity    Alcohol use: Yes    Drug use: No       Current Medications  Current Outpatient Medications on File Prior to Visit   Medication Sig Dispense Refill    paroxetine (PAXIL) 10 MG tablet Take 10 mg by mouth once daily.       No current facility-administered medications on file prior to visit.       Allergies   Review of patient's allergies indicates:   Allergen Reactions    Fish oil Itching     Allergic to seafood       Review of Systems (Pertinent positives)  Review of Systems   Constitutional: Negative.    HENT: Negative.    Eyes: Negative.    Respiratory: Negative.    Cardiovascular: Negative.    Musculoskeletal: Negative.   "  Skin: Positive for itching and rash.   Neurological: Negative.          /86 (BP Location: Left arm, Patient Position: Sitting, BP Method: Medium (Manual))   Pulse 78   Temp 98.2 °F (36.8 °C) (Oral)   Ht 5' 3" (1.6 m)   Wt 90.8 kg (200 lb 2.8 oz)   LMP 12/25/2021   SpO2 98%   BMI 35.46 kg/m²     GENERAL APPEARANCE: in no apparent distress and well developed and well nourished  HEENT: PERRL, EOMI, Sclera clear, anicteric, Oropharynx clear, no lesions, Neck supple with midline trachea  NECK: normal, supple, no adenopathy, thyroid normal in size  RESPIRATORY: appears well, vitals normal, no respiratory distress, acyanotic, normal RR, chest clear, no wheezing, crepitations, rhonchi, normal symmetric air entry  HEART: regular rate and rhythm, S1, S2 normal, no murmur, click, rub or gallop.    ABDOMEN: abdomen is soft without tenderness, no masses, no hernias, no organomegaly, no rebound, no guarding. Suprapubic tenderness absent. No CVA tenderness.  SKIN: urticaria where she scratches; pictures of dermatographia on back  PSYCH: Alert, oriented x 3, thought content appropriate, speech normal, pleasant and cooperative, good eye contact, well groomed    Assessment/Plan:  Geni Brownlee is a 31 y.o. female who presents today for :    Geni was seen today for establish care and rash.    Diagnoses and all orders for this visit:    Dermatographic urticaria  -     CBC Auto Differential; Future  -     Comprehensive Metabolic Panel; Future  -     Ambulatory referral/consult to Dermatology; Future  -     levocetirizine (XYZAL) 5 MG tablet; Take 1 tablet (5 mg total) by mouth every evening.  -     predniSONE (DELTASONE) 20 MG tablet; Take 2 tablets (40 mg total) by mouth once daily. for 5 days      1.  Labs to be done tomorrow  2.  Start xyzal tonight and prednisone tomorrow  3.  Referral to dermatology placed  4.  Call should symptoms persist      Yordan Huynh MD      "

## 2022-01-11 ENCOUNTER — LAB VISIT (OUTPATIENT)
Dept: LAB | Facility: HOSPITAL | Age: 32
End: 2022-01-11
Attending: FAMILY MEDICINE
Payer: COMMERCIAL

## 2022-01-11 ENCOUNTER — PATIENT MESSAGE (OUTPATIENT)
Dept: ADMINISTRATIVE | Facility: OTHER | Age: 32
End: 2022-01-11
Payer: COMMERCIAL

## 2022-01-11 DIAGNOSIS — L50.3 DERMATOGRAPHIC URTICARIA: ICD-10-CM

## 2022-01-11 LAB
ALBUMIN SERPL BCP-MCNC: 4.2 G/DL (ref 3.5–5.2)
ALP SERPL-CCNC: 45 U/L (ref 55–135)
ALT SERPL W/O P-5'-P-CCNC: 37 U/L (ref 10–44)
ANION GAP SERPL CALC-SCNC: 8 MMOL/L (ref 8–16)
AST SERPL-CCNC: 20 U/L (ref 10–40)
BASOPHILS # BLD AUTO: 0.02 K/UL (ref 0–0.2)
BASOPHILS NFR BLD: 0.2 % (ref 0–1.9)
BILIRUB SERPL-MCNC: 0.3 MG/DL (ref 0.1–1)
BUN SERPL-MCNC: 9 MG/DL (ref 6–20)
CALCIUM SERPL-MCNC: 9.6 MG/DL (ref 8.7–10.5)
CHLORIDE SERPL-SCNC: 103 MMOL/L (ref 95–110)
CO2 SERPL-SCNC: 24 MMOL/L (ref 23–29)
CREAT SERPL-MCNC: 0.9 MG/DL (ref 0.5–1.4)
DIFFERENTIAL METHOD: ABNORMAL
EOSINOPHIL # BLD AUTO: 0 K/UL (ref 0–0.5)
EOSINOPHIL NFR BLD: 0 % (ref 0–8)
ERYTHROCYTE [DISTWIDTH] IN BLOOD BY AUTOMATED COUNT: 12.3 % (ref 11.5–14.5)
EST. GFR  (AFRICAN AMERICAN): >60 ML/MIN/1.73 M^2
EST. GFR  (NON AFRICAN AMERICAN): >60 ML/MIN/1.73 M^2
GLUCOSE SERPL-MCNC: 160 MG/DL (ref 70–110)
HCT VFR BLD AUTO: 39.8 % (ref 37–48.5)
HGB BLD-MCNC: 13.8 G/DL (ref 12–16)
IMM GRANULOCYTES # BLD AUTO: 0.03 K/UL (ref 0–0.04)
IMM GRANULOCYTES NFR BLD AUTO: 0.4 % (ref 0–0.5)
LYMPHOCYTES # BLD AUTO: 0.9 K/UL (ref 1–4.8)
LYMPHOCYTES NFR BLD: 10.1 % (ref 18–48)
MCH RBC QN AUTO: 32 PG (ref 27–31)
MCHC RBC AUTO-ENTMCNC: 34.7 G/DL (ref 32–36)
MCV RBC AUTO: 92 FL (ref 82–98)
MONOCYTES # BLD AUTO: 0.3 K/UL (ref 0.3–1)
MONOCYTES NFR BLD: 3.2 % (ref 4–15)
NEUTROPHILS # BLD AUTO: 7.2 K/UL (ref 1.8–7.7)
NEUTROPHILS NFR BLD: 86.1 % (ref 38–73)
NRBC BLD-RTO: 0 /100 WBC
PLATELET # BLD AUTO: 222 K/UL (ref 150–450)
PMV BLD AUTO: 10.9 FL (ref 9.2–12.9)
POTASSIUM SERPL-SCNC: 4.2 MMOL/L (ref 3.5–5.1)
PROT SERPL-MCNC: 7.6 G/DL (ref 6–8.4)
RBC # BLD AUTO: 4.31 M/UL (ref 4–5.4)
SODIUM SERPL-SCNC: 135 MMOL/L (ref 136–145)
WBC # BLD AUTO: 8.38 K/UL (ref 3.9–12.7)

## 2022-01-11 PROCEDURE — 80053 COMPREHEN METABOLIC PANEL: CPT | Performed by: FAMILY MEDICINE

## 2022-01-11 PROCEDURE — 36415 COLL VENOUS BLD VENIPUNCTURE: CPT | Performed by: FAMILY MEDICINE

## 2022-01-11 PROCEDURE — 85025 COMPLETE CBC W/AUTO DIFF WBC: CPT | Performed by: FAMILY MEDICINE

## 2022-01-13 ENCOUNTER — PATIENT MESSAGE (OUTPATIENT)
Dept: FAMILY MEDICINE | Facility: CLINIC | Age: 32
End: 2022-01-13
Payer: COMMERCIAL

## 2022-01-13 DIAGNOSIS — L50.3 DERMATOGRAPHIC URTICARIA: Primary | ICD-10-CM

## 2022-01-14 ENCOUNTER — LAB VISIT (OUTPATIENT)
Dept: LAB | Facility: OTHER | Age: 32
End: 2022-01-14
Attending: PHYSICIAN ASSISTANT
Payer: COMMERCIAL

## 2022-01-14 ENCOUNTER — OFFICE VISIT (OUTPATIENT)
Dept: OBSTETRICS AND GYNECOLOGY | Facility: CLINIC | Age: 32
End: 2022-01-14
Payer: COMMERCIAL

## 2022-01-14 VITALS
DIASTOLIC BLOOD PRESSURE: 72 MMHG | BODY MASS INDEX: 35.27 KG/M2 | SYSTOLIC BLOOD PRESSURE: 118 MMHG | HEIGHT: 63 IN | WEIGHT: 199.06 LBS

## 2022-01-14 DIAGNOSIS — Z11.3 SCREEN FOR STD (SEXUALLY TRANSMITTED DISEASE): ICD-10-CM

## 2022-01-14 DIAGNOSIS — Z01.419 WELL WOMAN EXAM: Primary | ICD-10-CM

## 2022-01-14 DIAGNOSIS — Z30.09 ENCOUNTER FOR COUNSELING REGARDING CONTRACEPTION: ICD-10-CM

## 2022-01-14 DIAGNOSIS — Z01.419 ENCOUNTER FOR CERVICAL PAP SMEAR WITH PELVIC EXAM: ICD-10-CM

## 2022-01-14 LAB
B-HCG UR QL: NEGATIVE
CTP QC/QA: YES

## 2022-01-14 PROCEDURE — 87491 CHLMYD TRACH DNA AMP PROBE: CPT | Performed by: PHYSICIAN ASSISTANT

## 2022-01-14 PROCEDURE — 81025 URINE PREGNANCY TEST: CPT | Mod: S$GLB,,, | Performed by: PHYSICIAN ASSISTANT

## 2022-01-14 PROCEDURE — 87481 CANDIDA DNA AMP PROBE: CPT | Mod: 59 | Performed by: PHYSICIAN ASSISTANT

## 2022-01-14 PROCEDURE — 3074F SYST BP LT 130 MM HG: CPT | Mod: CPTII,S$GLB,, | Performed by: PHYSICIAN ASSISTANT

## 2022-01-14 PROCEDURE — 87591 N.GONORRHOEAE DNA AMP PROB: CPT | Performed by: PHYSICIAN ASSISTANT

## 2022-01-14 PROCEDURE — 87801 DETECT AGNT MULT DNA AMPLI: CPT | Performed by: PHYSICIAN ASSISTANT

## 2022-01-14 PROCEDURE — 1159F PR MEDICATION LIST DOCUMENTED IN MEDICAL RECORD: ICD-10-PCS | Mod: CPTII,S$GLB,, | Performed by: PHYSICIAN ASSISTANT

## 2022-01-14 PROCEDURE — 36415 COLL VENOUS BLD VENIPUNCTURE: CPT | Performed by: PHYSICIAN ASSISTANT

## 2022-01-14 PROCEDURE — 3078F PR MOST RECENT DIASTOLIC BLOOD PRESSURE < 80 MM HG: ICD-10-PCS | Mod: CPTII,S$GLB,, | Performed by: PHYSICIAN ASSISTANT

## 2022-01-14 PROCEDURE — 99385 PR PREVENTIVE VISIT,NEW,18-39: ICD-10-PCS | Mod: S$GLB,,, | Performed by: PHYSICIAN ASSISTANT

## 2022-01-14 PROCEDURE — 99385 PREV VISIT NEW AGE 18-39: CPT | Mod: S$GLB,,, | Performed by: PHYSICIAN ASSISTANT

## 2022-01-14 PROCEDURE — 3008F BODY MASS INDEX DOCD: CPT | Mod: CPTII,S$GLB,, | Performed by: PHYSICIAN ASSISTANT

## 2022-01-14 PROCEDURE — 81025 POCT URINE PREGNANCY: ICD-10-PCS | Mod: S$GLB,,, | Performed by: PHYSICIAN ASSISTANT

## 2022-01-14 PROCEDURE — 99999 PR PBB SHADOW E&M-EST. PATIENT-LVL III: CPT | Mod: PBBFAC,,, | Performed by: PHYSICIAN ASSISTANT

## 2022-01-14 PROCEDURE — 3074F PR MOST RECENT SYSTOLIC BLOOD PRESSURE < 130 MM HG: ICD-10-PCS | Mod: CPTII,S$GLB,, | Performed by: PHYSICIAN ASSISTANT

## 2022-01-14 PROCEDURE — 1159F MED LIST DOCD IN RCRD: CPT | Mod: CPTII,S$GLB,, | Performed by: PHYSICIAN ASSISTANT

## 2022-01-14 PROCEDURE — 86592 SYPHILIS TEST NON-TREP QUAL: CPT | Performed by: PHYSICIAN ASSISTANT

## 2022-01-14 PROCEDURE — 3078F DIAST BP <80 MM HG: CPT | Mod: CPTII,S$GLB,, | Performed by: PHYSICIAN ASSISTANT

## 2022-01-14 PROCEDURE — 99999 PR PBB SHADOW E&M-EST. PATIENT-LVL III: ICD-10-PCS | Mod: PBBFAC,,, | Performed by: PHYSICIAN ASSISTANT

## 2022-01-14 PROCEDURE — 87340 HEPATITIS B SURFACE AG IA: CPT | Performed by: PHYSICIAN ASSISTANT

## 2022-01-14 PROCEDURE — 3008F PR BODY MASS INDEX (BMI) DOCUMENTED: ICD-10-PCS | Mod: CPTII,S$GLB,, | Performed by: PHYSICIAN ASSISTANT

## 2022-01-14 PROCEDURE — 88142 CYTOPATH C/V THIN LAYER: CPT | Performed by: PHYSICIAN ASSISTANT

## 2022-01-14 PROCEDURE — 1160F PR REVIEW ALL MEDS BY PRESCRIBER/CLIN PHARMACIST DOCUMENTED: ICD-10-PCS | Mod: CPTII,S$GLB,, | Performed by: PHYSICIAN ASSISTANT

## 2022-01-14 PROCEDURE — 1160F RVW MEDS BY RX/DR IN RCRD: CPT | Mod: CPTII,S$GLB,, | Performed by: PHYSICIAN ASSISTANT

## 2022-01-14 PROCEDURE — 87624 HPV HI-RISK TYP POOLED RSLT: CPT | Performed by: PHYSICIAN ASSISTANT

## 2022-01-14 PROCEDURE — 87389 HIV-1 AG W/HIV-1&-2 AB AG IA: CPT | Performed by: PHYSICIAN ASSISTANT

## 2022-01-17 LAB
HBV SURFACE AG SERPL QL IA: NEGATIVE
HIV 1+2 AB+HIV1 P24 AG SERPL QL IA: NEGATIVE

## 2022-01-18 LAB
C TRACH DNA SPEC QL NAA+PROBE: NOT DETECTED
N GONORRHOEA DNA SPEC QL NAA+PROBE: NOT DETECTED
RPR SER QL: NORMAL

## 2022-01-19 ENCOUNTER — TELEPHONE (OUTPATIENT)
Dept: ADMINISTRATIVE | Facility: HOSPITAL | Age: 32
End: 2022-01-19
Payer: COMMERCIAL

## 2022-01-19 NOTE — PROGRESS NOTES
Subjective:       Patient ID: Geni Brownlee is a 31 y.o. female.    Chief Complaint   Patient presents with    Well Woman       History of Present Illness:    Geni Brownlee is a  31 y.o. woman who presents for her annual exam.     Annual Exam-Premenopausal  Patient presents for annual exam. The patient has no complaints today. The patient is sexually active. GYN screening history: no prior history of gyn screening tests. The patient reports that there is not domestic violence in her life.  Contraception Counseling  Patient presents for contraception counseling. The patient has no complaints today. The patient is sexually active. Pertinent past medical history: none.    Vaginal Discharge and Irritation  Patient presents for vaginal discharge check. Sexual history reviewed with the patient. STD exposure: denies knowledge of risky exposure.  Previous history of STD:  chlamydia. Current symptoms include none.  Contraception: none.    Covid vaccine status: vaccinated  Flu vaccine status: vaccinated      Menstrual History: reports periods are monthly - about 28 days apart; periods last 4-5 days; denies heavy bleeding or cramping  Obstetric Hx:   STD/STI Hx: h/o chlamydia in the past; treated  Contraception: Pt currently interested in IUD - mirena      GYN & OB History  Patient's last menstrual period was 2021 (exact date).   Date of last PAP: No hx    OB History    Para Term  AB Living   0 0 0 0 0 0   SAB IAB Ectopic Multiple Live Births   0 0 0 0 0        Past Medical History:   Diagnosis Date    Depression         History reviewed. No pertinent surgical history.     Social History     Socioeconomic History    Marital status: Single   Tobacco Use    Smoking status: Current Every Day Smoker     Packs/day: 0.50     Types: Cigarettes     Last attempt to quit: 2020     Years since quittin.4    Smokeless tobacco: Never Used   Substance and Sexual Activity    Alcohol use:  Yes    Drug use: No    Sexual activity: Yes     Partners: Male     Birth control/protection: None        Family History   Problem Relation Age of Onset    No Known Problems Mother     No Known Problems Father     Breast cancer Maternal Aunt     Colon cancer Neg Hx     Ovarian cancer Neg Hx           ROS:  GENERAL: Feeling well overall. Denies fever or chills.   SKIN: Denies rash or lesions.   HEAD: Denies head injury or headache.   NODES: Denies enlarged lymph nodes.   CHEST: Denies chest pain or shortness of breath.   CARDIOVASCULAR: Denies palpitations or left sided chest pain.   ABDOMEN: No abdominal pain, constipation, diarrhea, nausea, vomiting or rectal bleeding.   URINARY: No dysuria, hematuria, or burning on urination.  REPRODUCTIVE: See HPI.   BREASTS: Denies pain, lumps, or nipple discharge.   HEMATOLOGIC: No easy bruisability or excessive bleeding.   MUSCULOSKELETAL: Denies joint pain or swelling.   NEUROLOGIC: Denies syncope or weakness.   PSYCHIATRIC: Denies depression, anxiety or mood swings.      Objective:     PE:   APPEARANCE: Well nourished, well developed female in no acute distress.  NODES: no cervical, supraclavicular, or inguinal lymphadenopathy  BREASTS: Symmetrical, no skin changes or visible lesions. No palpable masses, nipple discharge or adenopathy bilaterally.  ABDOMEN: Soft. No tenderness or masses. No distention. No hernias palpated. No CVA tenderness.  VULVA: No lesions. Normal external female genitalia.  URETHRAL MEATUS: Normal size and location, no lesions, no prolapse.  URETHRA: No masses, tenderness, or prolapse.  VAGINA: Moist. No lesions or lacerations noted. No abnormal discharge present. No odor present.   CERVIX: No lesions or discharge. No cervical motion tenderness.   UTERUS: Normal size, regular shape, mobile, non-tender.  ADNEXA: No tenderness. No fullness or masses palpated in the adnexal regions.   ANUS PERINEUM: Normal.  SKIN: No rashes, lesions, ulcers, acne,  hirsutism.  RESP: Normal respiratory effort.  MENTAL STATUS: Alert, oriented x 3, normal affect and mood.    Assessment:     1. Well woman exam    2. Encounter for cervical Pap smear with pelvic exam    3. Screen for STD (sexually transmitted disease)    4. Encounter for counseling regarding contraception         Negative UPT in office today    Plan:     Well woman exam  -     POCT Urine Pregnancy    Encounter for cervical Pap smear with pelvic exam  -     Liquid-Based Pap Smear, Screening  -     HPV High Risk Genotypes, PCR    Screen for STD (sexually transmitted disease)  -     Vaginosis Screen by DNA Probe  -     C. trachomatis/N. gonorrhoeae by AMP DNA [facility]; Cervix  -     HIV 1/2 Ag/Ab (4th Gen); Future; Expected date: 01/14/2022  -     RPR; Future; Expected date: 01/14/2022  -     Hepatitis B Surface Antigen; Future; Expected date: 01/14/2022    Encounter for counseling regarding contraception  -     POCT Urine Pregnancy  -     Device Authorization Order         Well Woman:    - Pap smear: collected  - Birth control: Patient was counseled today on contraceptive options: barrier, hormonal (OCPs, Depo-Provera, NuvaRing, Nexplanon), IUDs (Kyleena, Mirena, ParaGard), etc. Pt would like mirena - device authorization order placed and appointment scheduled  - GC/CT: collected  - Affirm: collected  - RPR/HIV/Hep B: ordered and scheduled in lab  - Mammogram: n/a  - Smoking cessation: n/a  - Vaccines: covid and flu vaccinated      Patient was counseled today on the new ACS guidelines for cervical cytology screening as well as the current recommendations for breast cancer screening. She was counseled to follow up with her PCP for other routine health maintenance.     F/u in 1 year or sooner PRN.    Lisa Maldonado PA-C

## 2022-01-20 DIAGNOSIS — N76.0 BACTERIAL VAGINOSIS: Primary | ICD-10-CM

## 2022-01-20 DIAGNOSIS — B96.89 BACTERIAL VAGINOSIS: Primary | ICD-10-CM

## 2022-01-20 LAB
BACTERIAL VAGINOSIS DNA: POSITIVE
CANDIDA GLABRATA DNA: NEGATIVE
CANDIDA KRUSEI DNA: NEGATIVE
CANDIDA RRNA VAG QL PROBE: NEGATIVE
T VAGINALIS RRNA GENITAL QL PROBE: NEGATIVE

## 2022-01-20 RX ORDER — METRONIDAZOLE 500 MG/1
500 TABLET ORAL EVERY 12 HOURS
Qty: 14 TABLET | Refills: 0 | Status: SHIPPED | OUTPATIENT
Start: 2022-01-20 | End: 2022-01-27

## 2022-01-21 LAB
FINAL PATHOLOGIC DIAGNOSIS: NORMAL
HPV HR 12 DNA SPEC QL NAA+PROBE: NEGATIVE
HPV16 AG SPEC QL: NEGATIVE
HPV18 DNA SPEC QL NAA+PROBE: NEGATIVE
Lab: NORMAL

## 2022-02-08 ENCOUNTER — PATIENT MESSAGE (OUTPATIENT)
Dept: FAMILY MEDICINE | Facility: CLINIC | Age: 32
End: 2022-02-08
Payer: COMMERCIAL

## 2022-02-08 RX ORDER — PAROXETINE 10 MG/1
10 TABLET, FILM COATED ORAL DAILY
Qty: 30 TABLET | Refills: 2 | Status: SHIPPED | OUTPATIENT
Start: 2022-02-08 | End: 2022-02-16 | Stop reason: SDUPTHER

## 2022-02-16 ENCOUNTER — PATIENT MESSAGE (OUTPATIENT)
Dept: FAMILY MEDICINE | Facility: CLINIC | Age: 32
End: 2022-02-16
Payer: COMMERCIAL

## 2022-02-16 RX ORDER — PAROXETINE 10 MG/1
10 TABLET, FILM COATED ORAL DAILY
Qty: 30 TABLET | Refills: 2 | Status: SHIPPED | OUTPATIENT
Start: 2022-02-16 | End: 2022-02-16 | Stop reason: SDUPTHER

## 2022-02-16 RX ORDER — PAROXETINE 10 MG/1
10 TABLET, FILM COATED ORAL DAILY
Qty: 30 TABLET | Refills: 2 | Status: SHIPPED | OUTPATIENT
Start: 2022-02-16

## 2022-02-18 ENCOUNTER — OFFICE VISIT (OUTPATIENT)
Dept: URGENT CARE | Facility: CLINIC | Age: 32
End: 2022-02-18
Payer: COMMERCIAL

## 2022-02-18 VITALS
HEIGHT: 63 IN | DIASTOLIC BLOOD PRESSURE: 89 MMHG | BODY MASS INDEX: 35.26 KG/M2 | RESPIRATION RATE: 17 BRPM | WEIGHT: 199 LBS | TEMPERATURE: 98 F | SYSTOLIC BLOOD PRESSURE: 142 MMHG | OXYGEN SATURATION: 96 % | HEART RATE: 77 BPM

## 2022-02-18 DIAGNOSIS — J32.9 SINUSITIS, UNSPECIFIED CHRONICITY, UNSPECIFIED LOCATION: Primary | ICD-10-CM

## 2022-02-18 DIAGNOSIS — J02.9 SORE THROAT: ICD-10-CM

## 2022-02-18 LAB
CTP QC/QA: YES
SARS-COV-2 RDRP RESP QL NAA+PROBE: NEGATIVE

## 2022-02-18 PROCEDURE — 1160F RVW MEDS BY RX/DR IN RCRD: CPT | Mod: CPTII,S$GLB,, | Performed by: FAMILY MEDICINE

## 2022-02-18 PROCEDURE — 3008F PR BODY MASS INDEX (BMI) DOCUMENTED: ICD-10-PCS | Mod: CPTII,S$GLB,, | Performed by: FAMILY MEDICINE

## 2022-02-18 PROCEDURE — 99213 OFFICE O/P EST LOW 20 MIN: CPT | Mod: S$GLB,,, | Performed by: FAMILY MEDICINE

## 2022-02-18 PROCEDURE — U0002 COVID-19 LAB TEST NON-CDC: HCPCS | Mod: QW,S$GLB,, | Performed by: FAMILY MEDICINE

## 2022-02-18 PROCEDURE — 3077F SYST BP >= 140 MM HG: CPT | Mod: CPTII,S$GLB,, | Performed by: FAMILY MEDICINE

## 2022-02-18 PROCEDURE — 1159F PR MEDICATION LIST DOCUMENTED IN MEDICAL RECORD: ICD-10-PCS | Mod: CPTII,S$GLB,, | Performed by: FAMILY MEDICINE

## 2022-02-18 PROCEDURE — 3079F DIAST BP 80-89 MM HG: CPT | Mod: CPTII,S$GLB,, | Performed by: FAMILY MEDICINE

## 2022-02-18 PROCEDURE — 3008F BODY MASS INDEX DOCD: CPT | Mod: CPTII,S$GLB,, | Performed by: FAMILY MEDICINE

## 2022-02-18 PROCEDURE — U0002: ICD-10-PCS | Mod: QW,S$GLB,, | Performed by: FAMILY MEDICINE

## 2022-02-18 PROCEDURE — 1160F PR REVIEW ALL MEDS BY PRESCRIBER/CLIN PHARMACIST DOCUMENTED: ICD-10-PCS | Mod: CPTII,S$GLB,, | Performed by: FAMILY MEDICINE

## 2022-02-18 PROCEDURE — 1159F MED LIST DOCD IN RCRD: CPT | Mod: CPTII,S$GLB,, | Performed by: FAMILY MEDICINE

## 2022-02-18 PROCEDURE — 3079F PR MOST RECENT DIASTOLIC BLOOD PRESSURE 80-89 MM HG: ICD-10-PCS | Mod: CPTII,S$GLB,, | Performed by: FAMILY MEDICINE

## 2022-02-18 PROCEDURE — 99213 PR OFFICE/OUTPT VISIT, EST, LEVL III, 20-29 MIN: ICD-10-PCS | Mod: S$GLB,,, | Performed by: FAMILY MEDICINE

## 2022-02-18 PROCEDURE — 3077F PR MOST RECENT SYSTOLIC BLOOD PRESSURE >= 140 MM HG: ICD-10-PCS | Mod: CPTII,S$GLB,, | Performed by: FAMILY MEDICINE

## 2022-02-18 RX ORDER — PREDNISONE 20 MG/1
40 TABLET ORAL DAILY
Qty: 6 TABLET | Refills: 0 | Status: SHIPPED | OUTPATIENT
Start: 2022-02-18 | End: 2022-02-21

## 2022-02-18 NOTE — PROGRESS NOTES
"Subjective:       Patient ID: Geni Brownlee is a 31 y.o. female.    Vitals:  height is 5' 2.99" (1.6 m) and weight is 90.3 kg (199 lb). Her oral temperature is 98.2 °F (36.8 °C). Her blood pressure is 142/89 (abnormal) and her pulse is 77. Her respiration is 17 and oxygen saturation is 96%.     Chief Complaint: Sore Throat (Sore throat, cough, fatigue - Entered by patient)    31-year-old female, COVID vaccinated including booster.  Complaints of sore throat, cough, sinus pressure and fatigue for 2 days.  No known close contact COVID exposures.  History suggestive of allergic rhinitis.  On Xyzal for dermatographism that is being worked up.  Smoker.  No consistently colored nasal drainage or sputum.        Sore Throat   This is a new problem. The current episode started in the past 7 days. The problem has been gradually worsening. Neither side of throat is experiencing more pain than the other. There has been no fever. Associated symptoms include congestion, coughing and headaches. Pertinent negatives include no shortness of breath. Treatments tried: Thermaflu. The treatment provided no relief.       HENT: Positive for congestion and sore throat.    Respiratory: Positive for cough. Negative for shortness of breath.    Neurological: Positive for headaches.       Objective:      Physical Exam   Constitutional: She is oriented to person, place, and time. She appears well-developed and well-nourished. She is cooperative.  Non-toxic appearance. She does not have a sickly appearance. She does not appear ill. No distress.   HENT:   Head: Normocephalic and atraumatic.   Ears:   Right Ear: Hearing, external ear and ear canal normal.   Left Ear: Hearing, tympanic membrane, external ear and ear canal normal.      Comments: Right TM with serous changes, no erythema  Nose: Nose normal. No mucosal edema, rhinorrhea or nasal deformity. No epistaxis. Right sinus exhibits no maxillary sinus tenderness and no frontal sinus " tenderness. Left sinus exhibits no maxillary sinus tenderness and no frontal sinus tenderness.   Mouth/Throat: Uvula is midline, oropharynx is clear and moist and mucous membranes are normal. No trismus in the jaw. Normal dentition. No uvula swelling. No oropharyngeal exudate, posterior oropharyngeal edema or posterior oropharyngeal erythema.      Comments: Pharynx with cobblestoning, otherwise negative  Eyes: Conjunctivae and lids are normal. No scleral icterus.   Neck: Trachea normal and phonation normal. Neck supple. No edema present. No erythema present. No neck rigidity present.   Cardiovascular: Normal rate, regular rhythm, normal heart sounds, intact distal pulses and normal pulses.   Pulmonary/Chest: Effort normal and breath sounds normal. No stridor. No respiratory distress. She has no decreased breath sounds. She has no wheezes. She has no rhonchi. She has no rales.   Abdominal: Normal appearance.   Musculoskeletal: Normal range of motion.         General: No deformity or edema. Normal range of motion.   Neurological: She is alert and oriented to person, place, and time. She exhibits normal muscle tone. Coordination normal.   Skin: Skin is warm, dry, intact, not diaphoretic and not pale.   Psychiatric: She has a normal mood and affect. Her speech is normal and behavior is normal. Judgment and thought content normal. Cognition and memory  Nursing note and vitals reviewed.        Results for orders placed or performed in visit on 02/18/22   POCT COVID-19 Rapid Screening   Result Value Ref Range    POC Rapid COVID Negative Negative     Acceptable Yes      Assessment:       1. Sinusitis, unspecified chronicity, unspecified location    2. Sore throat          Plan:         Sinusitis, unspecified chronicity, unspecified location  -     predniSONE (DELTASONE) 20 MG tablet; Take 2 tablets (40 mg total) by mouth once daily. for 3 days  Dispense: 6 tablet; Refill: 0    Sore throat  -     POCT COVID-19  Rapid Screening    TRY A NONSEDATING ANTIHISTAMINE SUCH AS ZYRTEC OR CLARITAN OR ALLEGRA OR XYZAL (OR GENERICS FOR THESE) DAILY AS NEEDED.     TRY USING FLONASE, 2 INHALATIONS EACH NOSTRIL 1-2 TIMES DAILY, AND ON A REGULAR BASIS DURING DIFFICULT TIMES TO ADDRESS YOUR ALLERGY SYMPTOMS.       Make sure that you follow up with your primary care doctor in the next 2-5 days if needed .  Return to the Urgent Care if signs or symptoms change and certainly if you have worsening symptoms go to the nearest emergency department for further evaluation.

## 2022-02-18 NOTE — PATIENT INSTRUCTIONS
Patient Education       Sinusitis in Adults   The Basics   Written by the doctors and editors at [facility]   What is sinusitis? -- Sinusitis is a condition that can cause a stuffy nose, pain in the face, and discharge (mucus) from the nose. The sinuses are hollow areas in the bones of the face (figure 1). They have a thin lining that normally makes a small amount of mucus. When this lining gets irritated or infected, it swells and makes extra mucus. This causes symptoms.  Sinusitis can occur when a person gets sick with a cold. The germs causing the cold can also infect the sinuses. Many times, a person feels like their cold is getting better. But then they get sinusitis and begin to feel sick again.  What are the symptoms of sinusitis? -- Common symptoms of sinusitis include:  · Stuffy or blocked nose  · Thick white, yellow, or green discharge from the nose  · Pain in the teeth  · Pain or pressure in the face - This often feels worse when a person bends forward.  People with sinusitis can also have other symptoms that include:  · Fever  · Cough  · Trouble smelling  · Ear pressure or fullness  · Headache  · Bad breath  · Feeling tired  Most of the time, symptoms start to improve in 7 to 10 days.  Should I see a doctor or nurse? -- See your doctor or nurse if your symptoms last more than 10 days, or if your symptoms first get better but then get worse.  Rarely, sinusitis can lead to serious problems. See your doctor or nurse right away (do not wait 10 days) if you have:  · Fever higher than 102°F (38.9°C)  · Sudden and severe pain in the face and head  · Trouble seeing or seeing double  · Trouble thinking clearly  · Swelling or redness around one or both eyes  · A stiff neck  Is there anything I can do on my own to feel better? -- Yes. To reduce your symptoms, you can:  · Take an over-the-counter pain reliever to reduce the pain  · Rinse your nose and sinuses with salt water a few times a day - Ask your doctor or  "nurse about the best way to do this.  Your doctor might also prescribe a steroid nose spray to reduce the swelling in your nose. (These kinds of steroid nose sprays are safe to take, and do not contain the same steroids that some athletes take illegally.)  How is sinusitis treated? -- Most of the time, sinusitis does not need to be treated with antibiotic medicines. This is because most sinusitis is caused by viruses, not bacteria, and antibiotics do not kill viruses. In fact, even sinusitis caused by bacteria will usually get better on its own without antibiotics.   Some people with sinusitis do need treatment with antibiotics. If your symptoms have not improved after 10 days, ask your doctor if you should take antibiotics. Your doctor might recommend that you wait 1 more week to see if your symptoms improve. But if you have symptoms such as a fever or a lot of pain, they might prescribe antibiotics. It is important to follow your doctor's instructions about taking your antibiotics.  What if my symptoms do not get better? -- If your symptoms do not get better, talk with your doctor or nurse. They might order tests to figure out why you still have symptoms. These can include:  · CT scan or other imaging tests - Imaging tests create pictures of the inside of the body.  · A test to look inside the sinuses - For this test, a doctor puts a thin tube with a camera on the end into the nose and up into the sinuses.  Some people get a lot of sinus infections or have symptoms that last at least 3 months. These people can have a different type of sinusitis called "chronic sinusitis." Chronic sinusitis can be caused by different things. For example, some people have growths inside their nose or sinuses that are called "polyps." Other people have allergies that cause their symptoms.  Chronic sinusitis can be treated in different ways. If you have chronic sinusitis, talk with your doctor about which treatments are right for " you.  All topics are updated as new evidence becomes available and our peer review process is complete.  This topic retrieved from [employer] on: Sep 21, 2021.  Topic 97183 Version 17.0  Release: 29.4.2 - C29.263  © 2021 UpToDate, Inc. and/or its affiliates. All rights reserved.  figure 1: Sinuses of the face     This drawing shows the sinuses of the face, from the side and front views.  Graphic 390494 Version 1.0    Consumer Information Use and Disclaimer   This information is not specific medical advice and does not replace information you receive from your health care provider. This is only a brief summary of general information. It does NOT include all information about conditions, illnesses, injuries, tests, procedures, treatments, therapies, discharge instructions or life-style choices that may apply to you. You must talk with your health care provider for complete information about your health and treatment options. This information should not be used to decide whether or not to accept your health care provider's advice, instructions or recommendations. Only your health care provider has the knowledge and training to provide advice that is right for you. The use of this information is governed by the [employer] End User License Agreement, available at https://www.GoMore/en/solutions/Tie Society/about/meghan.The use of [employer] content is governed by the [employer] Terms of Use. ©2021 UpToDate, Inc. All rights reserved.  Copyright   © 2021 UpToDate, Inc. and/or its affiliates. All rights reserved.      TRY A NONSEDATING ANTIHISTAMINE SUCH AS ZYRTEC OR CLARITAN OR ALLEGRA OR XYZAL (OR GENERICS FOR THESE) DAILY AS NEEDED.     TRY USING FLONASE, 2 INHALATIONS EACH NOSTRIL 1-2 TIMES DAILY, AND ON A REGULAR BASIS DURING DIFFICULT TIMES TO ADDRESS YOUR ALLERGY SYMPTOMS.       Make sure that you follow up with your primary care doctor in the next 2-5 days if needed .  Return to the Urgent Care if signs or symptoms  change and certainly if you have worsening symptoms go to the nearest emergency department for further evaluation.

## 2022-02-23 ENCOUNTER — PATIENT MESSAGE (OUTPATIENT)
Dept: OBSTETRICS AND GYNECOLOGY | Facility: CLINIC | Age: 32
End: 2022-02-23
Payer: COMMERCIAL

## 2022-02-24 ENCOUNTER — PROCEDURE VISIT (OUTPATIENT)
Dept: OBSTETRICS AND GYNECOLOGY | Facility: CLINIC | Age: 32
End: 2022-02-24
Payer: COMMERCIAL

## 2022-02-24 VITALS
WEIGHT: 207.69 LBS | SYSTOLIC BLOOD PRESSURE: 132 MMHG | HEIGHT: 63 IN | DIASTOLIC BLOOD PRESSURE: 90 MMHG | BODY MASS INDEX: 36.8 KG/M2

## 2022-02-24 DIAGNOSIS — Z30.430 ENCOUNTER FOR IUD INSERTION: Primary | ICD-10-CM

## 2022-02-24 LAB
B-HCG UR QL: NEGATIVE
CTP QC/QA: YES

## 2022-02-24 PROCEDURE — 81025 POCT URINE PREGNANCY: ICD-10-PCS | Mod: S$GLB,,, | Performed by: OBSTETRICS & GYNECOLOGY

## 2022-02-24 PROCEDURE — 58300 INSERT INTRAUTERINE DEVICE: CPT | Mod: S$GLB,,, | Performed by: OBSTETRICS & GYNECOLOGY

## 2022-02-24 PROCEDURE — 99499 UNLISTED E&M SERVICE: CPT | Mod: S$GLB,,, | Performed by: OBSTETRICS & GYNECOLOGY

## 2022-02-24 PROCEDURE — 81025 URINE PREGNANCY TEST: CPT | Mod: S$GLB,,, | Performed by: OBSTETRICS & GYNECOLOGY

## 2022-02-24 PROCEDURE — 99499 NO LOS: ICD-10-PCS | Mod: S$GLB,,, | Performed by: OBSTETRICS & GYNECOLOGY

## 2022-02-24 PROCEDURE — 58300 INSERTION OF IUD: ICD-10-PCS | Mod: S$GLB,,, | Performed by: OBSTETRICS & GYNECOLOGY

## 2022-02-25 NOTE — PROCEDURES
Insertion of IUD    Date/Time: 2/24/2022 3:00 PM  Performed by: Candi James MD  Authorized by: Candi James MD     Consent:     Consent obtained:  Written    Consent given by:  Patient    Procedure risks and benefits discussed: yes      Patient questions answered: yes      Patient agrees, verbalizes understanding, and wants to proceed: yes      Educational handouts given: yes      Instructions and paperwork completed: yes    Procedure:     Pelvic exam performed: yes      Negative GC/chlamydia test: yes      Negative urine pregnancy test: yes      Cervix cleaned and prepped: yes      Speculum placed in vagina: yes      Tenaculum applied to cervix: yes      Uterus sounded: yes      Uterus sound depth (cm):  7    IUD inserted with no complications: yes      Strings trimmed: yes    1 Intra Uterine Device levonorgestreL 20 mcg/24 hours (7 yrs) 52 mg       Post-procedure:     Patient tolerated procedure well: yes      Patient will follow up after next period: yes

## 2022-03-24 ENCOUNTER — OFFICE VISIT (OUTPATIENT)
Dept: OBSTETRICS AND GYNECOLOGY | Facility: CLINIC | Age: 32
End: 2022-03-24
Payer: COMMERCIAL

## 2022-03-24 VITALS
HEIGHT: 63 IN | SYSTOLIC BLOOD PRESSURE: 124 MMHG | BODY MASS INDEX: 36.09 KG/M2 | DIASTOLIC BLOOD PRESSURE: 70 MMHG | WEIGHT: 203.69 LBS

## 2022-03-24 DIAGNOSIS — Z30.431 IUD CHECK UP: Primary | ICD-10-CM

## 2022-03-24 PROCEDURE — 1159F MED LIST DOCD IN RCRD: CPT | Mod: CPTII,S$GLB,, | Performed by: OBSTETRICS & GYNECOLOGY

## 2022-03-24 PROCEDURE — 99999 PR PBB SHADOW E&M-EST. PATIENT-LVL III: CPT | Mod: PBBFAC,,, | Performed by: OBSTETRICS & GYNECOLOGY

## 2022-03-24 PROCEDURE — 3078F PR MOST RECENT DIASTOLIC BLOOD PRESSURE < 80 MM HG: ICD-10-PCS | Mod: CPTII,S$GLB,, | Performed by: OBSTETRICS & GYNECOLOGY

## 2022-03-24 PROCEDURE — 99999 PR PBB SHADOW E&M-EST. PATIENT-LVL III: ICD-10-PCS | Mod: PBBFAC,,, | Performed by: OBSTETRICS & GYNECOLOGY

## 2022-03-24 PROCEDURE — 99213 OFFICE O/P EST LOW 20 MIN: CPT | Mod: S$GLB,,, | Performed by: OBSTETRICS & GYNECOLOGY

## 2022-03-24 PROCEDURE — 3008F PR BODY MASS INDEX (BMI) DOCUMENTED: ICD-10-PCS | Mod: CPTII,S$GLB,, | Performed by: OBSTETRICS & GYNECOLOGY

## 2022-03-24 PROCEDURE — 99213 PR OFFICE/OUTPT VISIT, EST, LEVL III, 20-29 MIN: ICD-10-PCS | Mod: S$GLB,,, | Performed by: OBSTETRICS & GYNECOLOGY

## 2022-03-24 PROCEDURE — 3074F SYST BP LT 130 MM HG: CPT | Mod: CPTII,S$GLB,, | Performed by: OBSTETRICS & GYNECOLOGY

## 2022-03-24 PROCEDURE — 3008F BODY MASS INDEX DOCD: CPT | Mod: CPTII,S$GLB,, | Performed by: OBSTETRICS & GYNECOLOGY

## 2022-03-24 PROCEDURE — 1159F PR MEDICATION LIST DOCUMENTED IN MEDICAL RECORD: ICD-10-PCS | Mod: CPTII,S$GLB,, | Performed by: OBSTETRICS & GYNECOLOGY

## 2022-03-24 PROCEDURE — 3078F DIAST BP <80 MM HG: CPT | Mod: CPTII,S$GLB,, | Performed by: OBSTETRICS & GYNECOLOGY

## 2022-03-24 PROCEDURE — 3074F PR MOST RECENT SYSTOLIC BLOOD PRESSURE < 130 MM HG: ICD-10-PCS | Mod: CPTII,S$GLB,, | Performed by: OBSTETRICS & GYNECOLOGY

## 2022-03-24 NOTE — PROGRESS NOTES
"    Chief Complaint: IUD String Check     HPI:      Geni Brownlee 31 y.o.   presents for follow up after IUD placement approximately 1 month ago. Today she reports no complaints. Has been sexually active with her partner without problems.  No LMP recorded.     ROS:     GENERAL: Denies unintentional weight gain or weight loss. Feeling well overall.   GYN: Denies change in discharge or vaginal bleeding.     Physical Exam:      Vitals:    22 1606   BP: 124/70   Weight: 92.4 kg (203 lb 11.3 oz)   Height: 5' 3" (1.6 m)         APPEARANCE: Well nourished, well developed, in no acute distress.  ABDOMEN: Soft.  No tenderness or masses.    PELVIC: Normal external genitalia without lesions.  Normal hair distribution.  Adequate perineal body, normal urethral meatus.  Vagina moist and well rugated without lesions or discharge.  Cervix pink, without lesions, discharge or tenderness. IUD strings visible at the cervical os. No significant cystocele or rectocele.  Bimanual exam shows uterus to be normal size, regular, mobile and nontender.  Adnexa without masses or tenderness.    EXTREMITIES: No edema.     Assessment/Plan:     IUD check up    RTC for annual.     Counseling:     Reviewed the length of IUD efficacy, in this case 7 years.   Reviewed barrier contraception to decrease risk of STDs.           Candi James MD  [facility]  2022    "

## 2022-03-30 DIAGNOSIS — L50.3 DERMATOGRAPHIC URTICARIA: ICD-10-CM

## 2022-03-31 RX ORDER — LEVOCETIRIZINE DIHYDROCHLORIDE 5 MG/1
TABLET, FILM COATED ORAL
Qty: 30 TABLET | Refills: 2 | Status: SHIPPED | OUTPATIENT
Start: 2022-03-31

## 2022-03-31 NOTE — TELEPHONE ENCOUNTER
This Rx Request does not qualify for assessment with the OR   Please review protocol details and the Care Due Message for extra clinical information    Reasons Rx Request may be deferred:  Patient has been seen in the ED/Hospital since the last PCP visit  Pt due for OV with PCP    Note composed:7:51 AM 03/31/2022

## 2022-04-15 ENCOUNTER — OFFICE VISIT (OUTPATIENT)
Dept: URGENT CARE | Facility: CLINIC | Age: 32
End: 2022-04-15
Payer: COMMERCIAL

## 2022-04-15 VITALS
DIASTOLIC BLOOD PRESSURE: 80 MMHG | OXYGEN SATURATION: 98 % | SYSTOLIC BLOOD PRESSURE: 132 MMHG | BODY MASS INDEX: 33.66 KG/M2 | HEIGHT: 63 IN | RESPIRATION RATE: 18 BRPM | TEMPERATURE: 98 F | HEART RATE: 89 BPM | WEIGHT: 190 LBS

## 2022-04-15 DIAGNOSIS — J30.2 CHRONIC SEASONAL ALLERGIC RHINITIS: ICD-10-CM

## 2022-04-15 DIAGNOSIS — Z20.822 CLOSE EXPOSURE TO COVID-19 VIRUS: ICD-10-CM

## 2022-04-15 DIAGNOSIS — K52.9 ACUTE GASTROENTERITIS: Primary | ICD-10-CM

## 2022-04-15 DIAGNOSIS — Z71.6 TOBACCO ABUSE COUNSELING: ICD-10-CM

## 2022-04-15 DIAGNOSIS — Z11.59 SCREENING FOR VIRAL DISEASE: ICD-10-CM

## 2022-04-15 LAB
CTP QC/QA: YES
CTP QC/QA: YES
POC MOLECULAR INFLUENZA A AGN: NEGATIVE
POC MOLECULAR INFLUENZA B AGN: NEGATIVE
SARS-COV-2 RDRP RESP QL NAA+PROBE: NEGATIVE

## 2022-04-15 PROCEDURE — 99214 PR OFFICE/OUTPT VISIT, EST, LEVL IV, 30-39 MIN: ICD-10-PCS | Mod: S$GLB,,, | Performed by: PHYSICIAN ASSISTANT

## 2022-04-15 PROCEDURE — 3075F PR MOST RECENT SYSTOLIC BLOOD PRESS GE 130-139MM HG: ICD-10-PCS | Mod: CPTII,S$GLB,, | Performed by: PHYSICIAN ASSISTANT

## 2022-04-15 PROCEDURE — 3079F PR MOST RECENT DIASTOLIC BLOOD PRESSURE 80-89 MM HG: ICD-10-PCS | Mod: CPTII,S$GLB,, | Performed by: PHYSICIAN ASSISTANT

## 2022-04-15 PROCEDURE — 87502 POCT INFLUENZA A/B MOLECULAR: ICD-10-PCS | Mod: QW,S$GLB,, | Performed by: PHYSICIAN ASSISTANT

## 2022-04-15 PROCEDURE — U0002 COVID-19 LAB TEST NON-CDC: HCPCS | Mod: QW,S$GLB,, | Performed by: PHYSICIAN ASSISTANT

## 2022-04-15 PROCEDURE — 3008F PR BODY MASS INDEX (BMI) DOCUMENTED: ICD-10-PCS | Mod: CPTII,S$GLB,, | Performed by: PHYSICIAN ASSISTANT

## 2022-04-15 PROCEDURE — 87502 INFLUENZA DNA AMP PROBE: CPT | Mod: QW,S$GLB,, | Performed by: PHYSICIAN ASSISTANT

## 2022-04-15 PROCEDURE — 3075F SYST BP GE 130 - 139MM HG: CPT | Mod: CPTII,S$GLB,, | Performed by: PHYSICIAN ASSISTANT

## 2022-04-15 PROCEDURE — 99214 OFFICE O/P EST MOD 30 MIN: CPT | Mod: S$GLB,,, | Performed by: PHYSICIAN ASSISTANT

## 2022-04-15 PROCEDURE — 3008F BODY MASS INDEX DOCD: CPT | Mod: CPTII,S$GLB,, | Performed by: PHYSICIAN ASSISTANT

## 2022-04-15 PROCEDURE — U0002: ICD-10-PCS | Mod: QW,S$GLB,, | Performed by: PHYSICIAN ASSISTANT

## 2022-04-15 PROCEDURE — 3079F DIAST BP 80-89 MM HG: CPT | Mod: CPTII,S$GLB,, | Performed by: PHYSICIAN ASSISTANT

## 2022-04-15 NOTE — PATIENT INSTRUCTIONS
PLEASE READ YOUR DISCHARGE INSTRUCTIONS ENTIRELY AS IT CONTAINS IMPORTANT INFORMATION.      -Use gatorade/pedialyte or rehydration packets to help stay hydrated. Vitamin water and plain water do not contain rehydrating electrolytes.  -Increase clear liquids (water, gatorade, pedialyte, broths, jello, etc) Hold off on solids for 12-18 hours. Then advance to BRAT diet (banana, rice, applesauce, tea, toast/crackers), then advance further as tolerated. Avoid spicy or fatty foods.     -Use Peptobismol or Immodium to help alleviate your diarrhea symptoms.  Take 1 dose and monitor to see if you can repeat AS IT WILL CAUSE CONSTIPATION.    -Avoid imodium unless you have more than 6 loose stools in 24 hours.     -May take Imitrol OTC as needed for nausea.     -Take mylanta or simethicone for bloating or gas pain.     -Take pepcid or omeprazole if you have heartburn or reflux sensation.      -Wash hands frequently while sick. Avoid ibuprofen or other NSAIDS until you are well.     -Please go to the ER if you experience worsening abdominal pain, blood in your vomit or stool, high fever, dizziness, fainting, swelling of your abdomen, inability to pass gas or stool, or inability to urinate.       -Please return or see your primary care doctor if you develop new or worsening symptoms in next 2-5 days.  Strict clinic versus ER precautions given.    Please arrange follow up with your primary medical clinic as soon as possible. You must understand that you've received an Urgent Care treatment only and that you may be released before all of your medical problems are known or treated. You, the patient, will arrange for follow up as instructed. If your symptoms worsen or fail to improve you should go to the Emergency Room.    WE CANNOT RULE OUT ALL POSSIBLE CAUSES OF YOUR SYMPTOMS IN THE URGENT CARE SETTING PLEASE GO TO THE ER IF YOU FEELS YOUR CONDITION IS WORSENING OR YOU WOULD LIKE EMERGENT EVALUATION.          Call 911  Call 911 if  any of these occur:  Trouble breathing  Confused  Very drowsy or trouble awakening  Fainting or loss of consciousness  Rapid heart rate  Chest pain  Seizure  Stiff neck  When to seek medical advice  Call your healthcare provider right away if any of these occur:  Increasing abdominal pain or constant lower right abdominal pain  Continued vomiting (unable to keep liquids down)  Diarrhea for more than 2 days in adults and 24 hours in children  Stools containing blood or pus or black tarry stools  Dark urine, reduced urine output  Weakness, dizziness  Drowsiness  Fever of 100.4°F (38.0°C), oral, or higher; or not better with fever medicine  New rash  If you are experiencing muscle weakness or arthritis symptoms during or after your gastroenteritis is gone

## 2022-04-15 NOTE — PROGRESS NOTES
"Subjective:       Patient ID: Geni Brownlee is a 31 y.o. female.    Vitals:  height is 5' 3" (1.6 m) and weight is 86.2 kg (190 lb). Her temperature is 98.1 °F (36.7 °C). Her blood pressure is 132/80 and her pulse is 89. Her respiration is 18 and oxygen saturation is 98%.     Chief Complaint: COVID-19 Concerns (Was in close contact with a person with Covid and not feeling well. - Entered by patient) and Fatigue      31-year-old female with history of depression and chronic seasonal allergies who presents to urgent care clinic for evaluation.  Complaining of symptoms that started 4 days ago with intermittent abdominal cramping pain, nonbloody diarrhea, nonbloody emesis, nausea, body aches, headache, and chills.  Diarrhea and vomiting have completely resolved for last 2 days after taking Pepto-Bismol.  Continues have nausea.  She is also requesting COVID testing done since her work partner tested positive for COVID yesterday and they were very closely together all week-long.  She is vaccinated.    Emesis   This is a new problem. The current episode started in the past 7 days (tuesday ). The problem occurs more than 10 times per day (Tuesday ). The problem has been resolved. There has been no fever. Associated symptoms include chills, diarrhea, headaches, myalgias and sweats. Pertinent negatives include no abdominal pain, arthralgias, chest pain, coughing, decreased urine volume, dizziness, fever, URI or weight loss. Associated symptoms comments: Nausea, light headiness, fatigue. She has tried nothing for the symptoms.       Constitution: Positive for chills and fatigue. Negative for activity change, appetite change, sweating, fever and generalized weakness.   HENT: Negative for ear pain, hearing loss, facial swelling, congestion, postnasal drip, sinus pain, sinus pressure, sore throat, trouble swallowing and voice change.    Neck: Negative for neck pain, neck stiffness and painful lymph nodes.   Cardiovascular: " Negative for chest pain, leg swelling, palpitations, sob on exertion and passing out.   Eyes: Negative for eye discharge, eye pain, photophobia, vision loss, double vision and blurred vision.   Respiratory: Negative for chest tightness, cough, sputum production, bloody sputum, COPD, shortness of breath, stridor, wheezing and asthma.    Gastrointestinal: Positive for nausea, vomiting and diarrhea. Negative for abdominal pain, constipation, bright red blood in stool, rectal bleeding, heartburn and bowel incontinence.   Genitourinary: Negative for dysuria, frequency, urgency, urine decreased, flank pain, bladder incontinence and hematuria.   Musculoskeletal: Positive for muscle ache. Negative for trauma, joint pain, joint swelling, abnormal ROM of joint and muscle cramps.   Skin: Negative for color change, pale, rash and wound.   Allergic/Immunologic: Positive for seasonal allergies. Negative for asthma and immunocompromised state.   Neurological: Positive for headaches. Negative for dizziness, history of vertigo, light-headedness, passing out, facial drooping, speech difficulty, coordination disturbances, loss of balance, disorientation, altered mental status, loss of consciousness, numbness, tingling and seizures.   Hematologic/Lymphatic: Negative for swollen lymph nodes, easy bruising/bleeding and trouble clotting. Does not bruise/bleed easily.   Psychiatric/Behavioral: Negative for altered mental status and disorientation.       Past Medical History:   Diagnosis Date    Depression        Objective:      Physical Exam   Constitutional: She is oriented to person, place, and time. She appears well-developed. She is cooperative.  Non-toxic appearance. She does not appear ill. No distress.   HENT:   Head: Normocephalic and atraumatic.   Ears:   Right Ear: Hearing, external ear and ear canal normal. No drainage, swelling or tenderness.   Left Ear: Hearing, external ear and ear canal normal. No drainage, swelling or  tenderness.   Nose: Nose normal. No rhinorrhea or purulent discharge. Right sinus exhibits no maxillary sinus tenderness and no frontal sinus tenderness. Left sinus exhibits no maxillary sinus tenderness and no frontal sinus tenderness.   Mouth/Throat: Uvula is midline, oropharynx is clear and moist and mucous membranes are normal. No oral lesions. No trismus in the jaw. No uvula swelling. No oropharyngeal exudate, posterior oropharyngeal edema or posterior oropharyngeal erythema. No tonsillar exudate.   Eyes: Conjunctivae, EOM and lids are normal. Pupils are equal, round, and reactive to light. No visual field deficit is present. Right eye exhibits no discharge. Left eye exhibits no discharge. Right conjunctiva is not injected. Right conjunctiva has no hemorrhage. Left conjunctiva is not injected. Left conjunctiva has no hemorrhage.      extraocular movement intact vision grossly intact gaze aligned appropriately   Neck: Neck supple. No neck rigidity present.   Cardiovascular: Normal rate, regular rhythm, normal heart sounds and normal pulses.   No murmur heard.  Pulmonary/Chest: Effort normal and breath sounds normal. No accessory muscle usage or stridor. No respiratory distress. She has no wheezes. She exhibits no tenderness.   Abdominal: Normal appearance. She exhibits no distension and no mass. Soft. There is no abdominal tenderness. There is no rebound, no guarding, no left CVA tenderness and no right CVA tenderness.   Musculoskeletal: Normal range of motion.         General: Normal range of motion.      Right lower leg: No edema.      Left lower leg: No edema.      Comments: Moves all extremities with normal tone, strength, and ROM.  Gait normal.   Lymphadenopathy:     She has no cervical adenopathy.   Neurological: no focal deficit. She is alert, oriented to person, place, and time and at baseline. She has normal motor skills, normal sensation and intact cranial nerves. She displays no weakness, facial  symmetry, normal reflexes and no dysarthria. No cranial nerve deficit or sensory deficit. She exhibits normal muscle tone. She has a normal Finger-Nose-Finger Test. Coordination: Heel to shin test normal. She shows no pronator drift. She displays no seizure activity. Gait and coordination normal. Coordination normal. GCS eye subscore is 4. GCS verbal subscore is 5. GCS motor subscore is 6.   Skin: Skin is warm, dry, not diaphoretic and no rash. Capillary refill takes less than 2 seconds.   Psychiatric: Her speech is normal and behavior is normal. Mood and thought content normal.   Nursing note and vitals reviewed.          Results for orders placed or performed in visit on 04/15/22   POCT COVID-19 Rapid Screening   Result Value Ref Range    POC Rapid COVID Negative Negative     Acceptable Yes    POCT Influenza A/B MOLECULAR   Result Value Ref Range    POC Molecular Influenza A Ag Negative Negative, Not Reported    POC Molecular Influenza B Ag Negative Negative, Not Reported     Acceptable Yes        Assessment:       1. Acute gastroenteritis    2. Chronic seasonal allergic rhinitis    3. Close exposure to COVID-19 virus    4. Screening for viral disease    5. Tobacco abuse counseling        Nontoxic appearing. Vitals are stable. Patient presents for COVID nasal swab testing. Patient is concerned for possible exposure. Rapid covid and flu negative.   All diagnostic testing personally reviewed and interpreted.   Patient has symptoms at this time which is consistent with above diagnosis.  No longer having vomiting or diarrhea.  Some intermittent nausea.  Offered prescription for Zofran but patient already has some from previous visits.      Patient was recommended OTC treatments for their symptoms.      Patient was counseled, explained with the test results meaning, expected course, and answered all of questions. They can also receive results via my chart.  Printed and verbal COVID  guidelines were given.   Recommend follow-up PCP in the next 2-3 days if new or worsening symptoms.    -The patient was counseled on the dangers of tobacco use, and was not ready to quit at this time.    Patient understands that they received an Urgent Care treatment only and that they may be released before all your medical problems are known or treated. Strict ED versus clinic precautions given.  Patient verbalized understanding and agreed with plan of care.    Note dictated with voice recognition software, please excuse any grammatical errors.    Plan:         Acute gastroenteritis    Chronic seasonal allergic rhinitis    Close exposure to COVID-19 virus    Screening for viral disease  -     POCT COVID-19 Rapid Screening  -     POCT Influenza A/B MOLECULAR    Tobacco abuse counseling              Additional MDM:     Heart Failure Score:   COPD = No    Patient Instructions   PLEASE READ YOUR DISCHARGE INSTRUCTIONS ENTIRELY AS IT CONTAINS IMPORTANT INFORMATION.      -Use gatorade/pedialyte or rehydration packets to help stay hydrated. Vitamin water and plain water do not contain rehydrating electrolytes.  -Increase clear liquids (water, gatorade, pedialyte, broths, jello, etc) Hold off on solids for 12-18 hours. Then advance to BRAT diet (banana, rice, applesauce, tea, toast/crackers), then advance further as tolerated. Avoid spicy or fatty foods.     -Use Peptobismol or Immodium to help alleviate your diarrhea symptoms.  Take 1 dose and monitor to see if you can repeat AS IT WILL CAUSE CONSTIPATION.    -Avoid imodium unless you have more than 6 loose stools in 24 hours.     -May take Imitrol OTC as needed for nausea.     -Take mylanta or simethicone for bloating or gas pain.     -Take pepcid or omeprazole if you have heartburn or reflux sensation.      -Wash hands frequently while sick. Avoid ibuprofen or other NSAIDS until you are well.     -Please go to the ER if you experience worsening abdominal pain, blood in  your vomit or stool, high fever, dizziness, fainting, swelling of your abdomen, inability to pass gas or stool, or inability to urinate.       -Please return or see your primary care doctor if you develop new or worsening symptoms in next 2-5 days.  Strict clinic versus ER precautions given.    Please arrange follow up with your primary medical clinic as soon as possible. You must understand that you've received an Urgent Care treatment only and that you may be released before all of your medical problems are known or treated. You, the patient, will arrange for follow up as instructed. If your symptoms worsen or fail to improve you should go to the Emergency Room.    WE CANNOT RULE OUT ALL POSSIBLE CAUSES OF YOUR SYMPTOMS IN THE URGENT CARE SETTING PLEASE GO TO THE ER IF YOU FEELS YOUR CONDITION IS WORSENING OR YOU WOULD LIKE EMERGENT EVALUATION.          Call 911  Call 911 if any of these occur:  · Trouble breathing  · Confused  · Very drowsy or trouble awakening  · Fainting or loss of consciousness  · Rapid heart rate  · Chest pain  · Seizure  · Stiff neck  When to seek medical advice  Call your healthcare provider right away if any of these occur:  · Increasing abdominal pain or constant lower right abdominal pain  · Continued vomiting (unable to keep liquids down)  · Diarrhea for more than 2 days in adults and 24 hours in children  · Stools containing blood or pus or black tarry stools  · Dark urine, reduced urine output  · Weakness, dizziness  · Drowsiness  · Fever of 100.4°F (38.0°C), oral, or higher; or not better with fever medicine  · New rash  · If you are experiencing muscle weakness or arthritis symptoms during or after your gastroenteritis is gone

## 2022-05-16 ENCOUNTER — OFFICE VISIT (OUTPATIENT)
Dept: URGENT CARE | Facility: CLINIC | Age: 32
End: 2022-05-16
Payer: COMMERCIAL

## 2022-05-16 VITALS
SYSTOLIC BLOOD PRESSURE: 129 MMHG | DIASTOLIC BLOOD PRESSURE: 71 MMHG | TEMPERATURE: 99 F | WEIGHT: 190 LBS | BODY MASS INDEX: 33.66 KG/M2 | OXYGEN SATURATION: 97 % | RESPIRATION RATE: 18 BRPM | HEART RATE: 105 BPM | HEIGHT: 63 IN

## 2022-05-16 DIAGNOSIS — U07.1 COVID-19: Primary | ICD-10-CM

## 2022-05-16 DIAGNOSIS — R09.81 SINUS CONGESTION: ICD-10-CM

## 2022-05-16 DIAGNOSIS — Z11.59 SCREENING FOR VIRAL DISEASE: ICD-10-CM

## 2022-05-16 LAB
CTP QC/QA: YES
SARS-COV-2 RDRP RESP QL NAA+PROBE: POSITIVE

## 2022-05-16 PROCEDURE — 3008F PR BODY MASS INDEX (BMI) DOCUMENTED: ICD-10-PCS | Mod: CPTII,S$GLB,, | Performed by: NURSE PRACTITIONER

## 2022-05-16 PROCEDURE — 3074F SYST BP LT 130 MM HG: CPT | Mod: CPTII,S$GLB,, | Performed by: NURSE PRACTITIONER

## 2022-05-16 PROCEDURE — 1160F PR REVIEW ALL MEDS BY PRESCRIBER/CLIN PHARMACIST DOCUMENTED: ICD-10-PCS | Mod: CPTII,S$GLB,, | Performed by: NURSE PRACTITIONER

## 2022-05-16 PROCEDURE — 3074F PR MOST RECENT SYSTOLIC BLOOD PRESSURE < 130 MM HG: ICD-10-PCS | Mod: CPTII,S$GLB,, | Performed by: NURSE PRACTITIONER

## 2022-05-16 PROCEDURE — 1160F RVW MEDS BY RX/DR IN RCRD: CPT | Mod: CPTII,S$GLB,, | Performed by: NURSE PRACTITIONER

## 2022-05-16 PROCEDURE — 3078F PR MOST RECENT DIASTOLIC BLOOD PRESSURE < 80 MM HG: ICD-10-PCS | Mod: CPTII,S$GLB,, | Performed by: NURSE PRACTITIONER

## 2022-05-16 PROCEDURE — U0002: ICD-10-PCS | Mod: QW,S$GLB,, | Performed by: NURSE PRACTITIONER

## 2022-05-16 PROCEDURE — 1159F PR MEDICATION LIST DOCUMENTED IN MEDICAL RECORD: ICD-10-PCS | Mod: CPTII,S$GLB,, | Performed by: NURSE PRACTITIONER

## 2022-05-16 PROCEDURE — 1159F MED LIST DOCD IN RCRD: CPT | Mod: CPTII,S$GLB,, | Performed by: NURSE PRACTITIONER

## 2022-05-16 PROCEDURE — 3008F BODY MASS INDEX DOCD: CPT | Mod: CPTII,S$GLB,, | Performed by: NURSE PRACTITIONER

## 2022-05-16 PROCEDURE — 3078F DIAST BP <80 MM HG: CPT | Mod: CPTII,S$GLB,, | Performed by: NURSE PRACTITIONER

## 2022-05-16 PROCEDURE — 99213 PR OFFICE/OUTPT VISIT, EST, LEVL III, 20-29 MIN: ICD-10-PCS | Mod: S$GLB,,, | Performed by: NURSE PRACTITIONER

## 2022-05-16 PROCEDURE — U0002 COVID-19 LAB TEST NON-CDC: HCPCS | Mod: QW,S$GLB,, | Performed by: NURSE PRACTITIONER

## 2022-05-16 PROCEDURE — 99213 OFFICE O/P EST LOW 20 MIN: CPT | Mod: S$GLB,,, | Performed by: NURSE PRACTITIONER

## 2022-05-16 RX ORDER — FLUTICASONE PROPIONATE 50 MCG
1 SPRAY, SUSPENSION (ML) NASAL DAILY
Qty: 18.2 ML | Refills: 0 | Status: SHIPPED | OUTPATIENT
Start: 2022-05-16 | End: 2022-05-23

## 2022-05-16 NOTE — PROGRESS NOTES
"Subjective:       Patient ID: Geni Brownlee is a 31 y.o. female.    Vitals:  height is 5' 3" (1.6 m) and weight is 86.2 kg (190 lb). Her temperature is 98.7 °F (37.1 °C). Her blood pressure is 129/71 and her pulse is 105. Her respiration is 18 and oxygen saturation is 97%.     Chief Complaint: Cough    Pt has c/o cough w/ sputum production, fever (102-oral at home), fatigue, headache, sinus pressure, sore throat, and post-nasal drip. Pt taken theraflu for sx and had 2 positive at home covid tests, need documented positive for work.     Cough  This is a new problem. The current episode started in the past 7 days. The problem has been unchanged. The cough is productive of sputum. Associated symptoms include a fever, headaches, postnasal drip, rhinorrhea and a sore throat. Pertinent negatives include no chest pain, chills, ear congestion, ear pain, heartburn, hemoptysis, myalgias, nasal congestion, rash, shortness of breath, sweats, weight loss or wheezing. Treatments tried: Theraflu        Constitution: Positive for fever. Negative for chills.   HENT: Positive for postnasal drip and sore throat. Negative for ear pain.    Cardiovascular: Negative for chest pain.   Respiratory: Positive for cough. Negative for bloody sputum, shortness of breath and wheezing.    Gastrointestinal: Negative for heartburn.   Musculoskeletal: Negative for muscle ache.   Skin: Negative for rash.   Neurological: Positive for headaches.       Objective:      Physical Exam   Constitutional: She is oriented to person, place, and time.   HENT:   Head: Normocephalic and atraumatic.   Cardiovascular: Normal rate.   Pulmonary/Chest: Effort normal. No respiratory distress.   Abdominal: Normal appearance.   Neurological: She is alert and oriented to person, place, and time.   Skin: Skin is warm and dry.   Psychiatric: Her behavior is normal. Mood normal.           Results for orders placed or performed in visit on 05/16/22   POCT COVID-19 Rapid " Screening   Result Value Ref Range    POC Rapid COVID Positive (A) Negative     Acceptable Yes      Assessment:       1. COVID-19    2. Screening for viral disease    3. Sinus congestion          Plan:       Covid risk score    1      COVID-19  -     fluticasone propionate (FLONASE) 50 mcg/actuation nasal spray; 1 spray (50 mcg total) by Each Nostril route once daily. for 7 days  Dispense: 18.2 mL; Refill: 0    Screening for viral disease  -     POCT COVID-19 Rapid Screening    Sinus congestion  -     fluticasone propionate (FLONASE) 50 mcg/actuation nasal spray; 1 spray (50 mcg total) by Each Nostril route once daily. for 7 days  Dispense: 18.2 mL; Refill: 0

## 2022-05-26 ENCOUNTER — OFFICE VISIT (OUTPATIENT)
Dept: ORTHOPEDICS | Facility: CLINIC | Age: 32
End: 2022-05-26
Attending: ORTHOPAEDIC SURGERY
Payer: COMMERCIAL

## 2022-05-26 VITALS
SYSTOLIC BLOOD PRESSURE: 120 MMHG | DIASTOLIC BLOOD PRESSURE: 70 MMHG | HEART RATE: 78 BPM | HEIGHT: 63 IN | WEIGHT: 190 LBS | RESPIRATION RATE: 15 BRPM | BODY MASS INDEX: 33.66 KG/M2 | OXYGEN SATURATION: 99 %

## 2022-05-26 DIAGNOSIS — S83.281A TEAR OF LATERAL MENISCUS OF RIGHT KNEE, CURRENT, UNSPECIFIED TEAR TYPE, INITIAL ENCOUNTER: Primary | ICD-10-CM

## 2022-05-26 DIAGNOSIS — S89.90XA KNEE INJURY, UNSPECIFIED LATERALITY, INITIAL ENCOUNTER: Primary | ICD-10-CM

## 2022-05-26 PROCEDURE — 3078F PR MOST RECENT DIASTOLIC BLOOD PRESSURE < 80 MM HG: ICD-10-PCS | Mod: CPTII,S$GLB,, | Performed by: ORTHOPAEDIC SURGERY

## 2022-05-26 PROCEDURE — 3008F BODY MASS INDEX DOCD: CPT | Mod: CPTII,S$GLB,, | Performed by: ORTHOPAEDIC SURGERY

## 2022-05-26 PROCEDURE — 3074F PR MOST RECENT SYSTOLIC BLOOD PRESSURE < 130 MM HG: ICD-10-PCS | Mod: CPTII,S$GLB,, | Performed by: ORTHOPAEDIC SURGERY

## 2022-05-26 PROCEDURE — 3074F SYST BP LT 130 MM HG: CPT | Mod: CPTII,S$GLB,, | Performed by: ORTHOPAEDIC SURGERY

## 2022-05-26 PROCEDURE — 99999 PR PBB SHADOW E&M-EST. PATIENT-LVL III: CPT | Mod: PBBFAC,,, | Performed by: ORTHOPAEDIC SURGERY

## 2022-05-26 PROCEDURE — 3008F PR BODY MASS INDEX (BMI) DOCUMENTED: ICD-10-PCS | Mod: CPTII,S$GLB,, | Performed by: ORTHOPAEDIC SURGERY

## 2022-05-26 PROCEDURE — 99999 PR PBB SHADOW E&M-EST. PATIENT-LVL III: ICD-10-PCS | Mod: PBBFAC,,, | Performed by: ORTHOPAEDIC SURGERY

## 2022-05-26 PROCEDURE — 3078F DIAST BP <80 MM HG: CPT | Mod: CPTII,S$GLB,, | Performed by: ORTHOPAEDIC SURGERY

## 2022-05-26 PROCEDURE — 99203 OFFICE O/P NEW LOW 30 MIN: CPT | Mod: S$GLB,,, | Performed by: ORTHOPAEDIC SURGERY

## 2022-05-26 PROCEDURE — 99203 PR OFFICE/OUTPT VISIT, NEW, LEVL III, 30-44 MIN: ICD-10-PCS | Mod: S$GLB,,, | Performed by: ORTHOPAEDIC SURGERY

## 2022-05-26 RX ORDER — HYDROCODONE BITARTRATE AND ACETAMINOPHEN 10; 325 MG/1; MG/1
1 TABLET ORAL
COMMUNITY

## 2022-05-26 RX ORDER — METHYLPREDNISOLONE 4 MG/1
TABLET ORAL
Qty: 21 EACH | Refills: 0 | Status: SHIPPED | OUTPATIENT
Start: 2022-05-26 | End: 2022-06-16

## 2022-05-26 RX ORDER — CELECOXIB 100 MG/1
100 CAPSULE ORAL
COMMUNITY
End: 2023-11-07

## 2022-05-26 NOTE — PROGRESS NOTES
NEW PATIENT ORTHOPAEDIC: Knee    PRIMARY CARE PHYSICIAN: Primary Doctor No   REFERRING PROVIDER: Tom Oglesby MD  [address] [facility]  [place] [id number]     ASSESSMENT & PLAN:    Impression:  Right Knee Lateral meniscal tear     Follow Up Plan: PRN     Non operative care:    Geni Brownlee has physical exam evidence of above and wishes to pursue an non-operative care. I am recommending the following: medrol dose pack.  Patient with a 1 week history of acute right knee pain this was associated with an episode where she was in oak flexed knee position petting a dog and felt a strain in her knee.  She does not recall sensation of any popping however does have clicking and catching after this event.  She was unable to place significant amount of weight on her leg and noticed some delayed swelling.  She presented to emergency room where radiographs were negative and she was given Celebrex and asked to follow-up with orthopedics.  Today she reports that she is now able to the ambulate on her leg.  Not using any braces or crutches.  Still notice some intermittent swelling but this is primarily after being on her feet most of the day.  She has been icing and elevating and compressing.  At this point things seem to be improving her story and exam seem to be consistent with a possible meniscus tear.  For this I am just recommending Medrol Dosepak for the residual swelling and pain.  Should this fail to alleviate her pain in the next 2 weeks could consider intra-articular injection versus need for an MRI depending on her symptoms and severity. Work note provided today.     The patient has been ordered:  Pain Prescription    CONSULTS:   None    ACTIVE PROBLEM LIST  There is no problem list on file for this patient.     SUBJECTIVE    CHIEF COMPLAINT: Knee Pain    HPI:   Geni Brownlee is a 31 y.o. female here for evaluation and management of right knee pain. There is a specific incident that brought about this pain. she has  had progressive problems with the knee(s) starting 1 weeks ago but is now progressing to interfere with activities which include: enjoying hobbies and standing for prolonged periods of time    Currently the pain in the joint is rated at moderate with activity. The pain is constant and is located in the knee, at level of joint line and located laterally and anterior The pain is described as aching, sharp, stiffness and throbbing. Relieving factors include rest, ice or heat and prescription medication.     There is associated Clicking.     Geni Brownlee has no additional complaints.     PROGRESSIVE SYMPTOMS:  Pain impacting work  Pain worsened by weight bearing    FUNCTIONAL STATUS:   Participate in recreational activities     PREVIOUS TREATMENTS:  Medical: RX NSAIDS and Narcotics  Physical Therapy: Activities Modified   Previous Orthopaedic Surgery: None    REVIEW OF SYSTEMS:  PAIN ASSESSMENT:  See HPI.  MUSCULOSKELETAL: See HPI.  OTHER 10 point review of systems is negative except as stated in HPI above    PAST MEDICAL HISTORY   has a past medical history of Depression.     PAST SURGICAL HISTORY   has no past surgical history on file.     FAMILY HISTORY  family history includes Breast cancer in her maternal aunt; No Known Problems in her father and mother.     SOCIAL HISTORY   reports that she has been smoking cigarettes. She has been smoking about 0.50 packs per day. She has never used smokeless tobacco. She reports current alcohol use. She reports that she does not use drugs.     ALLERGIES   Review of patient's allergies indicates:   Allergen Reactions    Fish oil Itching     Allergic to seafood        MEDICATIONS  Current Outpatient Medications on File Prior to Visit   Medication Sig Dispense Refill    levocetirizine (XYZAL) 5 MG tablet TAKE 1 TABLET(5 MG) BY MOUTH EVERY EVENING 30 tablet 2    paroxetine (PAXIL) 10 MG tablet Take 1 tablet (10 mg total) by mouth once daily. 30 tablet 2     Current  Facility-Administered Medications on File Prior to Visit   Medication Dose Route Frequency Provider Last Rate Last Admin    levonorgestreL (MIRENA) 20 mcg/24 hours (7 yrs) 52 mg IUD 1 Intra Uterine Device  1 Intra Uterine Device Intrauterine  Candi James MD   1 Intra Uterine Device at 02/24/22 1500          PHYSICAL EXAM   vitals were not taken for this visit.   There is no height or weight on file to calculate BMI.      All other systems deferred.  GENERAL:  No acute distress  HABITUS: Obese  GAIT: Non-antalgic  SKIN: Normal     KNEE EXAM:    right:   Effusion: Small joint effusion  TTP: yes over Lateral Joint Line and biceps  no crepitus with passive knee ROM  Passive ROM: Extension 0, Flexion 130  No pain with manipulation of patella  Stable to varus/valgus stress. No increased laxity to anterior/posterior drawer testing  negative Viet's test  No pain with IR/ER rotation of the hip  5/5 strength in knee flexion and extension, ankle plantarflexion and dorsiflexion  Neurovascular Status: Sensation intact to light touch in Sural, Saphenous, SPN, DPN, Tibial nerve distribution  2+ pulse DP/PT, normal capillary refill, foot has normal warmth    DATA:  Diagnostic tests reviewed for today's visit:     The obtained knee radiographs appears normal for age. There is good alignment with no evidence of fracture, bony abnormalities or signficant degenerative changes.

## 2022-11-07 ENCOUNTER — OFFICE VISIT (OUTPATIENT)
Dept: ORTHOPEDICS | Facility: CLINIC | Age: 32
End: 2022-11-07
Payer: COMMERCIAL

## 2022-11-07 VITALS — BODY MASS INDEX: 33.66 KG/M2 | HEIGHT: 63 IN | WEIGHT: 190 LBS

## 2022-11-07 DIAGNOSIS — S83.281A TEAR OF LATERAL MENISCUS OF RIGHT KNEE, CURRENT, UNSPECIFIED TEAR TYPE, INITIAL ENCOUNTER: Primary | ICD-10-CM

## 2022-11-07 PROCEDURE — 3008F PR BODY MASS INDEX (BMI) DOCUMENTED: ICD-10-PCS | Mod: CPTII,S$GLB,, | Performed by: ORTHOPAEDIC SURGERY

## 2022-11-07 PROCEDURE — 99999 PR PBB SHADOW E&M-EST. PATIENT-LVL III: ICD-10-PCS | Mod: PBBFAC,,, | Performed by: ORTHOPAEDIC SURGERY

## 2022-11-07 PROCEDURE — 20610 DRAIN/INJ JOINT/BURSA W/O US: CPT | Mod: RT,S$GLB,, | Performed by: ORTHOPAEDIC SURGERY

## 2022-11-07 PROCEDURE — 3008F BODY MASS INDEX DOCD: CPT | Mod: CPTII,S$GLB,, | Performed by: ORTHOPAEDIC SURGERY

## 2022-11-07 PROCEDURE — 99999 PR PBB SHADOW E&M-EST. PATIENT-LVL III: CPT | Mod: PBBFAC,,, | Performed by: ORTHOPAEDIC SURGERY

## 2022-11-07 PROCEDURE — 1159F PR MEDICATION LIST DOCUMENTED IN MEDICAL RECORD: ICD-10-PCS | Mod: CPTII,S$GLB,, | Performed by: ORTHOPAEDIC SURGERY

## 2022-11-07 PROCEDURE — 99214 PR OFFICE/OUTPT VISIT, EST, LEVL IV, 30-39 MIN: ICD-10-PCS | Mod: 25,S$GLB,, | Performed by: ORTHOPAEDIC SURGERY

## 2022-11-07 PROCEDURE — 20610 LARGE JOINT ASPIRATION/INJECTION: R KNEE: ICD-10-PCS | Mod: RT,S$GLB,, | Performed by: ORTHOPAEDIC SURGERY

## 2022-11-07 PROCEDURE — 99214 OFFICE O/P EST MOD 30 MIN: CPT | Mod: 25,S$GLB,, | Performed by: ORTHOPAEDIC SURGERY

## 2022-11-07 PROCEDURE — 1159F MED LIST DOCD IN RCRD: CPT | Mod: CPTII,S$GLB,, | Performed by: ORTHOPAEDIC SURGERY

## 2022-11-07 RX ORDER — TRIAMCINOLONE ACETONIDE 40 MG/ML
40 INJECTION, SUSPENSION INTRA-ARTICULAR; INTRAMUSCULAR
Status: DISCONTINUED | OUTPATIENT
Start: 2022-11-07 | End: 2022-11-07 | Stop reason: HOSPADM

## 2022-11-07 RX ADMIN — TRIAMCINOLONE ACETONIDE 40 MG: 40 INJECTION, SUSPENSION INTRA-ARTICULAR; INTRAMUSCULAR at 07:11

## 2022-11-07 NOTE — PROGRESS NOTES
EST PATIENT ORTHOPAEDIC: Knee    PRIMARY CARE PHYSICIAN: Primary Doctor No   REFERRING PROVIDER: No referring provider defined for this encounter.     ASSESSMENT & PLAN:    Impression:  Right Knee Lateral meniscal tear     Follow Up Plan: Will Call with MR results.     Non operative care:    Geni Brownlee has physical exam evidence of above and wishes to pursue an non-operative care. I am recommending the following: injection and MRI.       To review: Patient with a 5 month history of right knee pain this was associated with an episode where she was in flexed knee position petting a dog and felt a strain in her knee.  She does not recall sensation of any popping however does have clicking and catching after this event.  She was unable to place significant amount of weight on her leg and noticed some delayed swelling.  She presented to emergency room where radiographs were negative and she was given Celebrex and asked to follow-up with orthopedics.  Previously when I saw her, she reports that she is now able to the ambulate on her leg.  Not using any braces or crutches.  Still notice some intermittent swelling but this is primarily after being on her feet most of the day.  She has been icing and elevating and compressing. We tried a Medrol Dosepak for the residual swelling and pain. She has had more episodes of the same pain over the last few months. She is back on crutches today for pain. Will try injection and get MR to confirm meniscal tear for consideration of arthroscopic intervention.      The patient has been ordered:  Injection    CONSULTS:   None    ACTIVE PROBLEM LIST  There is no problem list on file for this patient.     SUBJECTIVE    CHIEF COMPLAINT: Knee Pain    HPI:   Geni Brownlee is a 31 y.o. female here for evaluation and management of right knee pain. There is a specific incident that brought about this pain. she has had progressive problems with the knee(s) starting 1 weeks ago but is now  progressing to interfere with activities which include: enjoying hobbies and standing for prolonged periods of time    Currently the pain in the joint is rated at moderate with activity. The pain is constant and is located in the knee, at level of joint line and located laterally and anterior The pain is described as aching, sharp, stiffness and throbbing. Relieving factors include rest, ice or heat and prescription medication.     There is associated Clicking.     Geni Brownlee has no additional complaints.     11/7/22; Here for follow up. On going knee pains now chronic. Multiple events with increased swelling, inability to weight bear, needing crutches. Previous Medrol dose pack without long term relief.     PROGRESSIVE SYMPTOMS:  Pain impacting work  Pain worsened by weight bearing    FUNCTIONAL STATUS:   Participate in recreational activities     PREVIOUS TREATMENTS:  Medical: RX NSAIDS and Narcotics  Physical Therapy: Activities Modified   Previous Orthopaedic Surgery: None    REVIEW OF SYSTEMS:  PAIN ASSESSMENT:  See HPI.  MUSCULOSKELETAL: See HPI.  OTHER 10 point review of systems is negative except as stated in HPI above    PAST MEDICAL HISTORY   has a past medical history of Depression.     PAST SURGICAL HISTORY   has no past surgical history on file.     FAMILY HISTORY  family history includes Breast cancer in her maternal aunt; No Known Problems in her father and mother.     SOCIAL HISTORY   reports that she has been smoking cigarettes. She has been smoking an average of .5 packs per day. She has never used smokeless tobacco. She reports current alcohol use. She reports that she does not use drugs.     ALLERGIES   Review of patient's allergies indicates:   Allergen Reactions    Fish oil Itching     Allergic to seafood        MEDICATIONS  Current Outpatient Medications on File Prior to Visit   Medication Sig Dispense Refill    paroxetine (PAXIL) 10 MG tablet Take 1 tablet (10 mg total) by mouth once  "daily. 30 tablet 2    celecoxib (CELEBREX) 100 MG capsule Take 100 mg by mouth.      HYDROcodone-acetaminophen (NORCO)  mg per tablet Take 1 tablet by mouth.      levocetirizine (XYZAL) 5 MG tablet TAKE 1 TABLET(5 MG) BY MOUTH EVERY EVENING (Patient not taking: Reported on 11/7/2022) 30 tablet 2     Current Facility-Administered Medications on File Prior to Visit   Medication Dose Route Frequency Provider Last Rate Last Admin    levonorgestreL (MIRENA) 20 mcg/24 hours (7 yrs) 52 mg IUD 1 Intra Uterine Device  1 Intra Uterine Device Intrauterine  Candi James MD   1 Intra Uterine Device at 02/24/22 1500          PHYSICAL EXAM   height is 5' 3" (1.6 m) and weight is 86.2 kg (190 lb).   Body mass index is 33.66 kg/m².      All other systems deferred.  GENERAL:  No acute distress  HABITUS: Obese  GAIT: Non-antalgic  SKIN: Normal     KNEE EXAM:    right:   Effusion: Small joint effusion  TTP: yes over Lateral Joint Line and biceps  no crepitus with passive knee ROM  Passive ROM: Extension 0, Flexion 130  No pain with manipulation of patella  Stable to varus/valgus stress. No increased laxity to anterior/posterior drawer testing  negative Viet's test  No pain with IR/ER rotation of the hip  5/5 strength in knee flexion and extension, ankle plantarflexion and dorsiflexion  Neurovascular Status: Sensation intact to light touch in Sural, Saphenous, SPN, DPN, Tibial nerve distribution  2+ pulse DP/PT, normal capillary refill, foot has normal warmth    DATA:  Diagnostic tests reviewed for today's visit:     The obtained knee radiographs appears normal for age. There is good alignment with no evidence of fracture, bony abnormalities or signficant degenerative changes.      "

## 2022-11-07 NOTE — PROCEDURES
Large Joint Aspiration/Injection: R knee    Date/Time: 11/7/2022 7:20 AM  Performed by: Tom Oglesby MD  Authorized by: Tom Oglesby MD     Consent Done?:  Yes (Verbal)  Indications:  Pain and joint swelling  Site marked: the procedure site was marked    Timeout: prior to procedure the correct patient, procedure, and site was verified    Prep: patient was prepped and draped in usual sterile fashion      Local anesthesia used?: Yes    Anesthesia:  Local infiltration  Local anesthetic:  Lidocaine 1% without epinephrine and topical anesthetic    Details:  Needle Size:  22 G  Approach:  Anterolateral  Location:  Knee  Site:  R knee  Medications:  40 mg triamcinolone acetonide 40 mg/mL  Patient tolerance:  Patient tolerated the procedure well with no immediate complications

## 2022-11-11 ENCOUNTER — TELEPHONE (OUTPATIENT)
Dept: ORTHOPEDICS | Facility: CLINIC | Age: 32
End: 2022-11-11
Payer: COMMERCIAL

## 2022-11-11 NOTE — TELEPHONE ENCOUNTER
----- Message from Marysol Frankel sent at 11/11/2022  3:55 PM CST -----  Name of Who is Calling: JIMMY PELAEZ [[id number]]            What is the request in detail: Patient is requesting call back to get MRI order faxed to [facility] so insurance will pay      #:[phone]

## 2022-11-17 ENCOUNTER — PATIENT MESSAGE (OUTPATIENT)
Dept: ORTHOPEDICS | Facility: CLINIC | Age: 32
End: 2022-11-17
Payer: COMMERCIAL

## 2022-11-17 RX ORDER — METHYLPREDNISOLONE 4 MG/1
TABLET ORAL
Qty: 21 EACH | Refills: 0 | Status: SHIPPED | OUTPATIENT
Start: 2022-11-17 | End: 2022-12-08

## 2022-11-17 NOTE — TELEPHONE ENCOUNTER
Would you want to refill her a MDP? She says in may when you gave her one it helped, she is battling with her insurance for her MRI. If not I can schedule her to come back in to see you.

## 2022-11-18 ENCOUNTER — PATIENT MESSAGE (OUTPATIENT)
Dept: ORTHOPEDICS | Facility: CLINIC | Age: 32
End: 2022-11-18
Payer: COMMERCIAL

## 2022-11-29 ENCOUNTER — PATIENT MESSAGE (OUTPATIENT)
Dept: ORTHOPEDICS | Facility: CLINIC | Age: 32
End: 2022-11-29
Payer: COMMERCIAL

## 2023-11-07 ENCOUNTER — OFFICE VISIT (OUTPATIENT)
Dept: FAMILY MEDICINE | Facility: CLINIC | Age: 33
End: 2023-11-07
Payer: COMMERCIAL

## 2023-11-07 VITALS
SYSTOLIC BLOOD PRESSURE: 124 MMHG | RESPIRATION RATE: 15 BRPM | WEIGHT: 178.81 LBS | TEMPERATURE: 98 F | HEART RATE: 102 BPM | HEIGHT: 64 IN | DIASTOLIC BLOOD PRESSURE: 72 MMHG | OXYGEN SATURATION: 97 % | BODY MASS INDEX: 30.53 KG/M2

## 2023-11-07 DIAGNOSIS — R63.1 POLYDIPSIA: ICD-10-CM

## 2023-11-07 DIAGNOSIS — Z00.00 ANNUAL PHYSICAL EXAM: Primary | ICD-10-CM

## 2023-11-07 DIAGNOSIS — R35.89 POLYURIA: ICD-10-CM

## 2023-11-07 PROCEDURE — 99999 PR PBB SHADOW E&M-EST. PATIENT-LVL IV: CPT | Mod: PBBFAC,,, | Performed by: STUDENT IN AN ORGANIZED HEALTH CARE EDUCATION/TRAINING PROGRAM

## 2023-11-07 PROCEDURE — 81514 NFCT DS BV&VAGINITIS DNA ALG: CPT | Performed by: STUDENT IN AN ORGANIZED HEALTH CARE EDUCATION/TRAINING PROGRAM

## 2023-11-07 PROCEDURE — 99999 PR PBB SHADOW E&M-EST. PATIENT-LVL IV: ICD-10-PCS | Mod: PBBFAC,,, | Performed by: STUDENT IN AN ORGANIZED HEALTH CARE EDUCATION/TRAINING PROGRAM

## 2023-11-07 PROCEDURE — 1159F PR MEDICATION LIST DOCUMENTED IN MEDICAL RECORD: ICD-10-PCS | Mod: CPTII,S$GLB,, | Performed by: STUDENT IN AN ORGANIZED HEALTH CARE EDUCATION/TRAINING PROGRAM

## 2023-11-07 PROCEDURE — 3078F PR MOST RECENT DIASTOLIC BLOOD PRESSURE < 80 MM HG: ICD-10-PCS | Mod: CPTII,S$GLB,, | Performed by: STUDENT IN AN ORGANIZED HEALTH CARE EDUCATION/TRAINING PROGRAM

## 2023-11-07 PROCEDURE — 3074F SYST BP LT 130 MM HG: CPT | Mod: CPTII,S$GLB,, | Performed by: STUDENT IN AN ORGANIZED HEALTH CARE EDUCATION/TRAINING PROGRAM

## 2023-11-07 PROCEDURE — 3078F DIAST BP <80 MM HG: CPT | Mod: CPTII,S$GLB,, | Performed by: STUDENT IN AN ORGANIZED HEALTH CARE EDUCATION/TRAINING PROGRAM

## 2023-11-07 PROCEDURE — 3008F PR BODY MASS INDEX (BMI) DOCUMENTED: ICD-10-PCS | Mod: CPTII,S$GLB,, | Performed by: STUDENT IN AN ORGANIZED HEALTH CARE EDUCATION/TRAINING PROGRAM

## 2023-11-07 PROCEDURE — 99214 OFFICE O/P EST MOD 30 MIN: CPT | Mod: S$GLB,,, | Performed by: STUDENT IN AN ORGANIZED HEALTH CARE EDUCATION/TRAINING PROGRAM

## 2023-11-07 PROCEDURE — 1159F MED LIST DOCD IN RCRD: CPT | Mod: CPTII,S$GLB,, | Performed by: STUDENT IN AN ORGANIZED HEALTH CARE EDUCATION/TRAINING PROGRAM

## 2023-11-07 PROCEDURE — 3008F BODY MASS INDEX DOCD: CPT | Mod: CPTII,S$GLB,, | Performed by: STUDENT IN AN ORGANIZED HEALTH CARE EDUCATION/TRAINING PROGRAM

## 2023-11-07 PROCEDURE — 3074F PR MOST RECENT SYSTOLIC BLOOD PRESSURE < 130 MM HG: ICD-10-PCS | Mod: CPTII,S$GLB,, | Performed by: STUDENT IN AN ORGANIZED HEALTH CARE EDUCATION/TRAINING PROGRAM

## 2023-11-07 PROCEDURE — 99214 PR OFFICE/OUTPT VISIT, EST, LEVL IV, 30-39 MIN: ICD-10-PCS | Mod: S$GLB,,, | Performed by: STUDENT IN AN ORGANIZED HEALTH CARE EDUCATION/TRAINING PROGRAM

## 2023-11-07 PROCEDURE — 1160F RVW MEDS BY RX/DR IN RCRD: CPT | Mod: CPTII,S$GLB,, | Performed by: STUDENT IN AN ORGANIZED HEALTH CARE EDUCATION/TRAINING PROGRAM

## 2023-11-07 PROCEDURE — 1160F PR REVIEW ALL MEDS BY PRESCRIBER/CLIN PHARMACIST DOCUMENTED: ICD-10-PCS | Mod: CPTII,S$GLB,, | Performed by: STUDENT IN AN ORGANIZED HEALTH CARE EDUCATION/TRAINING PROGRAM

## 2023-11-07 RX ORDER — NITROFURANTOIN 25; 75 MG/1; MG/1
100 CAPSULE ORAL 2 TIMES DAILY
COMMUNITY
Start: 2023-11-02 | End: 2023-12-22

## 2023-11-07 NOTE — PROGRESS NOTES
SUBJECTIVE     Chief Complaint   Patient presents with    Dysuria    Blood Sugar Problem       HPI  Geni Brownlee is a 32 y.o. female with no significant past medical history that presents for evaluation of polyuria.    Pt c/o polyuria and polydipsia, worsening over the past week. Took abx for UTI? and had no relief whatsoever. Denies dysuria, back pain, vaginal discharge. Getting a lot of muscle spasms.    Lives on [address].    PAST MEDICAL HISTORY:  Past Medical History:   Diagnosis Date    Depression        ALLERGIES AND MEDICATIONS: updated and reviewed.  Review of patient's allergies indicates:   Allergen Reactions    Fish oil Itching     Allergic to seafood     Current Outpatient Medications   Medication Sig Dispense Refill    paroxetine (PAXIL) 10 MG tablet Take 1 tablet (10 mg total) by mouth once daily. 30 tablet 2    HYDROcodone-acetaminophen (NORCO)  mg per tablet Take 1 tablet by mouth.      levocetirizine (XYZAL) 5 MG tablet TAKE 1 TABLET(5 MG) BY MOUTH EVERY EVENING (Patient not taking: Reported on 11/7/2022) 30 tablet 2    nitrofurantoin, macrocrystal-monohydrate, (MACROBID) 100 MG capsule Take 100 mg by mouth 2 (two) times daily.       Current Facility-Administered Medications   Medication Dose Route Frequency Provider Last Rate Last Admin    levonorgestreL (MIRENA) 20 mcg/24 hours (7 yrs) 52 mg IUD 1 Intra Uterine Device  1 Intra Uterine Device Intrauterine  Candi James MD   1 Intra Uterine Device at 02/24/22 1500       ROS  Review of Systems   Constitutional:  Negative for chills, fatigue and fever.   HENT:  Negative for rhinorrhea and sore throat.    Respiratory:  Negative for cough and shortness of breath.    Cardiovascular:  Negative for chest pain and palpitations.   Gastrointestinal:  Negative for constipation, diarrhea, nausea and vomiting.   Endocrine: Positive for polydipsia and polyuria.   Genitourinary:  Negative for dysuria.   Musculoskeletal:  Negative for joint  "swelling.   Skin:  Negative for rash and wound.   Neurological:  Negative for light-headedness and headaches.   Psychiatric/Behavioral:  Negative for dysphoric mood and sleep disturbance. The patient is not nervous/anxious.          OBJECTIVE     Physical Exam  Vitals:    11/07/23 0858   BP: 124/72   Pulse: 102   Resp: 15   Temp: 98.3 °F (36.8 °C)    Body mass index is 30.69 kg/m².  Weight: 81.1 kg (178 lb 12.7 oz)   Height: 5' 4" (162.6 cm)     Physical Exam  Vitals reviewed.   Constitutional:       General: She is not in acute distress.  HENT:      Right Ear: External ear normal.      Left Ear: External ear normal.      Nose: Nose normal.      Mouth/Throat:      Mouth: Mucous membranes are moist.   Eyes:      Extraocular Movements: Extraocular movements intact.      Conjunctiva/sclera: Conjunctivae normal.      Pupils: Pupils are equal, round, and reactive to light.   Pulmonary:      Effort: Pulmonary effort is normal.   Abdominal:      General: There is no distension.      Palpations: Abdomen is soft.   Musculoskeletal:         General: No swelling. Normal range of motion.      Cervical back: Normal range of motion.   Skin:     General: Skin is warm and dry.      Findings: No rash.   Neurological:      General: No focal deficit present.      Mental Status: She is alert and oriented to person, place, and time.   Psychiatric:         Mood and Affect: Mood normal.         Behavior: Behavior normal.           Health Maintenance         Date Due Completion Date    Hepatitis C Screening Never done ---    Pneumococcal Vaccines (Age 0-64) (1 - PCV) Never done ---    Influenza Vaccine (1) 09/01/2023 11/3/2021    COVID-19 Vaccine (4 - 2023-24 season) 09/01/2023 12/9/2021    Cervical Cancer Screening 01/14/2027 1/14/2022    TETANUS VACCINE 06/15/2030 6/15/2020              ASSESSMENT     32 y.o. female with     1. Annual physical exam    2. Polyuria    3. Polydipsia        PLAN:     1. Annual physical exam  - Discussed age " and gender appropriate screenings at this visit and encouraged a healthy diet low in simple carbohydrates, and increased physical activity.  Counseled on medically appropriate vaccines based on age and current health condition.  Screening test reviewed and discussed with patient.   - Hemoglobin A1C; Future  - Lipid Panel; Future  - TSH; Future  - Comprehensive Metabolic Panel; Future  - CBC Auto Differential; Future    2. Polyuria  - New. Will order labs for further eval. F/u pending results.  - Hemoglobin A1C; Future  - Lipid Panel; Future  - TSH; Future  - Comprehensive Metabolic Panel; Future  - CBC Auto Differential; Future  - C. trachomatis/N. gonorrhoeae by AMP DNA [facility]; Urine  - Urinalysis  - C. trachomatis/N. gonorrhoeae by AMP DNA [facility]; Urine; Future  - URINALYSIS; Future  - Vaginosis Screen by DNA Probe    3. Polydipsia  - New. Will order labs for further eval. F/u pending results.  - Hemoglobin A1C; Future  - Lipid Panel; Future  - TSH; Future  - Comprehensive Metabolic Panel; Future  - CBC Auto Differential; Future        Marine Bauman MD  11/07/2023 9:03 AM        Follow up in about 4 weeks (around 12/5/2023).

## 2023-11-08 ENCOUNTER — LAB VISIT (OUTPATIENT)
Dept: LAB | Facility: OTHER | Age: 33
End: 2023-11-08
Attending: STUDENT IN AN ORGANIZED HEALTH CARE EDUCATION/TRAINING PROGRAM
Payer: COMMERCIAL

## 2023-11-08 ENCOUNTER — PATIENT MESSAGE (OUTPATIENT)
Dept: FAMILY MEDICINE | Facility: CLINIC | Age: 33
End: 2023-11-08
Payer: COMMERCIAL

## 2023-11-08 DIAGNOSIS — E11.9 NEWLY DIAGNOSED DIABETES: ICD-10-CM

## 2023-11-08 LAB
B-OH-BUTYR BLD STRIP-SCNC: 1.6 MMOL/L (ref 0–0.5)
BACTERIAL VAGINOSIS DNA: NEGATIVE
C PEPTIDE SERPL-MCNC: 2.3 NG/ML (ref 0.78–5.19)
CANDIDA GLABRATA DNA: NEGATIVE
CANDIDA KRUSEI DNA: POSITIVE
CANDIDA RRNA VAG QL PROBE: POSITIVE
T VAGINALIS RRNA GENITAL QL PROBE: NEGATIVE

## 2023-11-08 PROCEDURE — 86341 ISLET CELL ANTIBODY: CPT | Mod: 91 | Performed by: STUDENT IN AN ORGANIZED HEALTH CARE EDUCATION/TRAINING PROGRAM

## 2023-11-08 PROCEDURE — 86341 ISLET CELL ANTIBODY: CPT | Performed by: STUDENT IN AN ORGANIZED HEALTH CARE EDUCATION/TRAINING PROGRAM

## 2023-11-08 PROCEDURE — 36415 COLL VENOUS BLD VENIPUNCTURE: CPT | Performed by: STUDENT IN AN ORGANIZED HEALTH CARE EDUCATION/TRAINING PROGRAM

## 2023-11-08 PROCEDURE — 82010 KETONE BODYS QUAN: CPT | Performed by: STUDENT IN AN ORGANIZED HEALTH CARE EDUCATION/TRAINING PROGRAM

## 2023-11-08 PROCEDURE — 84681 ASSAY OF C-PEPTIDE: CPT | Performed by: STUDENT IN AN ORGANIZED HEALTH CARE EDUCATION/TRAINING PROGRAM

## 2023-11-08 PROCEDURE — 86337 INSULIN ANTIBODIES: CPT | Performed by: STUDENT IN AN ORGANIZED HEALTH CARE EDUCATION/TRAINING PROGRAM

## 2023-11-08 RX ORDER — BLOOD-GLUCOSE,RECEIVER,CONT
1 EACH MISCELLANEOUS 4 TIMES DAILY
Qty: 1 EACH | Refills: 0 | Status: SHIPPED | OUTPATIENT
Start: 2023-11-08 | End: 2023-11-14 | Stop reason: SDUPTHER

## 2023-11-08 RX ORDER — BLOOD-GLUCOSE SENSOR
1 EACH MISCELLANEOUS
Qty: 13 EACH | Refills: 3 | Status: SHIPPED | OUTPATIENT
Start: 2023-11-08 | End: 2023-11-14 | Stop reason: SDUPTHER

## 2023-11-08 RX ORDER — BLOOD-GLUCOSE TRANSMITTER
1 EACH MISCELLANEOUS
Qty: 1 EACH | Refills: 3 | Status: SHIPPED | OUTPATIENT
Start: 2023-11-08 | End: 2023-11-14 | Stop reason: SDUPTHER

## 2023-11-09 DIAGNOSIS — E11.9 NEWLY DIAGNOSED DIABETES: Primary | ICD-10-CM

## 2023-11-09 RX ORDER — INSULIN PUMP SYRINGE, 3 ML
EACH MISCELLANEOUS
Qty: 1 EACH | Refills: 0 | Status: SHIPPED | OUTPATIENT
Start: 2023-11-09 | End: 2024-11-08

## 2023-11-09 RX ORDER — METFORMIN HYDROCHLORIDE 1000 MG/1
1000 TABLET ORAL 2 TIMES DAILY WITH MEALS
Qty: 180 TABLET | Refills: 3 | Status: SHIPPED | OUTPATIENT
Start: 2023-11-09 | End: 2023-12-22

## 2023-11-09 RX ORDER — LANCETS
EACH MISCELLANEOUS
Qty: 100 EACH | Refills: 5 | Status: SHIPPED | OUTPATIENT
Start: 2023-11-09

## 2023-11-12 LAB
GAD65 AB SER-SCNC: 2.23 NMOL/L
PANC ISLET CELL IGG SER-ACNC: NORMAL

## 2023-11-13 ENCOUNTER — E-CONSULT (OUTPATIENT)
Dept: ENDOCRINOLOGY | Facility: CLINIC | Age: 33
End: 2023-11-13
Payer: COMMERCIAL

## 2023-11-13 ENCOUNTER — TELEPHONE (OUTPATIENT)
Dept: FAMILY MEDICINE | Facility: CLINIC | Age: 33
End: 2023-11-13
Payer: COMMERCIAL

## 2023-11-13 DIAGNOSIS — E11.9 NEWLY DIAGNOSED DIABETES: Primary | ICD-10-CM

## 2023-11-13 DIAGNOSIS — E13.9 LADA (LATENT AUTOIMMUNE DIABETES IN ADULTS), MANAGED AS TYPE 1: Primary | ICD-10-CM

## 2023-11-13 LAB — INSULIN AB SER-SCNC: 0 NMOL/L (ref 0–0.02)

## 2023-11-13 PROCEDURE — 99451 NTRPROF PH1/NTRNET/EHR 5/>: CPT | Mod: S$GLB,,, | Performed by: INTERNAL MEDICINE

## 2023-11-13 PROCEDURE — 99451 PR INTERPROF, PHONE/INTERNET/EHR, CONSULT, >= 5MINS: ICD-10-PCS | Mod: S$GLB,,, | Performed by: INTERNAL MEDICINE

## 2023-11-13 NOTE — CONSULTS
[facility]  Response for E-Consult     Patient Name: Geni Brownlee  MRN: [id number]  Primary Care Provider: [place], Primary Doctor   Requesting Provider: Marine Bauman MD  E-Consult to Endocrinology  Consult performed by: Blaire Wilson MD  Consult ordered by: Marine Bauman MD          32-year-old with newly diagnosed diabetes.  Bry antibodies are positive but C-peptide is detectable.  I would call this person RYAN.  That being said ...the diabetes is uncontrolled and there is a mild elevation in beta hydroxybutyric acid so would start insulin.      Would start weight based at 0.5 units/kilogram, would start basal 20 and prandial 6 or 7 units.  We will refer to Education.      There may be a chance that she can come off some insulin because her pancreas is still working but would do that once diabetes is better controlled.    Referral endocrinology is appropriate for continued management  Total time of Consultation: 10 minute    I did not speak to the requesting provider verbally about this.     *This eConsult is based on the clinical data available to me and is furnished without benefit of a physical examination. The eConsult will need to be interpreted in light of any clinical issues or changes in patient status not available to me at the time of filing this eConsults. Significant changes in patient condition or level of acuity should result in immediate formal consultation and reevaluation. Please alert me if you have further questions.    Thank you for this eConsult referral.     MD [facility]

## 2023-11-13 NOTE — PROGRESS NOTES
Please call pt and let her know I have sent an e-consult to endocrinology to definitively determine whether she has type 1 or type 2 diabetes. Thanks!

## 2023-11-13 NOTE — TELEPHONE ENCOUNTER
----- Message from Marine Bauman MD sent at 11/13/2023  8:17 AM CST -----  Please call pt and let her know I have sent an e-consult to endocrinology to definitively determine whether she has type 1 or type 2 diabetes. Thanks!

## 2023-11-13 NOTE — TELEPHONE ENCOUNTER
----- Message from Marine Bauman MD sent at 11/13/2023 12:35 PM CST -----  Please call pt to schedule for appt at her earliest availability to discuss endocrinologist recommendations. Thanks!

## 2023-11-14 ENCOUNTER — OFFICE VISIT (OUTPATIENT)
Dept: FAMILY MEDICINE | Facility: CLINIC | Age: 33
End: 2023-11-14
Payer: COMMERCIAL

## 2023-11-14 VITALS
OXYGEN SATURATION: 98 % | HEART RATE: 119 BPM | HEIGHT: 64 IN | RESPIRATION RATE: 15 BRPM | WEIGHT: 174.81 LBS | BODY MASS INDEX: 29.84 KG/M2 | TEMPERATURE: 98 F | SYSTOLIC BLOOD PRESSURE: 122 MMHG | DIASTOLIC BLOOD PRESSURE: 78 MMHG

## 2023-11-14 DIAGNOSIS — E13.9 LADA (LATENT AUTOIMMUNE DIABETES IN ADULTS), MANAGED AS TYPE 1: Primary | ICD-10-CM

## 2023-11-14 LAB — ZNT8 AB SERPL IA-ACNC: <15 U/ML

## 2023-11-14 PROCEDURE — 1159F PR MEDICATION LIST DOCUMENTED IN MEDICAL RECORD: ICD-10-PCS | Mod: CPTII,S$GLB,, | Performed by: STUDENT IN AN ORGANIZED HEALTH CARE EDUCATION/TRAINING PROGRAM

## 2023-11-14 PROCEDURE — 3078F DIAST BP <80 MM HG: CPT | Mod: CPTII,S$GLB,, | Performed by: STUDENT IN AN ORGANIZED HEALTH CARE EDUCATION/TRAINING PROGRAM

## 2023-11-14 PROCEDURE — 3078F PR MOST RECENT DIASTOLIC BLOOD PRESSURE < 80 MM HG: ICD-10-PCS | Mod: CPTII,S$GLB,, | Performed by: STUDENT IN AN ORGANIZED HEALTH CARE EDUCATION/TRAINING PROGRAM

## 2023-11-14 PROCEDURE — 3046F HEMOGLOBIN A1C LEVEL >9.0%: CPT | Mod: CPTII,S$GLB,, | Performed by: STUDENT IN AN ORGANIZED HEALTH CARE EDUCATION/TRAINING PROGRAM

## 2023-11-14 PROCEDURE — 99999 PR PBB SHADOW E&M-EST. PATIENT-LVL IV: ICD-10-PCS | Mod: PBBFAC,,, | Performed by: STUDENT IN AN ORGANIZED HEALTH CARE EDUCATION/TRAINING PROGRAM

## 2023-11-14 PROCEDURE — 3008F BODY MASS INDEX DOCD: CPT | Mod: CPTII,S$GLB,, | Performed by: STUDENT IN AN ORGANIZED HEALTH CARE EDUCATION/TRAINING PROGRAM

## 2023-11-14 PROCEDURE — 99999 PR PBB SHADOW E&M-EST. PATIENT-LVL IV: CPT | Mod: PBBFAC,,, | Performed by: STUDENT IN AN ORGANIZED HEALTH CARE EDUCATION/TRAINING PROGRAM

## 2023-11-14 PROCEDURE — 3008F PR BODY MASS INDEX (BMI) DOCUMENTED: ICD-10-PCS | Mod: CPTII,S$GLB,, | Performed by: STUDENT IN AN ORGANIZED HEALTH CARE EDUCATION/TRAINING PROGRAM

## 2023-11-14 PROCEDURE — 3074F PR MOST RECENT SYSTOLIC BLOOD PRESSURE < 130 MM HG: ICD-10-PCS | Mod: CPTII,S$GLB,, | Performed by: STUDENT IN AN ORGANIZED HEALTH CARE EDUCATION/TRAINING PROGRAM

## 2023-11-14 PROCEDURE — 1159F MED LIST DOCD IN RCRD: CPT | Mod: CPTII,S$GLB,, | Performed by: STUDENT IN AN ORGANIZED HEALTH CARE EDUCATION/TRAINING PROGRAM

## 2023-11-14 PROCEDURE — 3046F PR MOST RECENT HEMOGLOBIN A1C LEVEL > 9.0%: ICD-10-PCS | Mod: CPTII,S$GLB,, | Performed by: STUDENT IN AN ORGANIZED HEALTH CARE EDUCATION/TRAINING PROGRAM

## 2023-11-14 PROCEDURE — 99214 OFFICE O/P EST MOD 30 MIN: CPT | Mod: S$GLB,,, | Performed by: STUDENT IN AN ORGANIZED HEALTH CARE EDUCATION/TRAINING PROGRAM

## 2023-11-14 PROCEDURE — 3074F SYST BP LT 130 MM HG: CPT | Mod: CPTII,S$GLB,, | Performed by: STUDENT IN AN ORGANIZED HEALTH CARE EDUCATION/TRAINING PROGRAM

## 2023-11-14 PROCEDURE — 99214 PR OFFICE/OUTPT VISIT, EST, LEVL IV, 30-39 MIN: ICD-10-PCS | Mod: S$GLB,,, | Performed by: STUDENT IN AN ORGANIZED HEALTH CARE EDUCATION/TRAINING PROGRAM

## 2023-11-14 RX ORDER — INSULIN GLARGINE-YFGN 100 [IU]/ML
20 INJECTION, SOLUTION SUBCUTANEOUS NIGHTLY
Qty: 18 ML | Refills: 0 | Status: SHIPPED | OUTPATIENT
Start: 2023-11-14 | End: 2023-11-14 | Stop reason: SDUPTHER

## 2023-11-14 RX ORDER — INSULIN GLARGINE-YFGN 100 [IU]/ML
20 INJECTION, SOLUTION SUBCUTANEOUS NIGHTLY
Qty: 18 ML | Refills: 0 | Status: SHIPPED | OUTPATIENT
Start: 2023-11-14 | End: 2023-12-22 | Stop reason: SDUPTHER

## 2023-11-14 RX ORDER — BLOOD-GLUCOSE,RECEIVER,CONT
1 EACH MISCELLANEOUS 4 TIMES DAILY
Qty: 1 EACH | Refills: 0 | Status: SHIPPED | OUTPATIENT
Start: 2023-11-14 | End: 2023-12-22

## 2023-11-14 RX ORDER — INSULIN LISPRO 100 [IU]/ML
INJECTION, SOLUTION SUBCUTANEOUS
Status: CANCELLED | OUTPATIENT
Start: 2023-11-14

## 2023-11-14 RX ORDER — GLUCAGON HCL 1 MG
1 VIAL (EA) INJECTION ONCE
Qty: 1 EACH | Refills: 0 | Status: SHIPPED | OUTPATIENT
Start: 2023-11-14 | End: 2023-11-14

## 2023-11-14 RX ORDER — BLOOD-GLUCOSE TRANSMITTER
1 EACH MISCELLANEOUS
Qty: 1 EACH | Refills: 3 | Status: SHIPPED | OUTPATIENT
Start: 2023-11-14 | End: 2023-12-22

## 2023-11-14 NOTE — PROGRESS NOTES
I sent this to my box but haven't seen anything yet. Can we get her scheduled for any available slot? Thanks!

## 2023-11-14 NOTE — PROGRESS NOTES
SUBJECTIVE     Chief Complaint   Patient presents with    Follow-up       HPI  Geni Brownlee is a 32 y.o. female with  newly diagnosed RYAN  that presents for follow-up of this.    Endocrinology recommendations discussed with patient, start 20 units QHS of basal insulin and prandial 6 units. Pt has obtained glucometer but does have trouble pricking her fingers 4 times daily. She is scheduled for diabetic education. No episodes of hypoglycemia, sugar has stayed in the 220-350 range.    PAST MEDICAL HISTORY:  Past Medical History:   Diagnosis Date    Depression        ALLERGIES AND MEDICATIONS: updated and reviewed.  Review of patient's allergies indicates:   Allergen Reactions    Fish oil Itching     Allergic to seafood     Current Outpatient Medications   Medication Sig Dispense Refill    metFORMIN (GLUCOPHAGE) 1000 MG tablet Take 1 tablet (1,000 mg total) by mouth 2 (two) times daily with meals. 180 tablet 3    paroxetine (PAXIL) 10 MG tablet Take 1 tablet (10 mg total) by mouth once daily. 30 tablet 2    blood sugar diagnostic Strp To check BG 3 times daily, to use with insurance preferred meter (Patient not taking: Reported on 11/14/2023) 100 each 0    blood-glucose meter kit To check BG 3 times daily, to use with insurance preferred meter (Patient not taking: Reported on 11/14/2023) 1 each 0    blood-glucose meter,continuous (DEXCOM G6 ) Misc check blood sugar 4 (four) times daily. 1 each 0    blood-glucose sensor Brie 1 each by Misc.(Non-Drug; Combo Route) route every 7 days. 13 each 3    blood-glucose transmitter (DEXCOM G6 TRANSMITTER) Brie 1 each by Misc.(Non-Drug; Combo Route) route every 3 (three) months. 1 each 3    HYDROcodone-acetaminophen (NORCO)  mg per tablet Take 1 tablet by mouth.      insulin glargine-yfgn (SEMGLEE,INSULIN GLARG-YFGN,PEN) 100 unit/mL (3 mL) InPn Inject 20 Units into the skin every evening. 18 mL 0    insulin lispro-aabc 100 unit/mL pen Inject 6 Units into the skin  "3 (three) times daily with meals. 5 Pen 3    lancets Misc To check BG 3 times daily, to use with insurance preferred meter (Patient not taking: Reported on 11/14/2023) 100 each 5    levocetirizine (XYZAL) 5 MG tablet TAKE 1 TABLET(5 MG) BY MOUTH EVERY EVENING (Patient not taking: Reported on 11/7/2022) 30 tablet 2    nitrofurantoin, macrocrystal-monohydrate, (MACROBID) 100 MG capsule Take 100 mg by mouth 2 (two) times daily.       Current Facility-Administered Medications   Medication Dose Route Frequency Provider Last Rate Last Admin    levonorgestreL (MIRENA) 20 mcg/24 hours (7 yrs) 52 mg IUD 1 Intra Uterine Device  1 Intra Uterine Device Intrauterine  Candi James MD   1 Intra Uterine Device at 02/24/22 1500       ROS  Review of Systems   Constitutional:  Negative for chills, fatigue and fever.   HENT:  Negative for rhinorrhea and sore throat.    Respiratory:  Negative for cough and shortness of breath.    Cardiovascular:  Negative for chest pain and palpitations.   Gastrointestinal:  Negative for constipation, diarrhea, nausea and vomiting.   Genitourinary:  Negative for dysuria.   Musculoskeletal:  Negative for joint swelling.   Skin:  Negative for rash and wound.   Neurological:  Negative for light-headedness and headaches.   Psychiatric/Behavioral:  Negative for dysphoric mood and sleep disturbance. The patient is not nervous/anxious.          OBJECTIVE     Physical Exam  Vitals:    11/14/23 1540   BP: 122/78   Pulse: (!) 119   Resp: 15   Temp: 97.7 °F (36.5 °C)    Body mass index is 30.01 kg/m².  Weight: 79.3 kg (174 lb 13.2 oz)   Height: 5' 4" (162.6 cm)     Physical Exam  Vitals reviewed.   Constitutional:       General: She is not in acute distress.  HENT:      Right Ear: External ear normal.      Left Ear: External ear normal.      Nose: Nose normal.      Mouth/Throat:      Mouth: Mucous membranes are moist.   Eyes:      Extraocular Movements: Extraocular movements intact.      Conjunctiva/sclera: " Conjunctivae normal.      Pupils: Pupils are equal, round, and reactive to light.   Pulmonary:      Effort: Pulmonary effort is normal.   Abdominal:      General: There is no distension.      Palpations: Abdomen is soft.   Musculoskeletal:         General: No swelling. Normal range of motion.      Cervical back: Normal range of motion.   Skin:     General: Skin is warm and dry.      Findings: No rash.   Neurological:      General: No focal deficit present.      Mental Status: She is alert and oriented to person, place, and time.   Psychiatric:         Mood and Affect: Mood normal.         Behavior: Behavior normal.           Health Maintenance         Date Due Completion Date    Hepatitis C Screening Never done ---    Pneumococcal Vaccines (Age 0-64) (1 - PCV) Never done ---    Influenza Vaccine (1) 09/01/2023 11/3/2021    COVID-19 Vaccine (4 - 2023-24 season) 09/01/2023 12/9/2021    Cervical Cancer Screening 01/14/2027 1/14/2022    TETANUS VACCINE 06/15/2030 6/15/2020              ASSESSMENT     32 y.o. female with     1. 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 Discussed at length new diagnosis of RYAN, will start insulin as above. Glucagon kit ordered and warning signs and symptoms of hypoglycemia discussed with patient. Discussed the need to keep source of glucose available at all times. Pt will attend diabetic education later this week. F/u 1 month and refer to endocrine for continued management.  - glucagon HCL (GLUCAGON, HCL, EMERGENCY KIT) 1 mg SolR; Inject 1 mg as directed once. for 1 dose  Dispense: 1 each; Refill: 0  - Ambulatory referral/consult to Endocrinology; Future  - blood-glucose meter,continuous (DEXCOM G6 ) Misc; check blood sugar 4 (four) times daily.  Dispense: 1 each; Refill: 0  - blood-glucose transmitter (DEXCOM G6 TRANSMITTER) Brie; 1 each by Misc.(Non-Drug; Combo Route)  route every 3 (three) months.  Dispense: 1 each; Refill: 3  - blood-glucose sensor Brie; 1 each by Misc.(Non-Drug; Combo Route) route every 7 days.  Dispense: 13 each; Refill: 3  - insulin glargine-yfgn (SEMGLEE,INSULIN GLARG-YFGN,PEN) 100 unit/mL (3 mL) InPn; Inject 20 Units into the skin every evening.  Dispense: 18 mL; Refill: 0  - insulin lispro-aabc 100 unit/mL pen; Inject 6 Units into the skin 3 (three) times daily with meals.  Dispense: 5 Pen; Refill: 3      36 minutes were spent in chart review, documentation and review of results, and evaluation, treatment, and especially counseling of patient on the same day of service.    Marine Bauman MD  11/15/2023 4:13 PM        Follow up in about 4 weeks (around 12/12/2023).

## 2023-11-17 ENCOUNTER — CLINICAL SUPPORT (OUTPATIENT)
Dept: DIABETES | Facility: CLINIC | Age: 33
End: 2023-11-17
Payer: COMMERCIAL

## 2023-11-17 VITALS — HEIGHT: 64 IN | WEIGHT: 179.81 LBS | BODY MASS INDEX: 30.7 KG/M2

## 2023-11-17 DIAGNOSIS — E11.9 NEWLY DIAGNOSED DIABETES: ICD-10-CM

## 2023-11-17 DIAGNOSIS — E13.9 LADA (LATENT AUTOIMMUNE DIABETES IN ADULTS), MANAGED AS TYPE 1: Primary | ICD-10-CM

## 2023-11-17 PROCEDURE — G0108 PR DIAB MANAGE TRN  PER INDIV: ICD-10-PCS | Mod: S$GLB,,, | Performed by: DIETITIAN, REGISTERED

## 2023-11-17 PROCEDURE — 99999 PR PBB SHADOW E&M-EST. PATIENT-LVL II: ICD-10-PCS | Mod: PBBFAC,,, | Performed by: DIETITIAN, REGISTERED

## 2023-11-17 PROCEDURE — G0108 DIAB MANAGE TRN  PER INDIV: HCPCS | Mod: S$GLB,,, | Performed by: DIETITIAN, REGISTERED

## 2023-11-17 PROCEDURE — 99999 PR PBB SHADOW E&M-EST. PATIENT-LVL II: CPT | Mod: PBBFAC,,, | Performed by: DIETITIAN, REGISTERED

## 2023-11-17 NOTE — PROGRESS NOTES
"Diabetes Care Specialist Progress Note  Author: Yari Guthrie RD, CDE  Date: 11/17/2023    Program Intake  Reason for Diabetes Program Visit:: Initial Diabetes Assessment  Current diabetes risk level:: moderate  In the last 12 months, have you:: none    Lab Results   Component Value Date    HGBA1C 9.9 (H) 11/07/2023       Clinical    Weight: 81.5 kg (179 lb 12.6 oz)   Height: 5' 4" (162.6 cm)   Body mass index is 30.86 kg/m².    Patient Health Rating  Compared to other people your age, how would you rate your health?: Good    Problem Review  Reviewed Problem List with Patient: yes  Active comorbidities affecting diabetes self-care.: no  Reviewed health maintenance: yes    Clinical Assessment  Current Diabetes Treatment: Oral Medication, Insulin  Have you ever experienced hypoglycemia (low blood sugar)?: yes  In the last month, how often have you experienced low blood sugar?: once every other week  Are you able to tell when your blood sugar is low?: Yes  What symptoms do you experience?: Shaky, Dizzy/Light-headed  Have you ever been hospitalized because your blood sugar was too low?: no  How do you treat hypoglycemia (low blood sugar)?: 5-6 pieces of hard candy  Have you ever experienced hyperglycemia (high blood sugar)?: yes  In the last month, how often have you experienced high blood sugar?: more than once a day  Are you able to tell when your blood sugar is high?: Yes  What are your symptoms?: thirst, frequent urination  Have you ever been hospitalized because your blood sugar was high?: no    Medication Information  How do you obtain your medications?: Patient drives  How many days a week do you miss your medications?: Never (just started MDI)  Do you sometimes have difficulty refilling your medications?: No  Medication adherence impacting ability to self-manage diabetes?: No    Current DM meds:  Metformin 1000mg BID  Semglee 20 units HS  Humalog 6 units AC    Labs  Do you have regular lab work to monitor " your medications?: Yes  Type of Regular Lab Work: A1c, Microalbumin, Cholesterol, CBC  Where do you get your labs drawn?: [facility]  Lab Compliance Barriers: No    Nutritional Status  Meal Plan 24 Hour Recall: Breakfast, Lunch, Dinner, Snack  Meal Plan 24 Hour Recall - Breakfast: pb on wheat toast, strawberries, coffee with splenda  Meal Plan 24 Hour Recall - Lunch: chick-naida-a or cane's or burger (works in [place] - eats at food court)  Meal Plan 24 Hour Recall - Dinner: chicken or beef, vegetables, baked potato  Meal Plan 24 Hour Recall - Snack: string cheese, pickle, nuts, drinks mostly water and sometimes a coke zero  Change in appetite?: No  Dentation:: Intact    Additional Social History    Support  Does anyone support you with your diabetes care?: yes  Who supports you?: self, other (see comments) (lives alone. all family are in [place]. has a neighbor she can call if needs urgent assistance.)  Who takes you to your medical appointments?: self  Does the current support meet the patient's needs?: Yes  Is Support an area impacting ability to self-manage diabetes?: No    Access to Mass Media & Technology  Does the patient have access to any of the following devices or technologies?: Smart phone, Internet Access  Media or technology needs impacting ability to self-manage diabetes?: No    Cognitive/Behavioral Health  Alert and Oriented: Yes  Difficulty Thinking: No  Requires Prompting: No  Requires assistance for routine expression?: No  Cognitive or behavioral barriers impacting ability to self-manage diabetes?: No    Culture/Scientology  Culture or Druze beliefs that may impact ability to access healthcare: No    Communication  Language preference: English  Hearing Problems: No  Vision Problems: No  Communication needs impacting ability to self-manage diabetes?: No    Health Literacy  Preferred Learning Method: Face to Face, Reading Materials, Web Based  How often do you need to have someone help you read  instructions, pamphlets, or written material from your doctor or pharmacy?: Rarely  Health literacy needs impacting ability to self-manage diabetes?: No      Diabetes Self-Management Skills Assessment    Diabetes Disease Process/Treatment Options  Patient/caregiver able to state what happens when someone has diabetes.: somewhat  Patient/caregiver knows what type of diabetes they have.: yes  Diabetes Type : Other (RYAN)  Patient/caregiver able to identify at least three signs and symptoms of diabetes.: yes  Identified signs and symptoms:: increased thirst, frequent urination, fatigue  Patient able to identify at least three risk factors for diabetes.: yes  Identified risk factors:: family history  Diabetes Disease Process/Treatment Options: Skills Assessment Completed: Yes  Assessment indicates:: Instruction Needed  Area of need?: Yes    Nutrition/Healthy Eating  Challenges to healthy eating:: portion control, eating out, going to parties  Method of carbohydrate measurement:: eyeballing/guessing  Patient can identify foods that impact blood sugar.: yes  Patient-identified foods:: starches (bread, pasta, rice, cereal), sweets, soda  Nutrition/Healthy Eating Skills Assessment Completed:: Yes  Assessment indicates:: Instruction Needed  Area of need?: Yes    Physical Activity/Exercise  Physical Activity/Exercise Skills Assessment Completed: : No  Deffered due to:: Time    Medications  Patient is able to describe current diabetes management routine.: yes  Diabetes management routine:: insulin, oral medications  Patient is able to identify current diabetes medications, dosages, and appropriate timing of medications.: yes  Patient understands the purpose of the medications taken for diabetes.: yes  Patient reports problems or concerns with current medication regimen.: yes  Medication regimen problems/concerns:: other (see comments) (pt's family member has a pump and she wants to transition to pump therapy as soon as  possible)  Medication Skills Assessment Completed:: Yes  Assessment indicates:: Instruction Needed  Area of need?: Yes    Home Blood Glucose Monitoring  Patient states that blood sugar is checked at home daily.: yes  Monitoring Method:: personal continuous glucose monitor  Personal CGM type:: Dexcom G6  Patient is able to use personal CGM appropriately.: yes  CGM Report reviewed?: no  Unable to download personal CGM: time limitations and pt only started taking Humalog yesterday  Home Blood Glucose Monitoring Skills Assessment Completed: : Yes  Assessment indicates:: Adequate understanding  Area of need?: No    Acute Complications  Patient is able to identify types of acute complications: Yes  Patient Identified:: Hypoglycemia, Hyperglycemia  Patient is able to state the basic meaning of hypoglycemia?: Yes  Able to state the blood sugar range for hypoglycemia?: no (see comments)  Patient can identify general symptoms of hypoglycemia: yes  Patient identified:: shakiness  Able to state proper treatment of hypoglycemia?: yes  Patient identified:: 5-6 pieces of hard candy (gummy bears)  Patient is able to state the basic meaning of hyperglycemia?: Yes  Able to state the blood sugar range for hyperglycemia?: no (see comments)  Patient able to state proper treatment of hyperglycemia?: yes  Patient identified:: increase water intake, contact provider, monitor blood sugar  Acute Complications Skills Assessment Completed: : Yes  Assessment indicates:: Instruction Needed  Area of need?: Yes    Chronic Complications  Chronic Complications Skills Assessment Completed: : No  Deferred due to:: Time    Psychosocial/Coping  Psychosocial/Coping Skills Assessment Completed: : No  Deffered due to:: Time      Assessment Summary and Plan    Based on today's diabetes care assessment, the following areas of need were identified:          11/17/2023    12:01 AM   Social   Support No   Access to Mass Media/Tech No   Cognitive/Behavioral  Health No   Culture/Jain No   Communication No   Health Literacy No            11/17/2023    12:01 AM   Clinical   Medication Adherence No   Lab Compliance No            11/17/2023    12:01 AM   Diabetes Self-Management Skills   Diabetes Disease Process/Treatment Options Yes - Ed on pathophysiology of RYAN, honeymoon phase, possibility of needing insulin titration, and when to reach out to MD with BG trend outside of goal  Ed on importance of using lifestyle changes + appropriate medications to control BG and slow disease progression     Nutrition/Healthy Eating Yes - see care planning   Medication Yes - see care planning   Home Blood Glucose Monitoring No   Acute Complications Yes -   Ed on hypoglycemia:  What is considered a low BG  Signs and symptoms  How to treat w/ 15/15 rule   Discussed appropriate treatment options: juice, soda, glucose tabs   When to call clinic for medication adjustment r/t frequent hypoglycemia   Pt has goal to carry fast acting sugar source in vehicle for emergencies    Ed on hyperglycemia:   What is considered dangerously high   Signs and symptoms discussed   Reviewed ER prompts r/t DKA/HHS symptoms   Discussed increased risk r/t infection, stress, missed medications   Ed on when to call doctor for medication adjustments            Today's interventions were provided through individual discussion, instruction, and written materials were provided.      Patient verbalized understanding of instruction and written materials.  Pt was able to return back demonstration of instructions today. Patient understood key points, needs reinforcement and further instruction.     Diabetes Self-Management Care Plan:    Today's Diabetes Self-Management Care Plan was developed with Geni's input. Geni has agreed to work toward the following goal(s) to improve his/her overall diabetes control.      Care Plan: Diabetes Management   Updates made since 10/18/2023 12:00 AM        Problem: Medications          Goal: Will take MDI as prescribed.    Start Date: 11/17/2023   Expected End Date: 12/17/2023   Priority: High   Barriers: No Barriers Identified   Note:    11/17/23 - Pt is newly dx with RYAN and is new to MDI. Started taking Humalog yesterday. Has seen dramatic improvement in glucose. 136 this morning and lowest yesterday was 88. She verbalized appropriate injection technique. Reviewed site selection and rotating sites. However, she has been taking Humalog after eating. Educated on action time of Humalog and instructed to take 5-15 min before each meal, skip Humalog if skipping meal and do not take extra Humalog between meals if <4 hours since last shot. Also educated on titrating Humalog for low carb meal and correcting highs. She also thought she couldn't take Humalog and Semglee close together - thought she needed to skip Humalog at dinner if eating around the same time she takes Semglee. Explained how they work differently and okay to still take Humalog with dinner. Pt verbalized understanding of all. She is very interested in getting on insulin pump therapy as soon as possible. Has a family member who has had a great experience with pump. Discussed briefly advances in pump options, insurance often requires 6 months on injections prior to approval of pump therapy but can vary depending on plan, and that out of pocket with commercial insurance can often be ~20% of cost of device. She recently changed her plan to one that covers diabetes devices well and feels cost will not be a barrier. She has appt with Alexandrea Lewis NP in a couple of months. Encouraged pt to start working on identifying and counting carbs now.        Task: Reviewed with patient all current diabetes medications and provided basic review of the purpose, dosage, frequency, side effects, and storage of both oral and injectable diabetes medications. Completed 11/17/2023        Task: Discussed guidelines for preventing, detecting and treating  hypoglycemia and hyperglycemia and reviewed the importance of meal and medication timing with diabetes mediations for prevention of hypoglycemia and maximum drug benefit. Completed 11/17/2023        Problem: Healthy Eating         Goal: Eat 30-45g/2-3 servings of Carbohydrate per meal.    Start Date: 11/17/2023   Expected End Date: 12/17/2023   Priority: Medium   Barriers: No Barriers Identified   Note:    11/17/23 - Has been doing her own research online regarding diabetes. Reviewed beware of misinformation online, particularly regarding diabetes nutrition - encourage credible sources. She shows some understanding of what foods raise BG but unsure about some foods. Provided education on sources of carbohydrate, choosing more complex/high fiber carbs vs simple carbs, portion sizes using dry measuring cups and food models for visual aid, label reading, and balancing meals with lean protein and non-starchy vegetables. Reviewed meal planning, plate method, and went over some examples.       Task: Reviewed the sources and role of Carbohydrate, Protein, and Fat and how each nutrient impacts blood sugar. Completed 11/17/2023        Task: Provided visual examples using dry measuring cups, food models, and other familiar objects such as computer mouse, deck or cards, tennis ball etc. to help with visualization of portions. Completed 11/17/2023        Task: Explained how to count carbohydrates using the food label and the use of dry measuring cups for accurate carb counting. Completed 11/17/2023        Task: Discussed strategies for choosing healthier menu options when dining out. Completed 11/17/2023        Task: Recommended replacing beverages containing high sugar content with noncaloric/sugar free options and/or water. Completed 11/17/2023        Task: Review the importance of balancing carbohydrates with each meal using portion control techniques to count servings of carbohydrate and label reading to identify serving  size and amount of total carbs per serving. Completed 11/17/2023        Task: Provided Sample plate method and reviewed the use of the plate to estimate amounts of carbohydrate per meal. Completed 11/17/2023          Follow Up Plan     Follow up in about 4 weeks (around 12/15/2023) for dexcom report review and follow-up.    Today's care plan and follow up schedule was discussed with patient.  Geni verbalized understanding of the care plan, goals, and agrees to follow up plan.        The patient was encouraged to communicate with his/her health care provider/physician and care team regarding his/her condition(s) and treatment.  I provided the patient with my contact information today and encouraged to contact me via phone or [facility] Patient Portal as needed.     Length of Visit   Total Time: 60 Minutes

## 2023-12-14 ENCOUNTER — PATIENT MESSAGE (OUTPATIENT)
Dept: DIABETES | Facility: CLINIC | Age: 33
End: 2023-12-14

## 2023-12-14 ENCOUNTER — CLINICAL SUPPORT (OUTPATIENT)
Dept: DIABETES | Facility: CLINIC | Age: 33
End: 2023-12-14
Payer: COMMERCIAL

## 2023-12-14 VITALS — HEIGHT: 64 IN | BODY MASS INDEX: 31.24 KG/M2 | WEIGHT: 183 LBS

## 2023-12-14 DIAGNOSIS — E13.9 LADA (LATENT AUTOIMMUNE DIABETES IN ADULTS), MANAGED AS TYPE 1: Primary | ICD-10-CM

## 2023-12-14 PROCEDURE — G0108 PR DIAB MANAGE TRN  PER INDIV: ICD-10-PCS | Mod: S$GLB,,, | Performed by: DIETITIAN, REGISTERED

## 2023-12-14 PROCEDURE — G0108 DIAB MANAGE TRN  PER INDIV: HCPCS | Mod: S$GLB,,, | Performed by: DIETITIAN, REGISTERED

## 2023-12-14 PROCEDURE — 99999 PR PBB SHADOW E&M-EST. PATIENT-LVL II: CPT | Mod: PBBFAC,,, | Performed by: DIETITIAN, REGISTERED

## 2023-12-14 PROCEDURE — 99999 PR PBB SHADOW E&M-EST. PATIENT-LVL II: ICD-10-PCS | Mod: PBBFAC,,, | Performed by: DIETITIAN, REGISTERED

## 2023-12-14 NOTE — PROGRESS NOTES
"Diabetes Care Specialist Progress Note  Author: Yari Guthrie RD, CDE  Date: 12/14/2023    Program Intake  Reason for Diabetes Program Visit:: Intervention  Type of Intervention:: Individual  Individual: Education  Education: Self-Management Skill Review, Pattern Management  Current diabetes risk level:: moderate  In the last 12 months, have you:: none    Lab Results   Component Value Date    HGBA1C 9.9 (H) 11/07/2023       Clinical    Weight: 83 kg (182 lb 15.7 oz)   Height: 5' 4" (162.6 cm)   Body mass index is 31.41 kg/m².    Current DM meds:  Metformin 1000mg BID  Semglee 20 units HS  Humalog 6 units AC     Monitoring:  Dexcom G6        Today's interventions were provided through individual discussion, instruction, and written materials were provided.      Patient verbalized understanding of instruction and written materials.  Pt was able to return back demonstration of instructions today. Patient understood key points, needs reinforcement and further instruction.     Diabetes Self-Management Care Plan:    Today's Diabetes Self-Management Care Plan was developed with Geni's input. Geni has agreed to work toward the following goal(s) to improve his/her overall diabetes control.      Care Plan: Diabetes Management   Updates made since 11/14/2023 12:00 AM        Problem: Medications         Goal: Will take MDI as prescribed.    Start Date: 11/17/2023   Expected End Date: 12/17/2023   This Visit's Progress: On track   Priority: High   Barriers: No Barriers Identified   Note:    11/17/23 - Pt is newly dx with RYAN and is new to MDI. Started taking Humalog yesterday. Has seen dramatic improvement in glucose. 136 this morning and lowest yesterday was 88. She verbalized appropriate injection technique. Reviewed site selection and rotating sites. However, she has been taking Humalog after eating. Educated on action time of Humalog and instructed to take 5-15 min before each meal, skip Humalog if skipping meal " "and do not take extra Humalog between meals if <4 hours since last shot. Also educated on titrating Humalog for low carb meal and correcting highs. She also thought she couldn't take Humalog and Semglee close together - thought she needed to skip Humalog at dinner if eating around the same time she takes Semglee. Explained how they work differently and okay to still take Humalog with dinner. Pt verbalized understanding of all. She is very interested in getting on insulin pump therapy as soon as possible. Has a family member who has had a great experience with pump. Discussed briefly advances in pump options, insurance often requires 6 months on injections prior to approval of pump therapy but can vary depending on plan, and that out of pocket with commercial insurance can often be ~20% of cost of device. She recently changed her plan to one that covers diabetes devices well and feels cost will not be a barrier. She has appt with Alexandrea Lewis NP in a couple of months. Encouraged pt to start working on identifying and counting carbs now.     12/14/23 - Pt is consistently taking Humalog 15 min before each meal and Semglee every night. She is occasionally having lows. Discussed "honeymoon phase" and pancreas still producing some insulin although not enough. Pt reports lows are typically at work and feels they are more r/t taking full 6 unit Humalog dose with lower carb meal. Discussed cutting dose in 1/2 and only taking 3 units with lower carb meal. However, pt states she is a very literal person and doesn't like guessing at dosage. Discussed using insulin to carb ratio would be a much more specific way of calculating dose. Pt is very open to changing to [facility]. Sent message to Dr. Wilson for advise. Pt is also still very interested in potentially getting insulin pump as soon as possible. She does not think tubing would work for her - feels would rip out tubing often - her cousin has [email or web address] and she has been looking up " [employer] videos about it. Briefly demonstrated use with simulator clay and discussed would do complete training if ordered by endocrine provider (seeing Alexandrea Lewis NP in Jan). Encouraged pt to discuss further with Alexandrea at that appt and scheduled follow-up [place] after.        Task: Reviewed with patient all current diabetes medications and provided basic review of the purpose, dosage, frequency, side effects, and storage of both oral and injectable diabetes medications. Completed 11/17/2023        Task: Discussed guidelines for preventing, detecting and treating hypoglycemia and hyperglycemia and reviewed the importance of meal and medication timing with diabetes mediations for prevention of hypoglycemia and maximum drug benefit. Completed 11/17/2023        Problem: Healthy Eating         Goal: Eat 30-45g/2-3 servings of Carbohydrate per meal.    Start Date: 11/17/2023   Expected End Date: 12/17/2023   This Visit's Progress: On track   Priority: Medium   Barriers: No Barriers Identified   Note:    11/17/23 - Has been doing her own research online regarding diabetes. Reviewed beware of misinformation online, particularly regarding diabetes nutrition - encourage credible sources. She shows some understanding of what foods raise BG but unsure about some foods. Provided education on sources of carbohydrate, choosing more complex/high fiber carbs vs simple carbs, portion sizes using dry measuring cups and food models for visual aid, label reading, and balancing meals with lean protein and non-starchy vegetables. Reviewed meal planning, plate method, and went over some examples.    12/14/23 - Has been checking labels, choosing more complex carbs, swapping for lower carb snacks, and avoiding sugary beverages. Has been doing basic carb counting but unsure about some foods that do not have labels. Reviewed portion sizes for 15g CHO and adding up carbs at meals. Also explained subtracting for high fiber choices. Reviewed  multiple examples. Pt caught on quickly and shows good understanding.        Task: Reviewed the sources and role of Carbohydrate, Protein, and Fat and how each nutrient impacts blood sugar. Completed 11/17/2023        Task: Provided visual examples using dry measuring cups, food models, and other familiar objects such as computer mouse, deck or cards, tennis ball etc. to help with visualization of portions. Completed 11/17/2023        Task: Explained how to count carbohydrates using the food label and the use of dry measuring cups for accurate carb counting. Completed 11/17/2023        Task: Discussed strategies for choosing healthier menu options when dining out. Completed 11/17/2023        Task: Recommended replacing beverages containing high sugar content with noncaloric/sugar free options and/or water. Completed 11/17/2023        Task: Review the importance of balancing carbohydrates with each meal using portion control techniques to count servings of carbohydrate and label reading to identify serving size and amount of total carbs per serving. Completed 11/17/2023        Task: Provided Sample plate method and reviewed the use of the plate to estimate amounts of carbohydrate per meal. Completed 11/17/2023          Follow Up Plan     Follow up in about 6 weeks (around 1/25/2024) for preparing for possible pump therapy.    Today's care plan and follow up schedule was discussed with patient.  Geni verbalized understanding of the care plan, goals, and agrees to follow up plan.        The patient was encouraged to communicate with his/her health care provider/physician and care team regarding his/her condition(s) and treatment.  I provided the patient with my contact information today and encouraged to contact me via phone or [facility] Patient Portal as needed.     Length of Visit   Total Time: 60 Minutes

## 2023-12-22 ENCOUNTER — OFFICE VISIT (OUTPATIENT)
Dept: ENDOCRINOLOGY | Facility: CLINIC | Age: 33
End: 2023-12-22
Payer: COMMERCIAL

## 2023-12-22 VITALS
TEMPERATURE: 98 F | DIASTOLIC BLOOD PRESSURE: 80 MMHG | HEART RATE: 86 BPM | SYSTOLIC BLOOD PRESSURE: 108 MMHG | BODY MASS INDEX: 31.58 KG/M2 | WEIGHT: 184 LBS

## 2023-12-22 DIAGNOSIS — E13.9 LADA (LATENT AUTOIMMUNE DIABETES IN ADULTS), MANAGED AS TYPE 2: Primary | ICD-10-CM

## 2023-12-22 DIAGNOSIS — E11.649 HYPOGLYCEMIA ASSOCIATED WITH DIABETES: ICD-10-CM

## 2023-12-22 PROCEDURE — 3079F DIAST BP 80-89 MM HG: CPT | Mod: CPTII,S$GLB,, | Performed by: NURSE PRACTITIONER

## 2023-12-22 PROCEDURE — 1159F PR MEDICATION LIST DOCUMENTED IN MEDICAL RECORD: ICD-10-PCS | Mod: CPTII,S$GLB,, | Performed by: NURSE PRACTITIONER

## 2023-12-22 PROCEDURE — 1160F PR REVIEW ALL MEDS BY PRESCRIBER/CLIN PHARMACIST DOCUMENTED: ICD-10-PCS | Mod: CPTII,S$GLB,, | Performed by: NURSE PRACTITIONER

## 2023-12-22 PROCEDURE — 3008F BODY MASS INDEX DOCD: CPT | Mod: CPTII,S$GLB,, | Performed by: NURSE PRACTITIONER

## 2023-12-22 PROCEDURE — 1160F RVW MEDS BY RX/DR IN RCRD: CPT | Mod: CPTII,S$GLB,, | Performed by: NURSE PRACTITIONER

## 2023-12-22 PROCEDURE — 3046F HEMOGLOBIN A1C LEVEL >9.0%: CPT | Mod: CPTII,S$GLB,, | Performed by: NURSE PRACTITIONER

## 2023-12-22 PROCEDURE — 99214 OFFICE O/P EST MOD 30 MIN: CPT | Mod: S$GLB,,, | Performed by: NURSE PRACTITIONER

## 2023-12-22 PROCEDURE — 3074F SYST BP LT 130 MM HG: CPT | Mod: CPTII,S$GLB,, | Performed by: NURSE PRACTITIONER

## 2023-12-22 PROCEDURE — 3008F PR BODY MASS INDEX (BMI) DOCUMENTED: ICD-10-PCS | Mod: CPTII,S$GLB,, | Performed by: NURSE PRACTITIONER

## 2023-12-22 PROCEDURE — 99999 PR PBB SHADOW E&M-EST. PATIENT-LVL IV: ICD-10-PCS | Mod: PBBFAC,,, | Performed by: NURSE PRACTITIONER

## 2023-12-22 PROCEDURE — 99214 PR OFFICE/OUTPT VISIT, EST, LEVL IV, 30-39 MIN: ICD-10-PCS | Mod: S$GLB,,, | Performed by: NURSE PRACTITIONER

## 2023-12-22 PROCEDURE — 99999 PR PBB SHADOW E&M-EST. PATIENT-LVL IV: CPT | Mod: PBBFAC,,, | Performed by: NURSE PRACTITIONER

## 2023-12-22 PROCEDURE — 1159F MED LIST DOCD IN RCRD: CPT | Mod: CPTII,S$GLB,, | Performed by: NURSE PRACTITIONER

## 2023-12-22 PROCEDURE — 3074F PR MOST RECENT SYSTOLIC BLOOD PRESSURE < 130 MM HG: ICD-10-PCS | Mod: CPTII,S$GLB,, | Performed by: NURSE PRACTITIONER

## 2023-12-22 PROCEDURE — 3079F PR MOST RECENT DIASTOLIC BLOOD PRESSURE 80-89 MM HG: ICD-10-PCS | Mod: CPTII,S$GLB,, | Performed by: NURSE PRACTITIONER

## 2023-12-22 PROCEDURE — 3046F PR MOST RECENT HEMOGLOBIN A1C LEVEL > 9.0%: ICD-10-PCS | Mod: CPTII,S$GLB,, | Performed by: NURSE PRACTITIONER

## 2023-12-22 RX ORDER — INSULIN GLARGINE-YFGN 100 [IU]/ML
20 INJECTION, SOLUTION SUBCUTANEOUS NIGHTLY
Qty: 15 ML | Refills: 2 | Status: SHIPPED | OUTPATIENT
Start: 2023-12-22 | End: 2024-03-21

## 2023-12-22 RX ORDER — BLOOD-GLUCOSE SENSOR
EACH MISCELLANEOUS
Qty: 12 EACH | Refills: 3 | Status: SHIPPED | OUTPATIENT
Start: 2023-12-22 | End: 2024-03-25 | Stop reason: SDUPTHER

## 2023-12-22 RX ORDER — METFORMIN HYDROCHLORIDE 500 MG/1
1000 TABLET, EXTENDED RELEASE ORAL 2 TIMES DAILY WITH MEALS
Qty: 360 TABLET | Refills: 1 | Status: SHIPPED | OUTPATIENT
Start: 2023-12-22 | End: 2024-03-25 | Stop reason: SDUPTHER

## 2023-12-22 NOTE — PATIENT INSTRUCTIONS
A1C goal: <7%  Fasting/premeal blood glucose goal:   2 hour post-meal blood glucose goal: less than 180    Continue Semglee 20 units nightly, every 24 hours.   Carb ratio is aggressive at 10-15. Pt is probably closer to 18-20.   However, suspect pt does not require prandial insulin to control glucoses. Glucose trends are more akin to type 2 diabetic pattern.   Ok to stop scheduled Lyumjev before meals.   Start Lyumjev correction scale to correct high glucoses:   Blood sugar 150 to 200, + 2 units  Blood sugar 201 to 250, + 4 units  Blood sugar 251 to 300, + 6 units  Blood sugar 301 to 350, + 8 units   Blood sugar greater than 350, + 10 units    Continue metformin 2000 mg/day but switch to metformin  mg tablets. Take 4 tablets once daily with lunch. If unable to tolerate due to cramping/GI upset, then split to 2 tablets twice daily.   Maintain healthy eating habits.   When Dexcom G6 transmitter expires, switch to Dexcom G7.   Return to clinic in 3 months with labs prior.

## 2023-12-22 NOTE — PROGRESS NOTES
CC: This 33 y.o. White female  is here for evaluation of  DM along with comorbidities indicated in the Visit Diagnosis section of this encounter.    HPI: Geni Brownlee was diagnosed with RYAN in Nov 2023.  Metformin started at time of dx.  CARMELINA antibody elevated at 2.23 but c-peptide was detectable. MDI started very soon after dx d/t mild elevation in beta hydroxybutyric acid, per e-consult from Dr. Wilson.     DM COMPLICATIONS: none    New to Endocrine. Pt has met with diabetes educator twice already since dx.  She recently switched from fixed prandial insulin doses ac to carb counting with ICR of 10.   Averages Lyumjev 2 units ac for about 30 grams of carbs but BG still dropping.  Limiting carb intake but eats more carbs in the evenings.     LAST DIABETES EDUCATION: 12/2023    HOSPITALIZED FOR DIABETES  -  No.    SIGNIFICANT DIABETES MED HISTORY: n/a    PRESCRIBED DIABETES MEDICATIONS:   Metformin 1000 mg bid   Semglee 20 units hs   Lyumjev ac based on ICR of 10     Misses medication doses - occasional skips metformin PM     Rotates injections: abdomen   Changes insulin pen needles: yes      SELF MONITORING BLOOD GLUCOSE: Dexcom G6 CGM     CGM interpretation: BGs quite stable and overall well-controlled. Mild lows after Lyumjev doses noted.  Highest BGs d/t higher carb diet in the evenings.           HYPOGLYCEMIC EPISODES: symptoms start in the 60s. -- shaky   Corrects with gummy bears or hard candy.      CURRENT DIET: tries to eat fairly healthy with good amount of fresh produce. Currently eats worse at night when she comes home late from work. Very busy at work at the moment   Eats 3 meals/day. Breakfast is smallest meal.       CURRENT EXERCISE: none formal     SOCIAL: works at [employer]       /80   Pulse 86   Temp 98.2 °F (36.8 °C)   Wt 83.5 kg (184 lb)   BMI 31.58 kg/m²     ROS:   CONSTITUTIONAL: Appetite good, denies fatigue  EYES: No visual disturbances  GI: No nausea, vomiting, cramping; + occ  "diarrhea  : No urinary frequency   NEURO: No paresthesias.   OTHER: no polydipsia       PHYSICAL EXAM:  GENERAL: Well developed, well nourished. No acute distress.   PSYCH: AAOx3, appropriate mood and affect, conversant, well-groomed. Judgement and insight good.   NEURO: Cranial nerves grossly intact. Speech clear.   CHEST: Respirations even and unlabored.       Hemoglobin A1C   Date Value Ref Range Status   11/07/2023 9.9 (H) 4.0 - 5.6 % Final     Comment:     ADA Screening Guidelines:  5.7-6.4%  Consistent with prediabetes  >or=6.5%  Consistent with diabetes    High levels of fetal hemoglobin interfere with the HbA1C  assay. Heterozygous hemoglobin variants (HbS, HgC, etc)do  not significantly interfere with this assay.   However, presence of multiple variants may affect accuracy.         Lab Results   Component Value Date    CPEPTIDE 2.30 11/08/2023    GLUTAMICACID 2.23 (H) 11/08/2023    ISLETCELLANT <1:4 11/08/2023        Lab Results   Component Value Date    CHOL 149 11/07/2023    CHOL 147 07/09/2020     Lab Results   Component Value Date    HDL 24 (L) 11/07/2023    HDL 38 (L) 07/09/2020     Lab Results   Component Value Date    LDLCALC 73.2 11/07/2023    LDLCALC 84.0 07/09/2020     Lab Results   Component Value Date    TRIG 259 (H) 11/07/2023    TRIG 125 07/09/2020     Lab Results   Component Value Date    CHOLHDL 16.1 (L) 11/07/2023    CHOLHDL 25.9 07/09/2020           Component Value Date/Time     (L) 11/07/2023 1008    K 4.2 11/07/2023 1008     11/07/2023 1008    CO2 16 (L) 11/07/2023 1008    BUN 12 11/07/2023 1008    CREATININE 1.1 11/07/2023 1008     (H) 11/07/2023 1008    CALCIUM 9.3 11/07/2023 1008    ALKPHOS 57 11/07/2023 1008    AST 24 11/07/2023 1008    ALT 31 11/07/2023 1008    BILITOT 0.5 11/07/2023 1008    EGFRNORACEVR >60 11/07/2023 1008    ESTGFRAFRICA >60 01/11/2022 1522         No results found for: "LABMICR", "CREATRANDUR", "MICALBCREAT"          ASSESSMENT and " PLAN:    A1C GOAL: < 6.5%     1. 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   Carb ratio is aggressive at 10-15. Pt is probably closer to 18-20.   However, suspect pt does not require prandial insulin to control glucoses. Glucose trends are more akin to type 2 diabetic pattern.   Ok to stop scheduled Lyumjev before meals.   Start Lyumjev correction scale to correct high glucoses:   Blood sugar 150 to 200, + 2 units  Blood sugar 201 to 250, + 4 units  Blood sugar 251 to 300, + 6 units  Blood sugar 301 to 350, + 8 units   Blood sugar greater than 350, + 10 units    Continue metformin 2000 mg/day but switch to metformin  mg tablets. Take 4 tablets once daily with lunch to improve adherence. If unable to tolerate due to cramping/GI upset, then split to 2 tablets twice daily.   Maintain healthy eating habits.   When Dexcom G6 transmitter expires, switch to Dexcom G7.   Return to clinic in 3 months with labs prior.         Ambulatory referral/consult to Endocrinology    blood-glucose sensor (DEXCOM G7 SENSOR) Brie    metFORMIN (GLUCOPHAGE-XR) 500 MG ER 24hr tablet    insulin lispro-aabc 100 unit/mL pen    insulin glargine-yfgn (SEMGLEE,INSULIN GLARG-YFGN,PEN) 100 unit/mL (3 mL) InPn    Hemoglobin A1C    Microalbumin/Creatinine Ratio, Urine      2. Hypoglycemia associated with diabetes  Stop prandial insulin regimen, change to correction scale.             Orders Placed This Encounter   Procedures    Hemoglobin A1C     Standing Status:   Future     Standing Expiration Date:   2/19/2025    Microalbumin/Creatinine Ratio, Urine     Standing Status:   Future     Standing Expiration Date:   2/19/2025     Order Specific Question:   Specimen Source     Answer:   Urine        Follow up in about 3 months (around 3/22/2024).     Thank you very much for allowing me to participate in Geni Brownlee's care.

## 2024-01-26 ENCOUNTER — CLINICAL SUPPORT (OUTPATIENT)
Dept: DIABETES | Facility: CLINIC | Age: 34
End: 2024-01-26
Payer: COMMERCIAL

## 2024-01-26 DIAGNOSIS — E13.9 LADA (LATENT AUTOIMMUNE DIABETES IN ADULTS), MANAGED AS TYPE 1: Primary | ICD-10-CM

## 2024-01-26 PROCEDURE — G0108 DIAB MANAGE TRN  PER INDIV: HCPCS | Mod: S$GLB,,, | Performed by: DIETITIAN, REGISTERED

## 2024-01-31 NOTE — PROGRESS NOTES
Diabetes Care Specialist Progress Note  Author: Yari Guthrie RD, CDE  Date: 1/31/2024    Program Intake  Reason for Diabetes Program Visit:: Intervention  Type of Intervention:: Individual  Individual: Education  Education: Self-Management Skill Review  Current diabetes risk level:: moderate  In the last 12 months, have you:: none    Lab Results   Component Value Date    HGBA1C 9.9 (H) 11/07/2023       Clinical    Current DM meds:  Semglee 20 HS  Lyumjev correction scale only, 1:25, starting at 150  Metformin 1000mg BID    Monitoring:  Dexcom G7        Today's interventions were provided through individual discussion, instruction, and written materials were provided.      Patient verbalized understanding of instruction and written materials.  Pt was able to return back demonstration of instructions today. Patient understood key points, needs reinforcement and further instruction.     Diabetes Self-Management Care Plan:    Today's Diabetes Self-Management Care Plan was developed with Geni's input. Geni has agreed to work toward the following goal(s) to improve his/her overall diabetes control.      Care Plan: Diabetes Management   Updates made since 1/1/2024 12:00 AM        Problem: Medications         Goal: Will take MDI as prescribed.    Start Date: 11/17/2023   Expected End Date: 12/17/2023   This Visit's Progress: Met   Recent Progress: On track   Priority: High   Barriers: No Barriers Identified   Note:    11/17/23 - Pt is newly dx with RYAN and is new to MDI. Started taking Humalog yesterday. Has seen dramatic improvement in glucose. 136 this morning and lowest yesterday was 88. She verbalized appropriate injection technique. Reviewed site selection and rotating sites. However, she has been taking Humalog after eating. Educated on action time of Humalog and instructed to take 5-15 min before each meal, skip Humalog if skipping meal and do not take extra Humalog between meals if <4 hours since last  "shot. Also educated on titrating Humalog for low carb meal and correcting highs. She also thought she couldn't take Humalog and Semglee close together - thought she needed to skip Humalog at dinner if eating around the same time she takes Semglee. Explained how they work differently and okay to still take Humalog with dinner. Pt verbalized understanding of all. She is very interested in getting on insulin pump therapy as soon as possible. Has a family member who has had a great experience with pump. Discussed briefly advances in pump options, insurance often requires 6 months on injections prior to approval of pump therapy but can vary depending on plan, and that out of pocket with commercial insurance can often be ~20% of cost of device. She recently changed her plan to one that covers diabetes devices well and feels cost will not be a barrier. She has appt with Alexandrea Lewis NP in a couple of months. Encouraged pt to start working on identifying and counting carbs now.     12/14/23 - Pt is consistently taking Humalog 15 min before each meal and Semglee every night. She is occasionally having lows. Discussed "honeymoon phase" and pancreas still producing some insulin although not enough. Pt reports lows are typically at work and feels they are more r/t taking full 6 unit Humalog dose with lower carb meal. Discussed cutting dose in 1/2 and only taking 3 units with lower carb meal. However, pt states she is a very literal person and doesn't like guessing at dosage. Discussed using insulin to carb ratio would be a much more specific way of calculating dose. Pt is very open to changing to [facility]. Sent message to Dr. Wilson for advise. Pt is also still very interested in potentially getting insulin pump as soon as possible. She does not think tubing would work for her - feels would rip out tubing often - her cousin has OmniPod5 and she has been looking up [employer] videos about it. Briefly demonstrated use with simulator clay " and discussed would do complete training if ordered by endocrine provider (seeing Alexandrea Lewis NP in Jan). Encouraged pt to discuss further with Alexandrea at that appt and scheduled follow-up DE after.     1/25/24 - Pt had visit with Alexandrea Lewis NP and has been able to reduce insulin to Lyumjev per scale only and continuing Semglee 20 units HS. Also taking Metformin 1000mg BID. Pt has been doing very well with this regimen and taking consistently. Dexcom report shows 90% TIR with very few lows.        Problem: Healthy Eating         Goal: Eat 30-45g/2-3 servings of Carbohydrate per meal.    Start Date: 11/17/2023   Expected End Date: 12/17/2023   This Visit's Progress: Met   Recent Progress: On track   Priority: Medium   Barriers: No Barriers Identified   Note:    11/17/23 - Has been doing her own research online regarding diabetes. Reviewed beware of misinformation online, particularly regarding diabetes nutrition - encourage credible sources. She shows some understanding of what foods raise BG but unsure about some foods. Provided education on sources of carbohydrate, choosing more complex/high fiber carbs vs simple carbs, portion sizes using dry measuring cups and food models for visual aid, label reading, and balancing meals with lean protein and non-starchy vegetables. Reviewed meal planning, plate method, and went over some examples.    12/14/23 - Has been checking labels, choosing more complex carbs, swapping for lower carb snacks, and avoiding sugary beverages. Has been doing basic carb counting but unsure about some foods that do not have labels. Reviewed portion sizes for 15g CHO and adding up carbs at meals. Also explained subtracting for high fiber choices. Reviewed multiple examples. Pt caught on quickly and shows good understanding.     1/25/24 - Has relaxed a little on self-restrictions (pt was at times overly restrictive and avoiding carbs). Now is allowing herself small portions of carb and balancing  meals appropriately with protein, complex carb and vegetables. Applauded efforts and encouraged continuing. She expressed feels she is following habits that are much more sustainable.          Follow Up Plan     Follow up in about 6 months (around 7/26/2024) for 6 month follow-up.    Today's care plan and follow up schedule was discussed with patient.  Geni verbalized understanding of the care plan, goals, and agrees to follow up plan.        The patient was encouraged to communicate with his/her health care provider/physician and care team regarding his/her condition(s) and treatment.  I provided the patient with my contact information today and encouraged to contact me via phone or [facility] Patient Portal as needed.     Length of Visit   Total Time: 30 Minutes

## 2024-02-09 ENCOUNTER — PATIENT OUTREACH (OUTPATIENT)
Dept: ADMINISTRATIVE | Facility: HOSPITAL | Age: 34
End: 2024-02-09
Payer: COMMERCIAL

## 2024-02-09 NOTE — PROGRESS NOTES
Population Health Chart Review & Patient Outreach Details   Dr. Bauman no longer with [facility],need establish care with another provider. If want to follow Dr Bauman can go to [facility] at [address] [place] [address] [id number], [place] [id number] - phone: [phone].  -- Pt see endocrine. Notified of PCP.  LM.    Further Action Needed If Patient Returns Outreach:            Updates Requested / Reviewed:     [x]  Care Everywhere    []     []  External Sources ([facility], [facility], DIS, etc.)    [] [facility]   [] [facility]   [] Other:    [x]  Care Team Updated   []  Removed  or Duplicate Orders   []  Immunization Reconciliation Completed / Queried    [] [place]   [] [place]   [] [place]   [] [place]      [facility] Topics Addressed and Outreach Outcomes / Actions Taken:             Breast Cancer Screening []  Mammogram Order Placed    []  Mammogram Screening Scheduled    []  External Records Requested & Care Team Updated if Applicable    []  External Records Uploaded & Care Team Updated if Applicable    []  Pt Declined Scheduling Mammogram    []  Pt Will Schedule with External Provider / Order Routed & Care Team Updated if Applicable              Cervical Cancer Screening []  Pap Smear Scheduled in Primary Care or OBGYN    []  External Records Requested & Care Team Updated if Applicable       []  External Records Uploaded, Care Team Updated, & History Updated if Applicable    []  Patient Declined Scheduling Pap Smear    []  Patient Will Schedule with External Provider & Care Team Updated if Applicable                  Colorectal Cancer Screening []  Colonoscopy Case Request / Referral / Home Test Order Placed    []  External Records Requested & Care Team Updated if Applicable    []  External Records Uploaded, Care Team Updated, & History Updated if Applicable    []  Patient Declined Completing Colon Cancer Screening    []  Patient Will Schedule with External Provider & Care Team  Updated if Applicable    []  Fit Kit Mailed (add the SmartPhrase under additional notes)    []  Reminded Patient to Complete Home Test                Diabetic Eye Exam []  Eye Exam Screening Order Placed    []  Eye Camera Scheduled or Optometry/Ophthalmology Referral Placed    []  External Records Requested & Care Team Updated if Applicable    []  External Records Uploaded, Care Team Updated, & History Updated if Applicable    []  Patient Declined Scheduling Eye Exam    []  Patient Will Schedule with External Provider & Care Team Updated if Applicable             Blood Pressure Control []  Primary Care Follow Up Visit Scheduled     []  Remote Blood Pressure Reading Captured    []  Patient Declined Remote Reading or Scheduling Appt - Escalated to PCP    []  Patient Will Call Back or Send Portal Message with Reading                 HbA1c & Other Labs []  Overdue Lab(s) Ordered    []  Overdue Lab(s) Scheduled    []  External Records Uploaded & Care Team Updated if Applicable    []  Primary Care Follow Up Visit Scheduled     []  Reminded Patient to Complete A1c Home Test    []  Patient Declined Scheduling Labs or Will Call Back to Schedule    []  Patient Will Schedule with External Provider / Order Routed, & Care Team Updated if Applicable           Primary Care Appointment []  Primary Care Appt Scheduled    []  Patient Declined Scheduling or Will Call Back to Schedule    []  Pt Established with External Provider, Updated Care Team, & Informed Pt to Notify Payor if Applicable           Medication Adherence /    Statin Use []  Primary Care Appointment Scheduled    []  Patient Reminded to  Prescription    []  Patient Declined, Provider Notified if Needed    []  Sent Provider Message to Review to Evaluate Pt for Statin, Add Exclusion Dx Codes, Document   Exclusion in Problem List, Change Statin Intensity Level to Moderate or High Intensity if Applicable                Osteoporosis Screening []  Dexa Order Placed     []  Dexa Appointment Scheduled    []  External Records Requested & Care Team Updated    []  External Records Uploaded, Care Team Updated, & History Updated if Applicable    []  Patient Declined Scheduling Dexa or Will Call Back to Schedule    []  Patient Will Schedule with External Provider / Order Routed & Care Team Updated if Applicable       Additional Notes:

## 2024-02-23 DIAGNOSIS — E11.9 NEWLY DIAGNOSED DIABETES: ICD-10-CM

## 2024-03-15 ENCOUNTER — LAB VISIT (OUTPATIENT)
Dept: LAB | Facility: HOSPITAL | Age: 34
End: 2024-03-15
Attending: NURSE PRACTITIONER
Payer: COMMERCIAL

## 2024-03-15 DIAGNOSIS — E13.9 LADA (LATENT AUTOIMMUNE DIABETES IN ADULTS), MANAGED AS TYPE 2: ICD-10-CM

## 2024-03-15 LAB
ALBUMIN/CREAT UR: 4.2 UG/MG (ref 0–30)
CREAT UR-MCNC: 144 MG/DL (ref 15–325)
MICROALBUMIN UR DL<=1MG/L-MCNC: 6 UG/ML

## 2024-03-15 PROCEDURE — 82043 UR ALBUMIN QUANTITATIVE: CPT | Performed by: NURSE PRACTITIONER

## 2024-03-25 ENCOUNTER — OFFICE VISIT (OUTPATIENT)
Dept: ENDOCRINOLOGY | Facility: CLINIC | Age: 34
End: 2024-03-25
Payer: COMMERCIAL

## 2024-03-25 VITALS
WEIGHT: 175 LBS | HEART RATE: 81 BPM | TEMPERATURE: 98 F | SYSTOLIC BLOOD PRESSURE: 112 MMHG | DIASTOLIC BLOOD PRESSURE: 72 MMHG | BODY MASS INDEX: 30.04 KG/M2

## 2024-03-25 DIAGNOSIS — E13.9 LADA (LATENT AUTOIMMUNE DIABETES IN ADULTS), MANAGED AS TYPE 2: Primary | ICD-10-CM

## 2024-03-25 DIAGNOSIS — E11.649 HYPOGLYCEMIA ASSOCIATED WITH DIABETES: ICD-10-CM

## 2024-03-25 PROCEDURE — 3074F SYST BP LT 130 MM HG: CPT | Mod: CPTII,S$GLB,, | Performed by: NURSE PRACTITIONER

## 2024-03-25 PROCEDURE — 3061F NEG MICROALBUMINURIA REV: CPT | Mod: CPTII,S$GLB,, | Performed by: NURSE PRACTITIONER

## 2024-03-25 PROCEDURE — 3008F BODY MASS INDEX DOCD: CPT | Mod: CPTII,S$GLB,, | Performed by: NURSE PRACTITIONER

## 2024-03-25 PROCEDURE — 3044F HG A1C LEVEL LT 7.0%: CPT | Mod: CPTII,S$GLB,, | Performed by: NURSE PRACTITIONER

## 2024-03-25 PROCEDURE — 1159F MED LIST DOCD IN RCRD: CPT | Mod: CPTII,S$GLB,, | Performed by: NURSE PRACTITIONER

## 2024-03-25 PROCEDURE — 99214 OFFICE O/P EST MOD 30 MIN: CPT | Mod: S$GLB,,, | Performed by: NURSE PRACTITIONER

## 2024-03-25 PROCEDURE — 3078F DIAST BP <80 MM HG: CPT | Mod: CPTII,S$GLB,, | Performed by: NURSE PRACTITIONER

## 2024-03-25 PROCEDURE — 99999 PR PBB SHADOW E&M-EST. PATIENT-LVL III: CPT | Mod: PBBFAC,,, | Performed by: NURSE PRACTITIONER

## 2024-03-25 PROCEDURE — 1160F RVW MEDS BY RX/DR IN RCRD: CPT | Mod: CPTII,S$GLB,, | Performed by: NURSE PRACTITIONER

## 2024-03-25 PROCEDURE — 3066F NEPHROPATHY DOC TX: CPT | Mod: CPTII,S$GLB,, | Performed by: NURSE PRACTITIONER

## 2024-03-25 RX ORDER — INSULIN GLARGINE 100 [IU]/ML
17 INJECTION, SOLUTION SUBCUTANEOUS NIGHTLY
Qty: 30 ML | Refills: 2 | Status: SHIPPED | OUTPATIENT
Start: 2024-03-25

## 2024-03-25 RX ORDER — METFORMIN HYDROCHLORIDE 500 MG/1
2000 TABLET, EXTENDED RELEASE ORAL NIGHTLY
Qty: 360 TABLET | Refills: 2 | Status: SHIPPED | OUTPATIENT
Start: 2024-03-25 | End: 2025-03-25

## 2024-03-25 RX ORDER — BLOOD-GLUCOSE SENSOR
EACH MISCELLANEOUS
Qty: 12 EACH | Refills: 3 | Status: SHIPPED | OUTPATIENT
Start: 2024-03-25

## 2024-03-25 RX ORDER — INSULIN GLARGINE 100 [IU]/ML
20 INJECTION, SOLUTION SUBCUTANEOUS NIGHTLY
COMMUNITY
End: 2024-03-25 | Stop reason: SDUPTHER

## 2024-03-25 NOTE — PATIENT INSTRUCTIONS
Reduce Semglee to 17 units once daily.   Maintain healthy eating habits. Recommend meal prep.   Continue metformin and Lyumjev.   Return to clinic in 4-5 months with A1c prior. In-person visits.

## 2024-03-25 NOTE — PROGRESS NOTES
CC: This 33 y.o. White female  is here for evaluation of  DM along with comorbidities indicated in the Visit Diagnosis section of this encounter.    HPI: Geni Brownlee was diagnosed with RYAN in Nov 2023.  Metformin started at time of dx.  CARMELINA antibody elevated at 2.23 but c-peptide was detectable. MDI started very soon after dx d/t mild elevation in beta hydroxybutyric acid, per e-consult from Dr. Wilson.     DM COMPLICATIONS: none    Initial visit 12/22/23  New to Endocrine. Pt has met with diabetes educator twice already since dx.  She recently switched from fixed prandial insulin doses ac to carb counting with ICR of 10.   Averages Lyumjev 2 units ac for about 30 grams of carbs but BG still dropping.  Limiting carb intake but eats more carbs in the evenings.   Plan Continue Semglee 20 units nightly, every 24 hours.   Carb ratio is aggressive at 10-15. Pt is probably closer to 18-20.   However, suspect pt does not require prandial insulin to control glucoses. Glucose trends are more akin to type 2 diabetic pattern.   Ok to stop scheduled Lyumjev before meals.   Start Lyumjev correction scale to correct high glucoses: 150-200=+2, 201-250=+4; 251-300=+6; 301-350=+8, over 350=+10 units  Continue metformin 2000 mg/day but switch to metformin  mg tablets. Take 4 tablets once daily with lunch to improve adherence. If unable to tolerate due to cramping/GI upset, then split to 2 tablets twice daily.   Maintain healthy eating habits.   When Dexcom G6 transmitter expires, switch to Dexcom G7.   Return to clinic in 3 months with labs prior.     Interval hx  A1c is down from 9.9% upon diagnosis it is now 5.6 %.  90 day CGM average 128.   9 lb weight loss since lov.     She is taking Lyumjev as needed if postprandial glucoses are high but also taking 2-3 units ac if eating high carb/high fat. Tends to eat fast food when she comes home late from work. It's hard for pt to cook for just one person.     Recent BGs have  been high while she was sick with COVID and was taking Paxlovid.         LAST DIABETES EDUCATION: 12/2023, 1/2024     HOSPITALIZED FOR DIABETES  -  No.    SIGNIFICANT DIABETES MED HISTORY:   Basal/bolus insulin regimen changed to basal + SS at initial ov 12/2023    PRESCRIBED DIABETES MEDICATIONS:   Metformin ER 2000 mg hs   Semglee 20 units hs   Lyumjev pc based on ss: 150-200=+2, 201-250=+4; 251-300=+6; 301-350=+8, over 350=+10 units      Misses medication doses - denies   Rotates injections: abdomen   Changes insulin pen needles: yes      SELF MONITORING BLOOD GLUCOSE: Dexcom G7 CGM clay     CGM interpretation: BGs overall controlled, but BGs started rising last Thursday when she fell ill. Review of older CGM report shows BG spiking into high 200s late at night d/t diet.           HYPOGLYCEMIC EPISODES: symptoms start in the 60s. -- shaky   Corrects with gummy bears or hard candy.      CURRENT DIET: tries to eat fairly healthy with good amount of fresh produce most of the time.   Eats 3 meals/day. Breakfast is smallest meal.       CURRENT EXERCISE: none formal     SOCIAL: works at [employer]       /72   Pulse 81   Temp 98.4 °F (36.9 °C)   Wt 79.4 kg (175 lb)   BMI 30.04 kg/m²     ROS:   CONSTITUTIONAL: Appetite good, denies fatigue  GI: No nausea, vomiting, cramping; diarrhea resolved on ER metformin.       PHYSICAL EXAM:  GENERAL: Well developed, well nourished. No acute distress.   PSYCH: AAOx3, appropriate mood and affect, conversant, well-groomed. Judgement and insight good.   NEURO: Cranial nerves grossly intact. Speech clear.   CHEST: Respirations even and unlabored.     Protective Sensation (w/ 10 gram monofilament):  Right: Intact  Left: Intact    Visual Inspection:  Skin Breakdown -  Neither    Pedal Pulses:   Right: Present  Left: Present    Posterior Tibialis Pulses:   Right:Present  Left: Present      Hemoglobin A1C   Date Value Ref Range Status   03/15/2024 5.6 4.0 - 5.6 % Final     Comment:      ADA Screening Guidelines:  5.7-6.4%  Consistent with prediabetes  >or=6.5%  Consistent with diabetes    High levels of fetal hemoglobin interfere with the HbA1C  assay. Heterozygous hemoglobin variants (HbS, HgC, etc)do  not significantly interfere with this assay.   However, presence of multiple variants may affect accuracy.     11/07/2023 9.9 (H) 4.0 - 5.6 % Final     Comment:     ADA Screening Guidelines:  5.7-6.4%  Consistent with prediabetes  >or=6.5%  Consistent with diabetes    High levels of fetal hemoglobin interfere with the HbA1C  assay. Heterozygous hemoglobin variants (HbS, HgC, etc)do  not significantly interfere with this assay.   However, presence of multiple variants may affect accuracy.         Lab Results   Component Value Date    CPEPTIDE 2.30 11/08/2023    GLUTAMICACID 2.23 (H) 11/08/2023    ISLETCELLANT <1:4 11/08/2023        Lab Results   Component Value Date    CHOL 149 11/07/2023    CHOL 147 07/09/2020     Lab Results   Component Value Date    HDL 24 (L) 11/07/2023    HDL 38 (L) 07/09/2020     Lab Results   Component Value Date    LDLCALC 73.2 11/07/2023    LDLCALC 84.0 07/09/2020     Lab Results   Component Value Date    TRIG 259 (H) 11/07/2023    TRIG 125 07/09/2020     Lab Results   Component Value Date    CHOLHDL 16.1 (L) 11/07/2023    CHOLHDL 25.9 07/09/2020           Component Value Date/Time     (L) 11/07/2023 1008    K 4.2 11/07/2023 1008     11/07/2023 1008    CO2 16 (L) 11/07/2023 1008    BUN 12 11/07/2023 1008    CREATININE 1.1 11/07/2023 1008     (H) 11/07/2023 1008    CALCIUM 9.3 11/07/2023 1008    ALKPHOS 57 11/07/2023 1008    AST 24 11/07/2023 1008    ALT 31 11/07/2023 1008    BILITOT 0.5 11/07/2023 1008    EGFRNORACEVR >60 11/07/2023 1008    ESTGFRAFRICA >60 01/11/2022 1522         Lab Results   Component Value Date    LABMICR 6.0 03/15/2024    CREATRANDUR 144.0 03/15/2024    MICALBCREAT 4.2 03/15/2024             ASSESSMENT and PLAN:    A1C GOAL: <  6.5%     1. 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   Maintain healthy eating habits. Recommend meal prep.   Continue metformin and Lyumjev.   Return to clinic in 4-5 months with A1c prior. In-person visits.     blood-glucose sensor (DEXCOM G7 SENSOR) Brie    metFORMIN (GLUCOPHAGE-XR) 500 MG ER 24hr tablet    Hemoglobin A1C      2. Hypoglycemia associated with diabetes              Orders Placed This Encounter   Procedures    Hemoglobin A1C     Standing Status:   Future     Standing Expiration Date:   5/24/2025        Follow up in about 4 months (around 7/25/2024).

## 2024-06-26 DIAGNOSIS — E11.9 TYPE 2 DIABETES MELLITUS WITHOUT COMPLICATION, UNSPECIFIED WHETHER LONG TERM INSULIN USE: ICD-10-CM

## 2024-07-16 ENCOUNTER — TELEPHONE (OUTPATIENT)
Dept: ENDOCRINOLOGY | Facility: CLINIC | Age: 34
End: 2024-07-16
Payer: COMMERCIAL

## 2024-07-23 ENCOUNTER — TELEPHONE (OUTPATIENT)
Facility: CLINIC | Age: 34
End: 2024-07-23
Payer: COMMERCIAL

## 2024-07-23 DIAGNOSIS — U07.1 COVID: Primary | ICD-10-CM

## 2024-07-23 RX ORDER — NIRMATRELVIR AND RITONAVIR 300-100 MG
KIT ORAL
Qty: 30 TABLET | Refills: 0 | Status: SHIPPED | OUTPATIENT
Start: 2024-07-23 | End: 2024-07-28

## 2024-07-23 NOTE — TELEPHONE ENCOUNTER
----- Message from Esmer Flores MA sent at 7/23/2024  2:01 PM CDT -----  Please advise  ----- Message -----  From: Meghna Schafer  Sent: 7/23/2024   1:59 PM CDT  To: Mitul Hawthorne Staff    .Type: Patient Call Back    Who called: Self     What is the request in detail: Tested Positive for Covid on 7/23/24. Insurance company  stated she needs a prescription from her PCP for Paxlovid if she is high risk due to diabetes .  [id number] [place] [facility], [place] - [address]  [address]  [place] [address]  Phone: [phone] Fax: [phone]    Can the clinic reply by [facility]? No     Would the patient rather a call back or a response via [facility]? Call Back     Best call back number: .[phone] (Guy)       Additional Information:

## 2024-07-25 ENCOUNTER — CLINICAL SUPPORT (OUTPATIENT)
Dept: DIABETES | Facility: CLINIC | Age: 34
End: 2024-07-25
Payer: COMMERCIAL

## 2024-07-25 ENCOUNTER — LAB VISIT (OUTPATIENT)
Dept: LAB | Facility: OTHER | Age: 34
End: 2024-07-25
Attending: NURSE PRACTITIONER
Payer: COMMERCIAL

## 2024-07-25 VITALS — WEIGHT: 173 LBS | BODY MASS INDEX: 29.53 KG/M2 | HEIGHT: 64 IN

## 2024-07-25 DIAGNOSIS — E13.9 LADA (LATENT AUTOIMMUNE DIABETES IN ADULTS), MANAGED AS TYPE 1: Primary | ICD-10-CM

## 2024-07-25 DIAGNOSIS — E13.9 LADA (LATENT AUTOIMMUNE DIABETES IN ADULTS), MANAGED AS TYPE 2: ICD-10-CM

## 2024-07-25 LAB
ESTIMATED AVG GLUCOSE: 123 MG/DL (ref 68–131)
HBA1C MFR BLD: 5.9 % (ref 4–5.6)

## 2024-07-25 PROCEDURE — 99999 PR PBB SHADOW E&M-EST. PATIENT-LVL II: CPT | Mod: PBBFAC,,, | Performed by: DIETITIAN, REGISTERED

## 2024-07-25 PROCEDURE — 83036 HEMOGLOBIN GLYCOSYLATED A1C: CPT | Performed by: NURSE PRACTITIONER

## 2024-07-25 PROCEDURE — 36415 COLL VENOUS BLD VENIPUNCTURE: CPT | Performed by: NURSE PRACTITIONER

## 2024-07-25 NOTE — PROGRESS NOTES
"Diabetes Care Specialist Progress Note  Author: Yari Guthrie RD, CDE  Date: 7/25/2024    Intake    Program Intake  Reason for Diabetes Program Visit:: Post Program Follow-Up  Type of Intervention:: Individual  Individual: Education  Education: Self-Management Skill Review  Type of Follow-Up: 6 month  Current diabetes risk level:: low  In the last 12 months, have you:: none    Current Diabetes Treatment: Insulin, Oral Medications  Oral Medication Type/Dose: metformin xr 1000mg BID  Method of insulin delivery?: Injections  Injection Type: Pens  Pen Type/Dose: Semglee 17 units HS, Humalog correction scale only    Continuous Glucose Monitoring  Patient has CGM: Yes  Personal CGM type:: Dexcom G6  GMI Date: 07/25/24  GMI Value: 6.5 %    Lab Results   Component Value Date    HGBA1C 5.6 03/15/2024         Weight: 78.5 kg (173 lb)   Height: 5' 4" (162.6 cm)   Body mass index is 29.7 kg/m².    Lifestyle Coping Support & Clinical    Lifestyle/Coping/Support  Does anyone in your family have diabetes or does anyone in your family support you in your diabetes care?: co-workers, friends and family are supportive  List anything about Diabetes that causes you stress?: verbalized occasionally feels depressed about having chronic illness - sometimes feels exhausted by how much she has to plan ahead for things that used to she wouldn't plan at all (example: recently went tubing in [place] and had to pack extra insulin, testing supplies, snacks, etc)  How do you deal with stress/distress?: talk with friends/family and allows herself occasionaly "cheats"  Learning Barriers:: None  Culture or Adventist beliefs that may impact ability to access healthcare: No  Psychosocial/Coping Skills Assessment Completed: : Yes  Assessment indicates:: Adequate understanding  Area of need?: No         Diabetes Self-Management Skills Assessment    Physical Activity/Exercise  Patient's daily activity level:: lightly active  Patient formally exercises " outside of work.: no  Reasons for not exercising:: work schedule, other (see comments) (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, climbing ladders, etc)  Patient can identify forms of physical activity.: yes  Physical Activity/Exercise Skills Assessment Completed: : Yes  Assessment indicates:: Adequate understanding  Area of need?: No    Home Blood Glucose Monitoring  Personal CGM type:: Dexcom G6    Acute Complications  Have you ever had hypoglycemia (low BG 70 or less)?: yes  How often and what are your symptoms?: once or twice a week - feels very hungry and irritable  How do you treat hypoglycemia?: gummy fruit snacks if <70 - tries to prevent by snacking on protein bar or other carb snack if >70 but heading down  Have you ever had hyperglycemia (high  or more)?: no   Do you know the symptoms of high blood sugar and how to treat: fatigued/tired - treats with correction scale humalog and drinking plenty non-carb fluids  Have you ever had DKA?: no  Do you ever test for ketones?: no  Do you have a sick day plan?: no  Acute Complications Skills Assessment Completed: : Yes  Assessment indicates:: Instruction Needed  Area of need?: Yes    Chronic Complications  Reviewed health maintenance: yes  Have you completed your annual diabetes maintenance labwork? : yes  Do you examine your feet daily?: yes  Has your doctor examined your feet?: yes  Do you see a Dentist?: yes  Do you see an eye doctor?: yes  Chronic Complications Skills Assessment Completed: : Yes  Assessment indicates:: Adequate understanding  Area of need?: No      Assessment Summary and Plan    Based on today's diabetes care assessment, the following areas of need were identified:          7/25/2024    12:01 AM   Areas of Need   Lifestyle Coping Support No   Physical Activity/Exercise No   Acute Complications Yes - Pt is currently dealing with URI (home tested for covid and was neg). She is noticing BGs higher lately and  appropriately responding by taking Humalog with correction scale for spikes despite mostly only having broth and gatorade. Explained high BGs with infection/illness, increased risk of DKA, and sick day planning.    Ed on hyperglycemia:   What is considered dangerously high   Signs and symptoms discussed   Reviewed ER prompts r/t DKA/HHS symptoms   Discussed increased risk r/t infection, stress, missed medications   Ed on when to call doctor for medication adjustments      Chronic Complications No       Today's interventions were provided through individual discussion, instruction, and written materials were provided.      Patient verbalized understanding of instruction and written materials.  Pt was able to return back demonstration of instructions today. Patient understood key points, needs reinforcement and further instruction.     Diabetes Self-Management Care Plan:    Today's Diabetes Self-Management Care Plan was developed with Geni's input. Geni has agreed to work toward the following goal(s) to improve his/her overall diabetes control.      Care Plan: Diabetes Management   Updates made since 6/25/2024 12:00 AM        Problem: Medications         Goal: Will take MDI as prescribed. Completed 7/25/2024   Start Date: 11/17/2023   Expected End Date: 12/17/2023   Recent Progress: Met   Priority: High   Barriers: No Barriers Identified   Note:    11/17/23 - Pt is newly dx with RYAN and is new to MDI. Started taking Humalog yesterday. Has seen dramatic improvement in glucose. 136 this morning and lowest yesterday was 88. She verbalized appropriate injection technique. Reviewed site selection and rotating sites. However, she has been taking Humalog after eating. Educated on action time of Humalog and instructed to take 5-15 min before each meal, skip Humalog if skipping meal and do not take extra Humalog between meals if <4 hours since last shot. Also educated on titrating Humalog for low carb meal and  "correcting highs. She also thought she couldn't take Humalog and Semglee close together - thought she needed to skip Humalog at dinner if eating around the same time she takes Semglee. Explained how they work differently and okay to still take Humalog with dinner. Pt verbalized understanding of all. She is very interested in getting on insulin pump therapy as soon as possible. Has a family member who has had a great experience with pump. Discussed briefly advances in pump options, insurance often requires 6 months on injections prior to approval of pump therapy but can vary depending on plan, and that out of pocket with commercial insurance can often be ~20% of cost of device. She recently changed her plan to one that covers diabetes devices well and feels cost will not be a barrier. She has appt with Alexandrea Lewis NP in a couple of months. Encouraged pt to start working on identifying and counting carbs now.     12/14/23 - Pt is consistently taking Humalog 15 min before each meal and Semglee every night. She is occasionally having lows. Discussed "honeymoon phase" and pancreas still producing some insulin although not enough. Pt reports lows are typically at work and feels they are more r/t taking full 6 unit Humalog dose with lower carb meal. Discussed cutting dose in 1/2 and only taking 3 units with lower carb meal. However, pt states she is a very literal person and doesn't like guessing at dosage. Discussed using insulin to carb ratio would be a much more specific way of calculating dose. Pt is very open to changing to ICR. Sent message to Dr. Wilson for advise. Pt is also still very interested in potentially getting insulin pump as soon as possible. She does not think tubing would work for her - feels would rip out tubing often - her cousin has OmniPod5 and she has been looking up [employer] videos about it. Briefly demonstrated use with simulator clay and discussed would do complete training if ordered by " endocrine provider (seeing Alexandrea Lewis NP in Jan). Encouraged pt to discuss further with Alexandrea at that appt and scheduled follow-up DE after.     1/25/24 - Pt had visit with Alexandrea eLwis NP and has been able to reduce insulin to Lyumjev per scale only and continuing Semglee 20 units HS. Also taking Metformin 1000mg BID. Pt has been doing very well with this regimen and taking consistently. Dexcom report shows 90% TIR with very few lows.        Problem: Healthy Eating         Goal: Eat 30-45g/2-3 servings of Carbohydrate per meal. Completed 7/25/2024   Start Date: 11/17/2023   Expected End Date: 12/17/2023   Recent Progress: Met   Priority: Medium   Barriers: No Barriers Identified   Note:    11/17/23 - Has been doing her own research online regarding diabetes. Reviewed beware of misinformation online, particularly regarding diabetes nutrition - encourage credible sources. She shows some understanding of what foods raise BG but unsure about some foods. Provided education on sources of carbohydrate, choosing more complex/high fiber carbs vs simple carbs, portion sizes using dry measuring cups and food models for visual aid, label reading, and balancing meals with lean protein and non-starchy vegetables. Reviewed meal planning, plate method, and went over some examples.    12/14/23 - Has been checking labels, choosing more complex carbs, swapping for lower carb snacks, and avoiding sugary beverages. Has been doing basic carb counting but unsure about some foods that do not have labels. Reviewed portion sizes for 15g CHO and adding up carbs at meals. Also explained subtracting for high fiber choices. Reviewed multiple examples. Pt caught on quickly and shows good understanding.     1/25/24 - Has relaxed a little on self-restrictions (pt was at times overly restrictive and avoiding carbs). Now is allowing herself small portions of carb and balancing meals appropriately with protein, complex carb and vegetables.  Applauded efforts and encouraged continuing. She expressed feels she is following habits that are much more sustainable.          Follow Up Plan     Follow up in about 5 months (around 12/9/2024) for annual check in/follow-up during holiday season.    Today's care plan and follow up schedule was discussed with patient.  Geni verbalized understanding of the care plan, goals, and agrees to follow up plan.        The patient was encouraged to communicate with his/her health care provider/physician and care team regarding his/her condition(s) and treatment.  I provided the patient with my contact information today and encouraged to contact me via phone or [facility] as needed.     Length of Visit   Total Time: 30 Minutes

## 2024-07-25 NOTE — LETTER
July 25, 2024      Marine Bauman MD  [address]  [address]  [place] [address]         Patient: Geni Brownlee   MR Number: [id number]   YOB: 1990   Date of Visit: 7/25/2024       Dear Dr. Bauman:    Thank you for referring Geni for diabetes self-management education and support services. She has completed all components of our Diabetes Management Program. Below is a summary of her clinical outcomes and goal progress.    Patient Outcomes:    A1c Status:   Lab Results   Component Value Date    HGBA1C 5.6 03/15/2024    HGBA1C 9.9 (H) 11/07/2023       Care Plan: Diabetes Management   Updates made since 6/25/2024 12:00 AM        Problem: Medications         Goal: Will take MDI as prescribed. Completed 7/25/2024   Start Date: 11/17/2023   Expected End Date: 12/17/2023   Recent Progress: Met   Priority: High   Barriers: No Barriers Identified   Note:    11/17/23 - Pt is newly dx with RYAN and is new to MDI. Started taking Humalog yesterday. Has seen dramatic improvement in glucose. 136 this morning and lowest yesterday was 88. She verbalized appropriate injection technique. Reviewed site selection and rotating sites. However, she has been taking Humalog after eating. Educated on action time of Humalog and instructed to take 5-15 min before each meal, skip Humalog if skipping meal and do not take extra Humalog between meals if <4 hours since last shot. Also educated on titrating Humalog for low carb meal and correcting highs. She also thought she couldn't take Humalog and Semglee close together - thought she needed to skip Humalog at dinner if eating around the same time she takes Semglee. Explained how they work differently and okay to still take Humalog with dinner. Pt verbalized understanding of all. She is very interested in getting on insulin pump therapy as soon as possible. Has a family member who has had a great experience with pump. Discussed briefly advances in pump options,  "insurance often requires 6 months on injections prior to approval of pump therapy but can vary depending on plan, and that out of pocket with commercial insurance can often be ~20% of cost of device. She recently changed her plan to one that covers diabetes devices well and feels cost will not be a barrier. She has appt with Alexandrea Lewis NP in a couple of months. Encouraged pt to start working on identifying and counting carbs now.     12/14/23 - Pt is consistently taking Humalog 15 min before each meal and Semglee every night. She is occasionally having lows. Discussed "honeymoon phase" and pancreas still producing some insulin although not enough. Pt reports lows are typically at work and feels they are more r/t taking full 6 unit Humalog dose with lower carb meal. Discussed cutting dose in 1/2 and only taking 3 units with lower carb meal. However, pt states she is a very literal person and doesn't like guessing at dosage. Discussed using insulin to carb ratio would be a much more specific way of calculating dose. Pt is very open to changing to [facility]. Sent message to Dr. Wilson for advise. Pt is also still very interested in potentially getting insulin pump as soon as possible. She does not think tubing would work for her - feels would rip out tubing often - her cousin has OmniPod5 and she has been looking up [email or web address] videos about it. Briefly demonstrated use with simulator clay and discussed would do complete training if ordered by endocrine provider (seeing Alexandrea Lewis NP in Jan). Encouraged pt to discuss further with Alexandrea at that appt and scheduled follow-up DE after.     1/25/24 - Pt had visit with Alexandrea Lewis NP and has been able to reduce insulin to Lyumjev per scale only and continuing Semglee 20 units HS. Also taking Metformin 1000mg BID. Pt has been doing very well with this regimen and taking consistently. Dexcom report shows 90% TIR with very few lows.      7/25/24 - Now decreased to Semglee 17 units " HS per Alexandrea Lewis NP and lispro per correction scale only. Continues Metformin. Continues to have very good glycemic control with 84% TIR and 1% low.        Problem: Healthy Eating         Goal: Eat 30-45g/2-3 servings of Carbohydrate per meal. Completed 7/25/2024   Start Date: 11/17/2023   Expected End Date: 12/17/2023   Recent Progress: Met   Priority: Medium   Barriers: No Barriers Identified   Note:    11/17/23 - Has been doing her own research online regarding diabetes. Reviewed beware of misinformation online, particularly regarding diabetes nutrition - encourage credible sources. She shows some understanding of what foods raise BG but unsure about some foods. Provided education on sources of carbohydrate, choosing more complex/high fiber carbs vs simple carbs, portion sizes using dry measuring cups and food models for visual aid, label reading, and balancing meals with lean protein and non-starchy vegetables. Reviewed meal planning, plate method, and went over some examples.    12/14/23 - Has been checking labels, choosing more complex carbs, swapping for lower carb snacks, and avoiding sugary beverages. Has been doing basic carb counting but unsure about some foods that do not have labels. Reviewed portion sizes for 15g CHO and adding up carbs at meals. Also explained subtracting for high fiber choices. Reviewed multiple examples. Pt caught on quickly and shows good understanding.     1/25/24 - Has relaxed a little on self-restrictions (pt was at times overly restrictive and avoiding carbs). Now is allowing herself small portions of carb and balancing meals appropriately with protein, complex carb and vegetables. Applauded efforts and encouraged continuing. She expressed feels she is following habits that are much more sustainable.          Follow up:   Geni to attend medical appointments as scheduled  Geni to update you on her DM education progress as needed  Plan for follow-up during holiday  season for annual diabetes ed      If you have questions, please do not hesitate to call me. I look forward to providing additional education and support as needed.    Sincerely,    Yari Guthrie RD, CDE  Diabetes Care and

## 2024-08-16 ENCOUNTER — OFFICE VISIT (OUTPATIENT)
Dept: ENDOCRINOLOGY | Facility: CLINIC | Age: 34
End: 2024-08-16
Payer: COMMERCIAL

## 2024-08-16 VITALS
WEIGHT: 170 LBS | HEART RATE: 84 BPM | DIASTOLIC BLOOD PRESSURE: 70 MMHG | BODY MASS INDEX: 29.18 KG/M2 | SYSTOLIC BLOOD PRESSURE: 112 MMHG

## 2024-08-16 DIAGNOSIS — E78.1 HYPERTRIGLYCERIDEMIA: ICD-10-CM

## 2024-08-16 DIAGNOSIS — E13.9 LADA (LATENT AUTOIMMUNE DIABETES IN ADULTS), MANAGED AS TYPE 2: Primary | ICD-10-CM

## 2024-08-16 PROCEDURE — 99999 PR PBB SHADOW E&M-EST. PATIENT-LVL IV: CPT | Mod: PBBFAC,,, | Performed by: NURSE PRACTITIONER

## 2024-08-16 RX ORDER — DULAGLUTIDE 1.5 MG/.5ML
1.5 INJECTION, SOLUTION SUBCUTANEOUS
Qty: 4 PEN | Refills: 2 | Status: SHIPPED | OUTPATIENT
Start: 2024-08-16

## 2024-08-16 RX ORDER — DULAGLUTIDE 0.75 MG/.5ML
0.75 INJECTION, SOLUTION SUBCUTANEOUS
Qty: 4 PEN | Refills: 0 | Status: SHIPPED | OUTPATIENT
Start: 2024-08-16

## 2024-08-16 RX ORDER — INSULIN GLARGINE 100 [IU]/ML
12 INJECTION, SOLUTION SUBCUTANEOUS NIGHTLY
Qty: 30 ML | Refills: 2 | Status: SHIPPED | OUTPATIENT
Start: 2024-08-16

## 2024-08-16 NOTE — PATIENT INSTRUCTIONS
Reduce Semglee from 17 to 12 units once daily.     Start Trulicity 0.75 mg once weekly for 4 weeks.   Reduce metformin ER from 1000 mg twice daily to 1000 mg once daily at night. Stop morning dose.     Then increase to Trulicity 1.5 mg once weekly and STOP metformin.     Trulicity:   Potential side effects: nausea, diarrhea, constipation, bloating. Nausea for the first week or two is common.  Avoid big food portions and greasy/heavy foods. Appetite suppression is a temporary side effect which means maintaining a healthy lifestyle is important.     If glucoses drop less than 80 often, reduce from 12 to 10 units once daily. Contact office if still having lows.     Return to clinic in 3 months with labs prior.

## 2024-08-16 NOTE — PROGRESS NOTES
CC: This 33 y.o. White female  is here for evaluation of  DM along with comorbidities indicated in the Visit Diagnosis section of this encounter.    HPI: Geni Brownlee was diagnosed with RYAN in Nov 2023.  Metformin started at time of dx.  CARMELINA antibody elevated at 2.23 but c-peptide was detectable. MDI started very soon after dx d/t mild elevation in beta hydroxybutyric acid, per e-consult from Dr. Wilson.     DM COMPLICATIONS: none      Prior visit 3/2024  A1c is down from 9.9% upon diagnosis it is now 5.6 %.  90 day CGM average 128.   9 lb weight loss since lov.   She is taking Lyumjev as needed if postprandial glucoses are high but also taking 2-3 units ac if eating high carb/high fat. Tends to eat fast food when she comes home late from work. It's hard for pt to cook for just one person.   Recent BGs have been high while she was sick with COVID and was taking Paxlovid.   Plan Reduce Semglee to 17 units once daily.   Maintain healthy eating habits. Recommend meal prep.   Continue metformin and Lyumjev.   Return to clinic in 4-5 months with A1c prior. In-person visits.     Interval hx  A1c up a bit from 5.6% in March to 5.9% in July.   90 day CGM average 133.   She has lost 5 lb since lov.     Pt went on vacation last month and then got COVID. BGs have been labile with highs/lows since then. She reports generally high BGs follow overcorrection of hypoglycemia.   She's interested in weekly injection versus metformin because she doesn't always remember metformin in the mornings. Knows her work schedule will be more hectic into the holiday months.       LAST DIABETES EDUCATION: 12/2023, 1/2024, 7/2024    HOSPITALIZED FOR DIABETES  -  No.    SIGNIFICANT DIABETES MED HISTORY:   Basal/bolus insulin regimen (based on carb counting) changed to basal + SS at initial ov 12/2023       PRESCRIBED DIABETES MEDICATIONS:   Metformin ER 1000 mg bid - cannot tolerate 2000 mg qhs   Semglee 17 units hs     Lyumjev pc based on ss:  150-200=+2, 201-250=+4; 251-300=+6; 301-350=+8, over 350=+10 units      Misses medication doses - denies   Rotates injections: abdomen   Changes insulin pen needles: yes      SELF MONITORING BLOOD GLUCOSE: Dexcom G7 CGM clay     CGM interpretation: BGs overall controlled but with a good bit of fluctuation. Hyperglycemia secondary to rebound from lows, diet, and also acute illness. Hypoglycemia secondary to basal insulin dose.               HYPOGLYCEMIC EPISODES: symptoms start in the 60s. -- shaky   Corrects with gummy bears or hard candy.      CURRENT DIET: tries to eat fairly healthy with good amount of fresh produce most of the time.   Eats 3 meals/day. Breakfast is smallest meal.       CURRENT EXERCISE: none formal     SOCIAL: works at [employer]       /70   Pulse 84   Temp (P) 98.2 °F (36.8 °C)   Wt 77.1 kg (170 lb)   BMI 29.18 kg/m²     ROS:   CONSTITUTIONAL: Appetite good, denies fatigue  GI: No nausea, vomiting, cramping; no diarrhea       PHYSICAL EXAM:  GENERAL: Well developed, well nourished. No acute distress.   PSYCH: AAOx3, appropriate mood and affect, conversant, well-groomed. Judgement and insight good.   NEURO: Cranial nerves grossly intact. Speech clear.   CHEST: Respirations even and unlabored.       Hemoglobin A1C   Date Value Ref Range Status   07/25/2024 5.9 (H) 4.0 - 5.6 % Final     Comment:     ADA Screening Guidelines:  5.7-6.4%  Consistent with prediabetes  >or=6.5%  Consistent with diabetes    High levels of fetal hemoglobin interfere with the HbA1C  assay. Heterozygous hemoglobin variants (HbS, HgC, etc)do  not significantly interfere with this assay.   However, presence of multiple variants may affect accuracy.     03/15/2024 5.6 4.0 - 5.6 % Final     Comment:     ADA Screening Guidelines:  5.7-6.4%  Consistent with prediabetes  >or=6.5%  Consistent with diabetes    High levels of fetal hemoglobin interfere with the HbA1C  assay. Heterozygous hemoglobin variants (HbS, HgC,  etc)do  not significantly interfere with this assay.   However, presence of multiple variants may affect accuracy.     11/07/2023 9.9 (H) 4.0 - 5.6 % Final     Comment:     ADA Screening Guidelines:  5.7-6.4%  Consistent with prediabetes  >or=6.5%  Consistent with diabetes    High levels of fetal hemoglobin interfere with the HbA1C  assay. Heterozygous hemoglobin variants (HbS, HgC, etc)do  not significantly interfere with this assay.   However, presence of multiple variants may affect accuracy.         Lab Results   Component Value Date    CPEPTIDE 2.30 11/08/2023    GLUTAMICACID 2.23 (H) 11/08/2023    ISLETCELLANT <1:4 11/08/2023        Lab Results   Component Value Date    CHOL 149 11/07/2023    CHOL 147 07/09/2020     Lab Results   Component Value Date    HDL 24 (L) 11/07/2023    HDL 38 (L) 07/09/2020     Lab Results   Component Value Date    LDLCALC 73.2 11/07/2023    LDLCALC 84.0 07/09/2020     Lab Results   Component Value Date    TRIG 259 (H) 11/07/2023    TRIG 125 07/09/2020     Lab Results   Component Value Date    CHOLHDL 16.1 (L) 11/07/2023    CHOLHDL 25.9 07/09/2020           Component Value Date/Time     (L) 11/07/2023 1008    K 4.2 11/07/2023 1008     11/07/2023 1008    CO2 16 (L) 11/07/2023 1008    BUN 12 11/07/2023 1008    CREATININE 1.1 11/07/2023 1008     (H) 11/07/2023 1008    CALCIUM 9.3 11/07/2023 1008    ALKPHOS 57 11/07/2023 1008    AST 24 11/07/2023 1008    ALT 31 11/07/2023 1008    BILITOT 0.5 11/07/2023 1008    EGFRNORACEVR >60 11/07/2023 1008    ESTGFRAFRICA >60 01/11/2022 1522         Lab Results   Component Value Date    LABMICR 6.0 03/15/2024    CREATRANDUR 144.0 03/15/2024    MICALBCREAT 4.2 03/15/2024             ASSESSMENT and PLAN:    A1C GOAL: < 6.5%       1. RYAN (latent autoimmune diabetes in adults), managed as type 2  Reduce Semglee from 17 to 12 units once daily.     Start Trulicity 0.75 mg once weekly for 4 weeks.   Reduce metformin ER from 1000 mg twice  daily to 1000 mg once daily at night. Stop morning dose.     Then increase to Trulicity 1.5 mg once weekly and STOP metformin.     Trulicity:   Potential side effects: nausea, diarrhea, constipation, bloating. Nausea for the first week or two is common.  Avoid big food portions and greasy/heavy foods. Appetite suppression is a temporary side effect which means maintaining a healthy lifestyle is important.     If glucoses drop less than 80 often, reduce from 12 to 10 units once daily. Contact office if still having lows.     Return to clinic in 3 months with labs prior.               Glucose, Fasting    C-Peptide    Glutamic Acid Decarboxylase    Hemoglobin A1C      2. Hypertriglyceridemia  Lipid Panel             Orders Placed This Encounter   Procedures    Glucose, Fasting     Standing Status:   Future     Standing Expiration Date:   10/16/2025    C-Peptide     Standing Status:   Future     Standing Expiration Date:   10/15/2025    Glutamic Acid Decarboxylase     Standing Status:   Future     Standing Expiration Date:   10/15/2025    Hemoglobin A1C     Standing Status:   Future     Standing Expiration Date:   10/15/2025    Lipid Panel     Standing Status:   Future     Standing Expiration Date:   8/16/2025     Order Specific Question:   Send normal result to authorizing provider's In Basket if patient is active on MyChart:     Answer:   Yes        Follow up in about 3 months (around 11/16/2024).

## 2024-08-19 ENCOUNTER — PATIENT MESSAGE (OUTPATIENT)
Dept: ENDOCRINOLOGY | Facility: CLINIC | Age: 34
End: 2024-08-19
Payer: COMMERCIAL

## 2024-10-22 RX ORDER — INSULIN GLARGINE-YFGN 100 [IU]/ML
INJECTION, SOLUTION SUBCUTANEOUS
Qty: 15 ML | Refills: 1 | Status: SHIPPED | OUTPATIENT
Start: 2024-10-22

## 2024-11-08 ENCOUNTER — LAB VISIT (OUTPATIENT)
Dept: LAB | Facility: OTHER | Age: 34
End: 2024-11-08
Attending: NURSE PRACTITIONER
Payer: COMMERCIAL

## 2024-11-08 DIAGNOSIS — E13.9 LADA (LATENT AUTOIMMUNE DIABETES IN ADULTS), MANAGED AS TYPE 2: ICD-10-CM

## 2024-11-08 DIAGNOSIS — E78.1 HYPERTRIGLYCERIDEMIA: ICD-10-CM

## 2024-11-08 LAB
C PEPTIDE SERPL-MCNC: 1.27 NG/ML (ref 0.78–5.19)
CHOLEST SERPL-MCNC: 124 MG/DL (ref 120–199)
CHOLEST/HDLC SERPL: 3.9 {RATIO} (ref 2–5)
ESTIMATED AVG GLUCOSE: 120 MG/DL (ref 68–131)
GLUCOSE SERPL-MCNC: 130 MG/DL (ref 70–110)
HBA1C MFR BLD: 5.8 % (ref 4–5.6)
HDLC SERPL-MCNC: 32 MG/DL (ref 40–75)
HDLC SERPL: 25.8 % (ref 20–50)
LDLC SERPL CALC-MCNC: 77 MG/DL (ref 63–159)
NONHDLC SERPL-MCNC: 92 MG/DL
TRIGL SERPL-MCNC: 75 MG/DL (ref 30–150)

## 2024-11-08 PROCEDURE — 82947 ASSAY GLUCOSE BLOOD QUANT: CPT | Performed by: NURSE PRACTITIONER

## 2024-11-08 PROCEDURE — 86341 ISLET CELL ANTIBODY: CPT | Performed by: NURSE PRACTITIONER

## 2024-11-08 PROCEDURE — 83036 HEMOGLOBIN GLYCOSYLATED A1C: CPT | Performed by: NURSE PRACTITIONER

## 2024-11-08 PROCEDURE — 80061 LIPID PANEL: CPT | Performed by: NURSE PRACTITIONER

## 2024-11-08 PROCEDURE — 84681 ASSAY OF C-PEPTIDE: CPT | Performed by: NURSE PRACTITIONER

## 2024-11-13 LAB — GAD65 AB SER-SCNC: 2.22 NMOL/L

## 2024-11-15 ENCOUNTER — OFFICE VISIT (OUTPATIENT)
Dept: ENDOCRINOLOGY | Facility: CLINIC | Age: 34
End: 2024-11-15
Payer: COMMERCIAL

## 2024-11-15 DIAGNOSIS — E13.9 LADA (LATENT AUTOIMMUNE DIABETES IN ADULTS), MANAGED AS TYPE 2: Primary | ICD-10-CM

## 2024-11-15 DIAGNOSIS — E78.1 HYPERTRIGLYCERIDEMIA: ICD-10-CM

## 2024-11-15 RX ORDER — INSULIN GLARGINE-YFGN 100 [IU]/ML
INJECTION, SOLUTION SUBCUTANEOUS
Start: 2024-11-15

## 2024-11-15 RX ORDER — DULAGLUTIDE 3 MG/.5ML
3 INJECTION, SOLUTION SUBCUTANEOUS
Qty: 4 PEN | Refills: 3 | Status: SHIPPED | OUTPATIENT
Start: 2024-11-15 | End: 2025-11-15

## 2024-11-15 NOTE — PROGRESS NOTES
CC: This 33 y.o. White female  is here for evaluation of  DM along with comorbidities indicated in the Visit Diagnosis section of this encounter.    HPI: Geni Brownlee was diagnosed with RYAN in Nov 2023.  Metformin started at time of dx.  CARMELINA antibody elevated at 2.23 but c-peptide was detectable. MDI started very soon after dx d/t mild elevation in beta hydroxybutyric acid, per e-consult from Dr. Wilson.     DM COMPLICATIONS: none        Prior visit 8/16/24  A1c up a bit from 5.6% in March to 5.9% in July.   90 day CGM average 133.   She has lost 5 lb since lov.   Pt went on vacation last month and then got COVID. BGs have been labile with highs/lows since then. She reports generally high BGs follow overcorrection of hypoglycemia.   She's interested in weekly injection versus metformin because she doesn't always remember metformin in the mornings. Knows her work schedule will be more hectic into the holiday months.   Plan Reduce Semglee from 17 to 12 units once daily.   Start Trulicity 0.75 mg once weekly for 4 weeks.   Reduce metformin ER from 1000 mg twice daily to 1000 mg once daily at night. Stop morning dose.   Then increase to Trulicity 1.5 mg once weekly and STOP metformin.   If glucoses drop less than 80 often, reduce from 12 to 10 units once daily. Contact office if still having lows.   Return to clinic in 3 months with labs prior.         Interval hx virtual visit   A1c is about the same from 5.9 to 5.8%.   She has stopped metformin and started Trulicity. Likes Trulicity. Nausea resolved and now tolerating it well. Denies n/v, constipation, or diarrhea.   She reduced Semglee to 10 units daily.   90 day CGM average 133  She has lost 16 lb since lov. Appetite is lower. Has cut out all alcohol and avoids late night eating. She is meal planning and portions out carb intake. She feels like a different person.     Work is getting busier with holiday season and she is eating out more often than usual.          LAST DIABETES EDUCATION: 12/2023, 1/2024, 7/2024    HOSPITALIZED FOR DIABETES  -  No.    SIGNIFICANT DIABETES MED HISTORY:   Basal/bolus insulin regimen (based on carb counting) changed to basal + SS at initial ov 12/2023       PRESCRIBED DIABETES MEDICATIONS:   Trulicity 1.5 mg weekly   Semglee 10 units qhs     Lyumjev pc based on ss: 150-200=+2, 201-250=+4; 251-300=+6; 301-350=+8, over 350=+10 units      Misses medication doses - denies   Rotates injections: abdomen   Changes insulin pen needles: yes      SELF MONITORING BLOOD GLUCOSE: Dexcom G7 CGM clay     CGM interpretation: BGs overall controlled. Hyperglycemia secondary to diet. Mild hypoglycemia d/t basal insulin.               HYPOGLYCEMIC EPISODES: symptoms start in the 60s. -- shaky   Corrects with gummy bears or hard candy.      CURRENT DIET: tries to eat fairly healthy with good amount of fresh produce most of the time.   Eats 3 meals/day. Breakfast is smallest meal.       CURRENT EXERCISE: none formal     SOCIAL: works at [employer]       There were no vitals taken for this visit.    ROS:   CONSTITUTIONAL: Appetite good, denies fatigue  GI: No nausea, vomiting, constipation; no diarrhea       PHYSICAL EXAM:  GENERAL: Well developed, well nourished. No acute distress.   PSYCH: AAOx3, appropriate mood and affect, conversant, well-groomed. Judgement and insight good.   NEURO: Cranial nerves grossly intact. Speech clear.   CHEST: Respirations even and unlabored.       Hemoglobin A1C   Date Value Ref Range Status   11/08/2024 5.8 (H) 4.0 - 5.6 % Final     Comment:     ADA Screening Guidelines:  5.7-6.4%  Consistent with prediabetes  >or=6.5%  Consistent with diabetes    High levels of fetal hemoglobin interfere with the HbA1C  assay. Heterozygous hemoglobin variants (HbS, HgC, etc)do  not significantly interfere with this assay.   However, presence of multiple variants may affect accuracy.     07/25/2024 5.9 (H) 4.0 - 5.6 % Final     Comment:     ADA  Screening Guidelines:  5.7-6.4%  Consistent with prediabetes  >or=6.5%  Consistent with diabetes    High levels of fetal hemoglobin interfere with the HbA1C  assay. Heterozygous hemoglobin variants (HbS, HgC, etc)do  not significantly interfere with this assay.   However, presence of multiple variants may affect accuracy.     03/15/2024 5.6 4.0 - 5.6 % Final     Comment:     ADA Screening Guidelines:  5.7-6.4%  Consistent with prediabetes  >or=6.5%  Consistent with diabetes    High levels of fetal hemoglobin interfere with the HbA1C  assay. Heterozygous hemoglobin variants (HbS, HgC, etc)do  not significantly interfere with this assay.   However, presence of multiple variants may affect accuracy.         Lab Results   Component Value Date    CPEPTIDE 1.27 11/08/2024    GLUTAMICACID 2.22 (H) 11/08/2024    ISLETCELLANT <1:4 11/08/2023        Lab Results   Component Value Date    CHOL 124 11/08/2024    CHOL 149 11/07/2023    CHOL 147 07/09/2020     Lab Results   Component Value Date    HDL 32 (L) 11/08/2024    HDL 24 (L) 11/07/2023    HDL 38 (L) 07/09/2020     Lab Results   Component Value Date    LDLCALC 77.0 11/08/2024    LDLCALC 73.2 11/07/2023    LDLCALC 84.0 07/09/2020     Lab Results   Component Value Date    TRIG 75 11/08/2024    TRIG 259 (H) 11/07/2023    TRIG 125 07/09/2020     Lab Results   Component Value Date    CHOLHDL 25.8 11/08/2024    CHOLHDL 16.1 (L) 11/07/2023    CHOLHDL 25.9 07/09/2020           Component Value Date/Time     (L) 11/07/2023 1008    K 4.2 11/07/2023 1008     11/07/2023 1008    CO2 16 (L) 11/07/2023 1008    BUN 12 11/07/2023 1008    CREATININE 1.1 11/07/2023 1008     (H) 11/07/2023 1008    CALCIUM 9.3 11/07/2023 1008    ALKPHOS 57 11/07/2023 1008    AST 24 11/07/2023 1008    ALT 31 11/07/2023 1008    BILITOT 0.5 11/07/2023 1008    EGFRNORACEVR >60 11/07/2023 1008    ESTGFRAFRICA >60 01/11/2022 1522         Lab Results   Component Value Date    LABMICR 6.0 03/15/2024     CREATRANDUR 144.0 03/15/2024    MICALBCREAT 4.2 03/15/2024             ASSESSMENT and PLAN:    A1C GOAL: < 6.5%     1. RYAN (latent autoimmune diabetes in adults), managed as type 2  Increase Trulicity to 3 mg weekly. Reviewed side effects.   Reduce Lantus to 7 units once daily. If blood sugars still dropping less than 80, then stop Lantus.   Continue Lyumjev correction scale.     Return to clinic in 3 months with labs     Hemoglobin A1C    Microalbumin/Creatinine Ratio, Urine      2. Hypertriglyceridemia  Triglycerides improved, now at goal              Orders Placed This Encounter   Procedures    Hemoglobin A1C     Standing Status:   Future     Standing Expiration Date:   1/14/2026     Order Specific Question:   Send normal result to authorizing provider's In Basket if patient is active on MyChart:     Answer:   Yes    Microalbumin/Creatinine Ratio, Urine     Standing Status:   Future     Standing Expiration Date:   1/14/2026     Order Specific Question:   Specimen Source     Answer:   Urine        Follow up in about 3 months (around 2/15/2025).       The patient location is: [place]   The chief complaint leading to consultation is: type 2 DM     Visit type: audiovisual    Face to Face time with patient: 20 minutes of total time spent on the encounter, which includes face to face time and non-face to face time preparing to see the patient (eg, review of tests), Obtaining and/or reviewing separately obtained history, Documenting clinical information in the electronic or other health record, Independently interpreting results (not separately reported) and communicating results to the patient/family/caregiver, or Care coordination (not separately reported).         Each patient to whom he or she provides medical services by telemedicine is:  (1) informed of the relationship between the physician and patient and the respective role of any other health care provider with respect to management of the patient; and  (2) notified that he or she may decline to receive medical services by telemedicine and may withdraw from such care at any time.    Notes:

## 2024-11-15 NOTE — PATIENT INSTRUCTIONS
Increase Trulicity to 3 mg weekly. Reviewed side effects.   Reduce Lantus to 7 units once daily. If blood sugars still dropping less than 80, then stop Lantus.   Continue Lyumjev correction scale.     Return to clinic in 3 months with labs prior.     Pre-op instructions: hold Trulicity 1 week prior to surgery and no insulin the night prior.

## 2024-11-20 DIAGNOSIS — E13.9 LADA (LATENT AUTOIMMUNE DIABETES IN ADULTS), MANAGED AS TYPE 1: ICD-10-CM

## 2024-12-12 ENCOUNTER — CLINICAL SUPPORT (OUTPATIENT)
Dept: DIABETES | Facility: CLINIC | Age: 34
End: 2024-12-12
Payer: COMMERCIAL

## 2024-12-12 DIAGNOSIS — E13.9 LADA (LATENT AUTOIMMUNE DIABETES IN ADULTS), MANAGED AS TYPE 1: Primary | ICD-10-CM

## 2024-12-12 PROCEDURE — 99999 PR PBB SHADOW E&M-EST. PATIENT-LVL II: CPT | Mod: PBBFAC,,, | Performed by: DIETITIAN, REGISTERED

## 2024-12-12 PROCEDURE — G0108 DIAB MANAGE TRN  PER INDIV: HCPCS | Mod: S$GLB,,, | Performed by: DIETITIAN, REGISTERED

## 2024-12-16 NOTE — PROGRESS NOTES
"Diabetes Care Specialist Progress Note  Author: Yari Guthrie RD, CDE  Date: 12/16/2024    Intake    Program Intake  Reason for Diabetes Program Visit:: Post Program Follow-Up  Type of Intervention:: Individual  Individual: Education  Education: Self-Management Skill Review  Type of Follow-Up: 6 month  Current diabetes risk level:: low  In the last month, have you used the ER or been admitted to the hospital: No    Current Diabetes Treatment: DM Injectables, Insulin  DM Injectables Type/Dose: Trulicity 3mg weekly  Method of insulin delivery?: Injections  Injection Type: Pens  Pen Type/Dose: Semglee 7 units HS, Humalog correction only    Continuous Glucose Monitoring  Patient has CGM: Yes  Personal CGM type:: Dexcom G6    Lab Results   Component Value Date    HGBA1C 5.8 (H) 11/08/2024       Weight: (P) 69.7 kg (153 lb 12.3 oz)   Height: (P) 5' 4" (162.6 cm)   Body mass index is 26.39 kg/m² (pended).        Today's interventions were provided through individual discussion, instruction, and written materials were provided.      Patient verbalized understanding of instruction and written materials.  Pt was able to return back demonstration of instructions today. Patient understood key points, needs reinforcement and further instruction.     Diabetes Self-Management Care Plan:    Today's Diabetes Self-Management Care Plan was developed with Geni's input. Geni has agreed to work toward the following goal(s) to improve his/her overall diabetes control.      Care Plan: Diabetes Management   Updates made since 12/17/2023 12:00 AM        Problem: Medications         Goal: Will take MDI as prescribed. Completed 7/25/2024   Start Date: 11/17/2023   Expected End Date: 12/17/2023   Recent Progress: Met   Priority: High   Barriers: No Barriers Identified   Note:    11/17/23 - Pt is newly dx with RYAN and is new to MDI. Started taking Humalog yesterday. Has seen dramatic improvement in glucose. 136 this morning and " "lowest yesterday was 88. She verbalized appropriate injection technique. Reviewed site selection and rotating sites. However, she has been taking Humalog after eating. Educated on action time of Humalog and instructed to take 5-15 min before each meal, skip Humalog if skipping meal and do not take extra Humalog between meals if <4 hours since last shot. Also educated on titrating Humalog for low carb meal and correcting highs. She also thought she couldn't take Humalog and Semglee close together - thought she needed to skip Humalog at dinner if eating around the same time she takes Semglee. Explained how they work differently and okay to still take Humalog with dinner. Pt verbalized understanding of all. She is very interested in getting on insulin pump therapy as soon as possible. Has a family member who has had a great experience with pump. Discussed briefly advances in pump options, insurance often requires 6 months on injections prior to approval of pump therapy but can vary depending on plan, and that out of pocket with commercial insurance can often be ~20% of cost of device. She recently changed her plan to one that covers diabetes devices well and feels cost will not be a barrier. She has appt with Alexandrea Lewis NP in a couple of months. Encouraged pt to start working on identifying and counting carbs now.     12/14/23 - Pt is consistently taking Humalog 15 min before each meal and Semglee every night. She is occasionally having lows. Discussed "honeymoon phase" and pancreas still producing some insulin although not enough. Pt reports lows are typically at work and feels they are more r/t taking full 6 unit Humalog dose with lower carb meal. Discussed cutting dose in 1/2 and only taking 3 units with lower carb meal. However, pt states she is a very literal person and doesn't like guessing at dosage. Discussed using insulin to carb ratio would be a much more specific way of calculating dose. Pt is very open " to changing to ICR. Sent message to Dr. Wilson for advise. Pt is also still very interested in potentially getting insulin pump as soon as possible. She does not think tubing would work for her - feels would rip out tubing often - her cousin has OmniPod5 and she has been looking up [employer] videos about it. Briefly demonstrated use with simulator clay and discussed would do complete training if ordered by endocrine provider (seeing Alexandrea Lewis NP in Jan). Encouraged pt to discuss further with Alexandrea at that appt and scheduled follow-up DE after.     1/25/24 - Pt had visit with Alexandrea Lewis NP and has been able to reduce insulin to Lyumjev per scale only and continuing Semglee 20 units HS. Also taking Metformin 1000mg BID. Pt has been doing very well with this regimen and taking consistently. Dexcom report shows 90% TIR with very few lows.        Problem: Healthy Eating         Goal: Eat 30-45g/2-3 servings of Carbohydrate per meal. Completed 7/25/2024   Start Date: 11/17/2023   Expected End Date: 12/17/2023   Recent Progress: Met   Priority: Medium   Barriers: No Barriers Identified   Note:    11/17/23 - Has been doing her own research online regarding diabetes. Reviewed beware of misinformation online, particularly regarding diabetes nutrition - encourage credible sources. She shows some understanding of what foods raise BG but unsure about some foods. Provided education on sources of carbohydrate, choosing more complex/high fiber carbs vs simple carbs, portion sizes using dry measuring cups and food models for visual aid, label reading, and balancing meals with lean protein and non-starchy vegetables. Reviewed meal planning, plate method, and went over some examples.    12/14/23 - Has been checking labels, choosing more complex carbs, swapping for lower carb snacks, and avoiding sugary beverages. Has been doing basic carb counting but unsure about some foods that do not have labels. Reviewed portion sizes for 15g  CHO and adding up carbs at meals. Also explained subtracting for high fiber choices. Reviewed multiple examples. Pt caught on quickly and shows good understanding.     1/25/24 - Has relaxed a little on self-restrictions (pt was at times overly restrictive and avoiding carbs). Now is allowing herself small portions of carb and balancing meals appropriately with protein, complex carb and vegetables. Applauded efforts and encouraged continuing. She expressed feels she is following habits that are much more sustainable.     12/12/24 - Appetite significantly decreased with Trulicity. Reports sometimes goes long periods without eating and eating much smaller meals. Having less low BG as well, which is helping her keep from needing to snack. She continues to allow herself small portions of higher carb foods but limits to occasional treats. Overall, sticking to balanced meals with protein, vegetable, and small amt starch. Discussed importance of getting in enough protein with being on GLP to avoid losing muscle mass. Encouraged 70-80g protein daily or roughly three 3oz portions of protein. She states she is definitely not getting that much protein in. Reviewed and encouraged some ways of increasing protein intake.         Follow Up Plan     Follow up annually or sooner if A1C increases.    Today's care plan and follow up schedule was discussed with patient.  Geni verbalized understanding of the care plan, goals, and agrees to follow up plan.        The patient was encouraged to communicate with his/her health care provider/physician and care team regarding his/her condition(s) and treatment.  I provided the patient with my contact information today and encouraged to contact me via phone or [facility] Patient Portal as needed.     Length of Visit   Total Time: 45 Minutes

## 2025-01-16 ENCOUNTER — PATIENT MESSAGE (OUTPATIENT)
Dept: ENDOCRINOLOGY | Facility: CLINIC | Age: 35
End: 2025-01-16
Payer: COMMERCIAL

## 2025-01-16 RX ORDER — DULAGLUTIDE 3 MG/.5ML
3 INJECTION, SOLUTION SUBCUTANEOUS
Qty: 12 PEN | Refills: 2 | Status: SHIPPED | OUTPATIENT
Start: 2025-01-16 | End: 2026-01-16

## 2025-01-31 DIAGNOSIS — M25.561 RIGHT KNEE PAIN, UNSPECIFIED CHRONICITY: Primary | ICD-10-CM

## 2025-02-07 ENCOUNTER — OFFICE VISIT (OUTPATIENT)
Dept: ORTHOPEDICS | Facility: CLINIC | Age: 35
End: 2025-02-07
Attending: ORTHOPAEDIC SURGERY
Payer: COMMERCIAL

## 2025-02-07 DIAGNOSIS — M23.91 INTERNAL DERANGEMENT OF RIGHT KNEE: ICD-10-CM

## 2025-02-07 DIAGNOSIS — M25.561 RIGHT KNEE PAIN, UNSPECIFIED CHRONICITY: Primary | ICD-10-CM

## 2025-02-07 PROCEDURE — 99213 OFFICE O/P EST LOW 20 MIN: CPT | Mod: S$GLB,,, | Performed by: ORTHOPAEDIC SURGERY

## 2025-02-07 PROCEDURE — 1159F MED LIST DOCD IN RCRD: CPT | Mod: CPTII,S$GLB,, | Performed by: ORTHOPAEDIC SURGERY

## 2025-02-07 PROCEDURE — 99999 PR PBB SHADOW E&M-EST. PATIENT-LVL III: CPT | Mod: PBBFAC,,, | Performed by: ORTHOPAEDIC SURGERY

## 2025-02-07 NOTE — PROGRESS NOTES
EST PATIENT ORTHOPAEDIC: Knee    PRIMARY CARE PHYSICIAN: Marine Bauman MD   REFERRING PROVIDER: Tom Oglesby MD  [address] [place] [address]  [place] [id number]     ASSESSMENT & PLAN:    Impression:  Right Knee Lateral meniscal tear     Follow Up Plan: Will Call with MR results.     Non operative care:    Geni Brownlee has physical exam evidence of above and wishes to pursue an non-operative care. I am recommending the following: injection and MRI.       To review: Patient with a 5 month history of right knee pain this was associated with an episode where she was in flexed knee position petting a dog and felt a strain in her knee.  She does not recall sensation of any popping however does have clicking and catching after this event.  She was unable to place significant amount of weight on her leg and noticed some delayed swelling.  She presented to emergency room where radiographs were negative and she was given Celebrex and asked to follow-up with orthopedics.  Previously when I saw her, she reports that she is now able to the ambulate on her leg.  Not using any braces or crutches.  Still notice some intermittent swelling but this is primarily after being on her feet most of the day.  She has been icing and elevating and compressing. We tried a Medrol Dosepak for the residual swelling and pain. She has had more episodes of the same pain over the last few months. Will do repeat injection and get MR to confirm meniscal tear for consideration of arthroscopic intervention.      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 female here for evaluation and management of right knee pain. There is a specific incident that brought about this pain. she has had progressive problems with the knee(s)  starting 1 weeks ago but is now progressing to interfere with activities which include: enjoying hobbies and standing for prolonged periods of time    Currently the pain in the joint is rated at moderate with activity. The pain is constant and is located in the knee, at level of joint line and located laterally and anterior The pain is described as aching, sharp, stiffness and throbbing. Relieving factors include rest, ice or heat and prescription medication.     There is associated Clicking.     Geni Brownlee has no additional complaints.     11/7/22; Here for follow up. On going knee pains now chronic. Multiple events with increased swelling, inability to weight bear, needing crutches. Previous Medrol dose pack without long term relief.     2/7/25:  Patient comes in today with ongoing right knee pain.  Previously saw her and was going to obtain an MRI but insurance did not approve.  She has had recurrent bouts of intermittent locking and pain primarily localized to the lateral aspect of her knee.  She works in retail.  She is unable to stand for prolonged periods of time without having pain.  She feels she can not straighten her leg out fully.  Last injection we did did help unlock the knee and provided some pain relief for short period of time.    PROGRESSIVE SYMPTOMS:  Pain impacting work  Pain worsened by weight bearing    FUNCTIONAL STATUS:   Participate in recreational activities     PREVIOUS TREATMENTS:  Medical: RX NSAIDS and Narcotics  Physical Therapy: Activities Modified   Previous Orthopaedic Surgery: None    REVIEW OF SYSTEMS:  PAIN ASSESSMENT:  See HPI.  MUSCULOSKELETAL: See HPI.  OTHER 10 point review of systems is negative except as stated in HPI above    PAST MEDICAL HISTORY   has a past medical history of Depression.     PAST SURGICAL HISTORY   has no past surgical history on file.     FAMILY HISTORY  family history includes Breast cancer in her maternal aunt; No Known Problems in her father and  "mother.     SOCIAL HISTORY   reports that she has been smoking cigarettes. She has never used smokeless tobacco. She reports current alcohol use. She reports that she does not use drugs.     ALLERGIES   Review of patient's allergies indicates:   Allergen Reactions    Fish oil Itching     Allergic to seafood        MEDICATIONS  Current Outpatient Medications on File Prior to Visit   Medication Sig Dispense Refill    blood sugar diagnostic Strp To check BG 3 times daily, to use with insurance preferred meter 100 each 0    blood-glucose sensor (DEXCOM G7 SENSOR) Brie Change every 10 days 12 each 3    dulaglutide (TRULICITY) 3 mg/0.5 mL pen injector Inject 3 mg into the skin every 7 days. 12 pen 2    insulin glargine-yfgn (SEMGLEE,INSULIN GLARG-YFGN,PEN) 100 unit/mL (3 mL) InPn Inject 7 units once daily      insulin lispro-aabc 100 unit/mL pen Use as needed with correction scale: 150-200=+2, 201-250=+4; 251-300=+6; 301-350=+8, over 350=+10 units; max daily dose 25 units 5 Pen 3    lancets Misc To check BG 3 times daily, to use with insurance preferred meter 100 each 5    levocetirizine (XYZAL) 5 MG tablet TAKE 1 TABLET(5 MG) BY MOUTH EVERY EVENING 30 tablet 2    pen needle, diabetic (BD ULTRA-FINE SHORT PEN NEEDLE) 31 gauge x 5/16" Ndle use with insulin pens 100 each 1    blood-glucose meter kit To check BG 3 times daily, to use with insurance preferred meter 1 each 0    HYDROcodone-acetaminophen (NORCO)  mg per tablet Take 1 tablet by mouth. (Patient not taking: Reported on 2/7/2025)       Current Facility-Administered Medications on File Prior to Visit   Medication Dose Route Frequency Provider Last Rate Last Admin    levonorgestreL (MIRENA) 20 mcg/24 hours (7 yrs) 52 mg IUD 1 Intra Uterine Device  1 Intra Uterine Device Intrauterine  Candi James MD   1 Intra Uterine Device at 02/24/22 1500          PHYSICAL EXAM   vitals were not taken for this visit.   There is no height or weight on file to calculate " BMI.      All other systems deferred.  GENERAL:  No acute distress  HABITUS: Obese  GAIT: Non-antalgic  SKIN: Normal     KNEE EXAM:    right:   Effusion: Small joint effusion  TTP: yes over Lateral Joint Line and biceps  no crepitus with passive knee ROM  Passive ROM: Extension 0, Flexion 130  No pain with manipulation of patella  Stable to varus/valgus stress. No increased laxity to anterior/posterior drawer testing  negative Viet's test  No pain with IR/ER rotation of the hip  5/5 strength in knee flexion and extension, ankle plantarflexion and dorsiflexion  Neurovascular Status: Sensation intact to light touch in Sural, Saphenous, SPN, DPN, Tibial nerve distribution  2+ pulse DP/PT, normal capillary refill, foot has normal warmth    DATA:  Diagnostic tests reviewed for today's visit:     The obtained knee radiographs appears normal for age. There is good alignment with no evidence of fracture, bony abnormalities or signficant degenerative changes.

## 2025-03-24 ENCOUNTER — OFFICE VISIT (OUTPATIENT)
Dept: FAMILY MEDICINE | Facility: CLINIC | Age: 35
End: 2025-03-24
Payer: COMMERCIAL

## 2025-03-24 VITALS
TEMPERATURE: 98 F | OXYGEN SATURATION: 99 % | HEIGHT: 64 IN | RESPIRATION RATE: 18 BRPM | DIASTOLIC BLOOD PRESSURE: 66 MMHG | SYSTOLIC BLOOD PRESSURE: 92 MMHG | HEART RATE: 83 BPM | WEIGHT: 139.13 LBS | BODY MASS INDEX: 23.75 KG/M2

## 2025-03-24 DIAGNOSIS — H92.01 RIGHT EAR PAIN: Primary | ICD-10-CM

## 2025-03-24 DIAGNOSIS — R09.81 NASAL CONGESTION: ICD-10-CM

## 2025-03-24 PROCEDURE — 3074F SYST BP LT 130 MM HG: CPT | Mod: CPTII,S$GLB,, | Performed by: INTERNAL MEDICINE

## 2025-03-24 PROCEDURE — 99999 PR PBB SHADOW E&M-EST. PATIENT-LVL IV: CPT | Mod: PBBFAC,,, | Performed by: INTERNAL MEDICINE

## 2025-03-24 PROCEDURE — 1159F MED LIST DOCD IN RCRD: CPT | Mod: CPTII,S$GLB,, | Performed by: INTERNAL MEDICINE

## 2025-03-24 PROCEDURE — 1160F RVW MEDS BY RX/DR IN RCRD: CPT | Mod: CPTII,S$GLB,, | Performed by: INTERNAL MEDICINE

## 2025-03-24 PROCEDURE — 3008F BODY MASS INDEX DOCD: CPT | Mod: CPTII,S$GLB,, | Performed by: INTERNAL MEDICINE

## 2025-03-24 PROCEDURE — 3078F DIAST BP <80 MM HG: CPT | Mod: CPTII,S$GLB,, | Performed by: INTERNAL MEDICINE

## 2025-03-24 PROCEDURE — 99213 OFFICE O/P EST LOW 20 MIN: CPT | Mod: S$GLB,,, | Performed by: INTERNAL MEDICINE

## 2025-03-24 RX ORDER — NAPROXEN 375 MG/1
1 TABLET ORAL 2 TIMES DAILY
COMMUNITY
Start: 2024-12-06 | End: 2025-03-24

## 2025-03-24 RX ORDER — DICLOFENAC SODIUM 75 MG/1
75 TABLET, DELAYED RELEASE ORAL 2 TIMES DAILY
COMMUNITY
Start: 2025-03-23

## 2025-03-24 RX ORDER — AZELASTINE 1 MG/ML
1 SPRAY, METERED NASAL 2 TIMES DAILY
Qty: 30 ML | Refills: 0 | Status: SHIPPED | OUTPATIENT
Start: 2025-03-24

## 2025-03-24 RX ORDER — IBUPROFEN 800 MG/1
800 TABLET ORAL EVERY 6 HOURS PRN
COMMUNITY
Start: 2025-01-15 | End: 2025-03-24

## 2025-03-24 RX ORDER — FLUTICASONE PROPIONATE 50 MCG
1 SPRAY, SUSPENSION (ML) NASAL DAILY
Qty: 16 G | Refills: 0 | Status: SHIPPED | OUTPATIENT
Start: 2025-03-24

## 2025-03-24 RX ORDER — METHOCARBAMOL 500 MG/1
TABLET, FILM COATED ORAL
COMMUNITY
Start: 2024-12-06 | End: 2025-03-24

## 2025-03-24 RX ORDER — AMOXICILLIN 875 MG/1
875 TABLET, FILM COATED ORAL EVERY 12 HOURS
COMMUNITY
Start: 2025-03-20 | End: 2025-03-24

## 2025-03-24 NOTE — ASSESSMENT & PLAN NOTE
- Explained pt possible causes of ear pain. Discussed that fluid collection behind the ear can be due to blockage in nose.  - Told pt to stop taking amoxicillin as her sx do not appear to have an infectious etiology  - F/u in 2 weeks if no relief

## 2025-03-24 NOTE — PROGRESS NOTES
"Chief Complaint: Otalgia (Pt states Wednesday night she felt intense ear pressure and has since been seeing double )      Geni Brownlee  is a 34 y.o. year old patient who presents today for     Woke up at 3am Wednesday night with intense "pressure" in her R ear.    Saw virtual visit Thursday, 'ed with tooth infection. Was given Amoxicillin, pt has been taking now on day 5 of therapy.   Pt says this has not helped with ear pressure.    Denies tooth pain currently.    Yesterday pt says the pressure got so bad that she started seeing double. Endorses nausea during episodes. Endorses feeling of fluid in R ear    Reports hx of tympanostomy tube placement in b/l ears as an infant for 6 months. Denies recent hx of ear infections.    Pt says her symptoms are temporarily relieved by ibuprofen.    Denies throat, sinus pain, recent illness, vomiting    Past Medical History:   Diagnosis Date    Depression        History reviewed. No pertinent surgical history.     Family History   Problem Relation Name Age of Onset    No Known Problems Mother      No Known Problems Father      Breast cancer Maternal Aunt      Colon cancer Neg Hx      Ovarian cancer Neg Hx          Social History     Socioeconomic History    Marital status: Single   Tobacco Use    Smoking status: Every Day     Current packs/day: 0.00     Types: Cigarettes     Last attempt to quit: 2020     Years since quittin.6    Smokeless tobacco: Never   Substance and Sexual Activity    Alcohol use: Yes    Drug use: No    Sexual activity: Yes     Partners: Male     Birth control/protection: None     Social Drivers of Health     Financial Resource Strain: Medium Risk (2025)    Overall Financial Resource Strain (CARDIA)     Difficulty of Paying Living Expenses: Somewhat hard   Food Insecurity: Unknown (2025)    Hunger Vital Sign     Worried About Running Out of Food in the Last Year: Patient declined     Ran Out of Food in the Last Year: Never true   Recent " Concern: Food Insecurity - Food Insecurity Present (11/8/2024)    Received from [facility]    Hunger Vital Sign     Worried About Running Out of Food in the Last Year: Sometimes true     Ran Out of Food in the Last Year: Patient declined   Transportation Needs: No Transportation Needs (11/8/2024)    Received from [facility]    PRAPARE - Transportation     Lack of Transportation (Medical): No     Lack of Transportation (Non-Medical): No   Physical Activity: Sufficiently Active (2/6/2025)    Exercise Vital Sign     Days of Exercise per Week: 5 days     Minutes of Exercise per Session: 60 min   Stress: No Stress Concern Present (2/6/2025)    [employer] - Occupational Stress Questionnaire     Feeling of Stress : Only a little   Housing Stability: High Risk (2/6/2025)    Housing Stability Vital Sign     Unable to Pay for Housing in the Last Year: Yes       Current Medications[1]     Review of Systems   Constitutional:  Negative for chills, fever and weight loss.   HENT:  Positive for ear pain. Negative for ear discharge, sinus pain and sore throat.    Eyes:  Positive for double vision. Negative for pain.   Respiratory:  Negative for cough and shortness of breath.    Cardiovascular:  Negative for chest pain and palpitations.   Gastrointestinal:  Positive for nausea. Negative for abdominal pain, diarrhea and vomiting.   Genitourinary:  Negative for dysuria, frequency and urgency.   Musculoskeletal:  Negative for back pain and neck pain.   Skin:  Negative for rash.   Neurological:  Negative for dizziness and headaches.   Psychiatric/Behavioral:  The patient is not nervous/anxious.         Objective:      Vitals:    03/24/25 0855   BP: 92/66   Pulse: 83   Resp: 18   Temp: 97.8 °F (36.6 °C)       Physical Exam  Constitutional:       General: She is not in acute distress.     Appearance: Normal appearance. She is not toxic-appearing.   HENT:      Head: Normocephalic and atraumatic.      Right  Ear: Ear canal and external ear normal. There is no impacted cerumen.      Left Ear: Tympanic membrane, ear canal and external ear normal. There is no impacted cerumen.      Ears:      Comments: Small collection of fluid behind TM on Right side     Nose: Congestion present. No rhinorrhea.      Comments: Turbinate swelling R>L     Mouth/Throat:      Mouth: Mucous membranes are moist.      Pharynx: Oropharynx is clear. No oropharyngeal exudate or posterior oropharyngeal erythema.   Eyes:      General: No scleral icterus.        Right eye: No discharge.         Left eye: No discharge.   Cardiovascular:      Rate and Rhythm: Normal rate and regular rhythm.      Pulses: Normal pulses.      Heart sounds: Normal heart sounds. No murmur heard.     No gallop.   Pulmonary:      Effort: Pulmonary effort is normal. No respiratory distress.   Abdominal:      General: There is no distension.      Palpations: Abdomen is soft.      Tenderness: There is no abdominal tenderness. There is no guarding.   Musculoskeletal:         General: No swelling or tenderness.      Cervical back: Normal range of motion and neck supple. No rigidity.      Right lower leg: No edema.      Left lower leg: No edema.   Lymphadenopathy:      Cervical: No cervical adenopathy.   Skin:     General: Skin is warm.      Coloration: Skin is not jaundiced.      Findings: No rash.   Neurological:      General: No focal deficit present.      Mental Status: She is alert and oriented to person, place, and time.   Psychiatric:         Mood and Affect: Mood normal.          Assessment:       1. Right ear pain    2. Nasal congestion          Plan:   1. Right ear pain  Assessment & Plan:  - Explained pt possible causes of ear pain. Discussed that fluid collection behind the ear can be due to blockage in nose.  - Told pt to stop taking amoxicillin as her sx do not appear to have an infectious etiology  - F/u in 2 weeks if no relief    Orders:  -     fluticasone propionate  "(FLONASE) 50 mcg/actuation nasal spray; 1 spray (50 mcg total) by Each Nostril route once daily.  Dispense: 16 g; Refill: 0  -     azelastine (ASTELIN) 137 mcg (0.1 %) nasal spray; 1 spray (137 mcg total) by Nasal route 2 (two) times daily.  Dispense: 30 mL; Refill: 0    2. Nasal congestion  Assessment & Plan:  Likely the cause of right ear pressure  - Start fluticasone and azelastine nasal sprays  - educated pt on how to use the sprays  - f/up if unresolved in 2-3 weeks    Orders:  -     fluticasone propionate (FLONASE) 50 mcg/actuation nasal spray; 1 spray (50 mcg total) by Each Nostril route once daily.  Dispense: 16 g; Refill: 0  -     azelastine (ASTELIN) 137 mcg (0.1 %) nasal spray; 1 spray (137 mcg total) by Nasal route 2 (two) times daily.  Dispense: 30 mL; Refill: 0           [1]   Current Outpatient Medications:     blood sugar diagnostic Strp, To check BG 3 times daily, to use with insurance preferred meter, Disp: 100 each, Rfl: 0    blood-glucose meter kit, To check BG 3 times daily, to use with insurance preferred meter, Disp: 1 each, Rfl: 0    blood-glucose sensor (DEXCOM G7 SENSOR) Brie, Change every 10 days, Disp: 12 each, Rfl: 3    diclofenac (VOLTAREN) 75 MG EC tablet, Take 75 mg by mouth 2 (two) times daily., Disp: , Rfl:     dulaglutide (TRULICITY) 3 mg/0.5 mL pen injector, Inject 3 mg into the skin every 7 days., Disp: 12 pen , Rfl: 2    insulin glargine-yfgn (SEMGLEE,INSULIN GLARG-YFGN,PEN) 100 unit/mL (3 mL) InPn, Inject 7 units once daily, Disp: , Rfl:     insulin lispro-aabc 100 unit/mL pen, Use as needed with correction scale: 150-200=+2, 201-250=+4; 251-300=+6; 301-350=+8, over 350=+10 units; max daily dose 25 units, Disp: 5 Pen, Rfl: 3    lancets Misc, To check BG 3 times daily, to use with insurance preferred meter, Disp: 100 each, Rfl: 5    pen needle, diabetic (BD ULTRA-FINE SHORT PEN NEEDLE) 31 gauge x 5/16" Ndle, use with insulin pens, Disp: 100 each, Rfl: 1    azelastine (ASTELIN) " 137 mcg (0.1 %) nasal spray, 1 spray (137 mcg total) by Nasal route 2 (two) times daily., Disp: 30 mL, Rfl: 0    fluticasone propionate (FLONASE) 50 mcg/actuation nasal spray, 1 spray (50 mcg total) by Each Nostril route once daily., Disp: 16 g, Rfl: 0    Current Facility-Administered Medications:     levonorgestreL (MIRENA) 20 mcg/24 hours (7 yrs) 52 mg IUD 1 Intra Uterine Device, 1 Intra Uterine Device, Intrauterine, , Candi James MD, 1 Intra Uterine Device at 02/24/22 1500

## 2025-03-24 NOTE — ASSESSMENT & PLAN NOTE
Likely the cause of right ear pressure  - Start fluticasone and azelastine nasal sprays  - educated pt on how to use the sprays  - f/up if unresolved in 2-3 weeks

## 2025-03-24 NOTE — PROGRESS NOTES
Health Maintenance Due   Topic     Hepatitis C Screening  Consult PCP    Diabetic Eye Exam  Patient has an appt with [facility] [email or web address] this week    Diabetes Urine Screening  Consult PCP    Foot Exam  Consult PCP

## 2025-03-27 ENCOUNTER — PATIENT MESSAGE (OUTPATIENT)
Dept: ENDOCRINOLOGY | Facility: CLINIC | Age: 35
End: 2025-03-27
Payer: COMMERCIAL

## 2025-03-27 DIAGNOSIS — E13.9 LADA (LATENT AUTOIMMUNE DIABETES IN ADULTS), MANAGED AS TYPE 2: ICD-10-CM

## 2025-03-27 RX ORDER — BLOOD-GLUCOSE SENSOR
EACH MISCELLANEOUS
Qty: 12 EACH | Refills: 3 | Status: SHIPPED | OUTPATIENT
Start: 2025-03-27

## 2025-04-21 ENCOUNTER — LAB VISIT (OUTPATIENT)
Dept: LAB | Facility: OTHER | Age: 35
End: 2025-04-21
Attending: STUDENT IN AN ORGANIZED HEALTH CARE EDUCATION/TRAINING PROGRAM
Payer: COMMERCIAL

## 2025-04-21 ENCOUNTER — PATIENT MESSAGE (OUTPATIENT)
Dept: ENDOCRINOLOGY | Facility: CLINIC | Age: 35
End: 2025-04-21
Payer: COMMERCIAL

## 2025-04-21 DIAGNOSIS — E13.9 LADA (LATENT AUTOIMMUNE DIABETES IN ADULTS), MANAGED AS TYPE 2: ICD-10-CM

## 2025-04-21 LAB
EAG (OHS): 157 MG/DL (ref 68–131)
HBA1C MFR BLD: 7.1 % (ref 4–5.6)

## 2025-04-21 PROCEDURE — 83036 HEMOGLOBIN GLYCOSYLATED A1C: CPT

## 2025-04-21 PROCEDURE — 36415 COLL VENOUS BLD VENIPUNCTURE: CPT

## 2025-04-28 ENCOUNTER — OFFICE VISIT (OUTPATIENT)
Dept: ENDOCRINOLOGY | Facility: CLINIC | Age: 35
End: 2025-04-28
Payer: COMMERCIAL

## 2025-04-28 DIAGNOSIS — E13.9 LADA (LATENT AUTOIMMUNE DIABETES IN ADULTS), MANAGED AS TYPE 2: Primary | ICD-10-CM

## 2025-04-28 PROCEDURE — 95251 CONT GLUC MNTR ANALYSIS I&R: CPT | Mod: NDTC,,, | Performed by: NURSE PRACTITIONER

## 2025-04-28 PROCEDURE — 1160F RVW MEDS BY RX/DR IN RCRD: CPT | Mod: CPTII,95,, | Performed by: NURSE PRACTITIONER

## 2025-04-28 PROCEDURE — 3051F HG A1C>EQUAL 7.0%<8.0%: CPT | Mod: CPTII,95,, | Performed by: NURSE PRACTITIONER

## 2025-04-28 PROCEDURE — G2211 COMPLEX E/M VISIT ADD ON: HCPCS | Mod: 95,,, | Performed by: NURSE PRACTITIONER

## 2025-04-28 PROCEDURE — 1159F MED LIST DOCD IN RCRD: CPT | Mod: CPTII,95,, | Performed by: NURSE PRACTITIONER

## 2025-04-28 PROCEDURE — 98006 SYNCH AUDIO-VIDEO EST MOD 30: CPT | Mod: 95,,, | Performed by: NURSE PRACTITIONER

## 2025-04-28 RX ORDER — REPAGLINIDE 1 MG/1
1 TABLET ORAL
Qty: 270 TABLET | Refills: 1 | Status: SHIPPED | OUTPATIENT
Start: 2025-04-28 | End: 2026-04-28

## 2025-04-28 RX ORDER — INSULIN GLARGINE-YFGN 100 [IU]/ML
INJECTION, SOLUTION SUBCUTANEOUS
Qty: 15 ML | Refills: 1 | Status: SHIPPED | OUTPATIENT
Start: 2025-04-28

## 2025-04-28 RX ORDER — DULAGLUTIDE 4.5 MG/.5ML
4.5 INJECTION, SOLUTION SUBCUTANEOUS
Qty: 4 PEN | Refills: 3 | Status: SHIPPED | OUTPATIENT
Start: 2025-04-28 | End: 2026-04-28

## 2025-04-28 NOTE — PATIENT INSTRUCTIONS
Continue Trulicity 3 mg once weekly for now since a large supply has just been picked up.   Reduce Semglee from 14 to 11 units once daily.   Start repaglinide 1 mg tablet before each meal.   Inject Lyumjev with corrrection scale only before meals. Taking it after meals can result in hypoglycemia.     When Trulicity 3 mg is finished, start Trulicity 4.5 mg once weekly and STOP repaglinide.     Return to clinic in 3 months with labs prior.   Contact office if repaglinide is ineffective. Discussed possibility that recent rise in glucoses are secondary to progression of RYAN into type 1 diabetes.

## 2025-04-28 NOTE — PROGRESS NOTES
CC: This 34 y.o. White female  is here for evaluation of  DM along with comorbidities indicated in the Visit Diagnosis section of this encounter.    HPI: Geni Brownlee was diagnosed with RYAN in Nov 2023.  Metformin started at time of dx.  CARMELINA antibody elevated at 2.23 but c-peptide was detectable. MDI started very soon after dx d/t mild elevation in beta hydroxybutyric acid, per e-consult from Dr. Wilson.     DM COMPLICATIONS: none            Prior visit 11/15/24 virtual visit   A1c is about the same from 5.9 to 5.8%.   She has stopped metformin and started Trulicity. Likes Trulicity. Nausea resolved and now tolerating it well. Denies n/v, constipation, or diarrhea.   She reduced Semglee to 10 units daily.   90 day CGM average 133  She has lost 16 lb since lov. Appetite is lower. Has cut out all alcohol and avoids late night eating. She is meal planning and portions out carb intake. She feels like a different person.   Work is getting busier with holiday season and she is eating out more often than usual.   Plan Increase Trulicity to 3 mg weekly. Reviewed side effects.   Reduce Lantus to 7 units once daily. If blood sugars still dropping less than 80, then stop Lantus.   Continue Lyumjev correction scale. Return to clinic in 3 months with labs.         Interval hx virtual   A1c is up from 5.8% to 7.1%.  90 day CGM average 168   She increased Trulicity to 3 mg after lov and just picked up 3 month supply of Trulicity 3 mg. C/o some constipation and sometimes transient nausea in AM.   Glucoses have been higher since late Jan.   Had a recent URI and has been working a lot of overtime. Denies change in diet. She increased Semglee to 14 units a month ago.   She is taking Lyumjev often.     Reports weight 135 lb, 40 lb weight loss over the last year. Pt has continued to carb count and avoid alcohol.   Feels better with her weight loss and has more energy. Denies polydipsia, polyuria.         LAST DIABETES EDUCATION:  12/2023, 1/2024, 7/2024    HOSPITALIZED FOR DIABETES  -  No.    SIGNIFICANT DIABETES MED HISTORY:   Basal/bolus insulin regimen (based on carb counting) changed to basal + SS at initial ov 12/2023   Metformin ER - nausea   Trulicity - started 8/2024      PRESCRIBED DIABETES MEDICATIONS:   Trulicity 3 mg weekly on Saturdays   Semglee 14 units qhs     Lyumjev pc based on ss: 150-200=+2, 201-250=+4; 251-300=+6; 301-350=+8, over 350=+10 units      Misses medication doses - denies   Rotates injections: abdomen   Changes insulin pen needles: yes      SELF MONITORING BLOOD GLUCOSE: Dexcom G7 CGM clay     CGM interpretation:  Glucoses overall higher, especially in post meal state.  Rapid drops in glucoses occasionally noted due to Lyumjev injections to correct highs after meals.        HYPOGLYCEMIC EPISODES: symptoms start in the 60s. -- shaky   Corrects with gummy bears or hard candy.      CURRENT DIET: tries to eat fairly healthy with good amount of fresh produce most of the time.   Eats 3 meals/day. Breakfast is smallest meal.     Mealtimes are erratic.   Lunch was small fries and grilled chicken tenders, unsweet tea.     CURRENT EXERCISE: none formal     SOCIAL: works at [employer]       There were no vitals taken for this visit.    ROS:   CONSTITUTIONAL: Appetite good, denies fatigue  GI: No nausea, vomiting, constipation; no diarrhea       PHYSICAL EXAM:  GENERAL: Well developed, well nourished. No acute distress.   PSYCH: AAOx3, appropriate mood and affect, conversant, well-groomed. Judgement and insight good.   NEURO: Cranial nerves grossly intact. Speech clear.   CHEST: Respirations even and unlabored.       Hemoglobin A1C   Date Value Ref Range Status   11/08/2024 5.8 (H) 4.0 - 5.6 % Final     Comment:     ADA Screening Guidelines:  5.7-6.4%  Consistent with prediabetes  >or=6.5%  Consistent with diabetes    High levels of fetal hemoglobin interfere with the HbA1C  assay. Heterozygous hemoglobin variants (HbS, HgC,  etc)do  not significantly interfere with this assay.   However, presence of multiple variants may affect accuracy.     07/25/2024 5.9 (H) 4.0 - 5.6 % Final     Comment:     ADA Screening Guidelines:  5.7-6.4%  Consistent with prediabetes  >or=6.5%  Consistent with diabetes    High levels of fetal hemoglobin interfere with the HbA1C  assay. Heterozygous hemoglobin variants (HbS, HgC, etc)do  not significantly interfere with this assay.   However, presence of multiple variants may affect accuracy.     03/15/2024 5.6 4.0 - 5.6 % Final     Comment:     ADA Screening Guidelines:  5.7-6.4%  Consistent with prediabetes  >or=6.5%  Consistent with diabetes    High levels of fetal hemoglobin interfere with the HbA1C  assay. Heterozygous hemoglobin variants (HbS, HgC, etc)do  not significantly interfere with this assay.   However, presence of multiple variants may affect accuracy.       Hemoglobin A1c   Date Value Ref Range Status   04/21/2025 7.1 (H) 4.0 - 5.6 % Final     Comment:     ADA Screening Guidelines:  5.7-6.4%  Consistent with prediabetes  >=6.5%  Consistent with diabetes    High levels of fetal hemoglobin interfere with the HbA1C  assay. Heterozygous hemoglobin variants (HbS, HgC, etc)do  not significantly interfere with this assay.   However, presence of multiple variants may affect accuracy.       Lab Results   Component Value Date    CPEPTIDE 1.27 11/08/2024    GLUTAMICACID 2.22 (H) 11/08/2024    ISLETCELLANT <1:4 11/08/2023        Lab Results   Component Value Date    CHOL 124 11/08/2024    CHOL 149 11/07/2023    CHOL 147 07/09/2020     Lab Results   Component Value Date    HDL 32 (L) 11/08/2024    HDL 24 (L) 11/07/2023    HDL 38 (L) 07/09/2020     Lab Results   Component Value Date    LDLCALC 77.0 11/08/2024    LDLCALC 73.2 11/07/2023    LDLCALC 84.0 07/09/2020     Lab Results   Component Value Date    TRIG 75 11/08/2024    TRIG 259 (H) 11/07/2023    TRIG 125 07/09/2020     Lab Results   Component Value Date     CHOLHDL 25.8 11/08/2024    CHOLHDL 16.1 (L) 11/07/2023    CHOLHDL 25.9 07/09/2020           Component Value Date/Time     (L) 11/07/2023 1008    K 4.2 11/07/2023 1008     11/07/2023 1008    CO2 16 (L) 11/07/2023 1008    BUN 12 11/07/2023 1008    CREATININE 1.1 11/07/2023 1008     (H) 11/07/2023 1008    CALCIUM 9.3 11/07/2023 1008    ALKPHOS 57 11/07/2023 1008    AST 24 11/07/2023 1008    ALT 31 11/07/2023 1008    BILITOT 0.5 11/07/2023 1008    EGFRNORACEVR >60 11/07/2023 1008    ESTGFRAFRICA >60 01/11/2022 1522         Lab Results   Component Value Date    LABMICR 6.0 03/15/2024    CREATRANDUR 144.0 03/15/2024    MICALBCREAT 4.2 03/15/2024             ASSESSMENT and PLAN:    A1C GOAL: < 6.5%     1. 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   Reduce Semglee from 14 to 11 units once daily.   Start repaglinide 1 mg tablet before each meal.   Inject Lyumjev with corrrection scale only before meals. Taking it after meals can result in hypoglycemia.     When Trulicity 3 mg is finished, start Trulicity 4.5 mg once weekly and STOP repaglinide.     Return to clinic in 3 months with labs prior.   Contact office if repaglinide is ineffective. Discussed possibility that recent rise in glucoses are secondary to progression of RYAN into type 1 diabetes.     repaglinide (PRANDIN) 1 MG tablet    dulaglutide (TRULICITY) 4.5 mg/0.5 mL pen injector    insulin glargine-yfgn (SEMGLEE,INSULIN GLARG-YFGN,PEN) 100 unit/mL (3 mL) InPn    Hemoglobin A1C    Glutamic Acid Decarboxylase    C-Peptide    Glucose, Fasting    Microalbumin/Creatinine Ratio, Urine            1. RYAN (latent autoimmune diabetes in adults), managed as type 2  Increase Trulicity to 3 mg weekly. Reviewed side effects.   Reduce Lantus to 7 units once daily. If blood sugars still dropping less than 80, then stop Lantus.   Continue Lyumjev  correction scale.     Return to clinic in 3 months with labs     Hemoglobin A1C    Microalbumin/Creatinine Ratio, Urine      2. Hypertriglyceridemia  Triglycerides improved, now at goal              Orders Placed This Encounter   Procedures    Hemoglobin A1C     Standing Status:   Future     Expected Date:   4/28/2025     Expiration Date:   6/27/2026     Send normal result to authorizing provider's In Basket if patient is active on MyChart::   Yes    Glutamic Acid Decarboxylase     Standing Status:   Future     Expected Date:   4/28/2025     Expiration Date:   6/27/2026     Send normal result to authorizing provider's In Basket if patient is active on MyChart::   Yes    C-Peptide     Standing Status:   Future     Expected Date:   4/28/2025     Expiration Date:   6/27/2026     Send normal result to authorizing provider's In Basket if patient is active on MyChart::   Yes    Glucose, Fasting     Standing Status:   Future     Expected Date:   4/28/2025     Expiration Date:   6/28/2026    Microalbumin/Creatinine Ratio, Urine     Standing Status:   Future     Expected Date:   4/28/2025     Expiration Date:   6/27/2026     Specimen Source:   Urine        Follow up in about 3 months (around 7/28/2025).       The patient location is: [place]   The chief complaint leading to consultation is: type 2 DM     Visit type: audiovisual    Face to Face time with patient: 32 minutes of total time spent on the encounter, which includes face to face time and non-face to face time preparing to see the patient (eg, review of tests), Obtaining and/or reviewing separately obtained history, Documenting clinical information in the electronic or other health record, Independently interpreting results (not separately reported) and communicating results to the patient/family/caregiver, or Care coordination (not separately reported).         Each patient to whom he or she provides medical services by telemedicine is:  (1) informed of the  relationship between the physician and patient and the respective role of any other health care provider with respect to management of the patient; and (2) notified that he or she may decline to receive medical services by telemedicine and may withdraw from such care at any time.    Notes:

## 2025-06-27 ENCOUNTER — PATIENT MESSAGE (OUTPATIENT)
Dept: ENDOCRINOLOGY | Facility: CLINIC | Age: 35
End: 2025-06-27
Payer: COMMERCIAL

## 2025-06-27 DIAGNOSIS — E13.9 LADA (LATENT AUTOIMMUNE DIABETES IN ADULTS), MANAGED AS TYPE 2: ICD-10-CM

## 2025-06-27 RX ORDER — REPAGLINIDE 2 MG/1
2 TABLET ORAL
Qty: 270 TABLET | Refills: 0 | Status: SHIPPED | OUTPATIENT
Start: 2025-06-27 | End: 2026-06-27

## 2025-07-25 ENCOUNTER — LAB VISIT (OUTPATIENT)
Dept: LAB | Facility: OTHER | Age: 35
End: 2025-07-25
Attending: NURSE PRACTITIONER
Payer: COMMERCIAL

## 2025-07-25 DIAGNOSIS — E13.9 LADA (LATENT AUTOIMMUNE DIABETES IN ADULTS), MANAGED AS TYPE 2: ICD-10-CM

## 2025-07-25 LAB
ALBUMIN/CREAT UR: 6.9 UG/MG
C PEPTIDE SERPL-MCNC: 0.55 NG/ML (ref 0.78–5.19)
CREAT UR-MCNC: 130 MG/DL (ref 15–325)
EAG (OHS): 169 MG/DL (ref 68–131)
GLUCOSE P FAST SERPL-MCNC: 144 MG/DL (ref 70–110)
HBA1C MFR BLD: 7.5 % (ref 4–5.6)
MICROALBUMIN UR-MCNC: 9 UG/ML (ref ?–5000)

## 2025-07-25 PROCEDURE — 82947 ASSAY GLUCOSE BLOOD QUANT: CPT

## 2025-07-25 PROCEDURE — 84681 ASSAY OF C-PEPTIDE: CPT

## 2025-07-25 PROCEDURE — 86341 ISLET CELL ANTIBODY: CPT

## 2025-07-25 PROCEDURE — 36415 COLL VENOUS BLD VENIPUNCTURE: CPT

## 2025-07-25 PROCEDURE — 83036 HEMOGLOBIN GLYCOSYLATED A1C: CPT

## 2025-07-25 PROCEDURE — 82043 UR ALBUMIN QUANTITATIVE: CPT

## 2025-08-01 ENCOUNTER — OFFICE VISIT (OUTPATIENT)
Dept: ENDOCRINOLOGY | Facility: CLINIC | Age: 35
End: 2025-08-01
Payer: COMMERCIAL

## 2025-08-01 VITALS
SYSTOLIC BLOOD PRESSURE: 107 MMHG | BODY MASS INDEX: 21.97 KG/M2 | DIASTOLIC BLOOD PRESSURE: 71 MMHG | WEIGHT: 128 LBS | TEMPERATURE: 99 F | HEART RATE: 78 BPM

## 2025-08-01 DIAGNOSIS — E13.9 LADA (LATENT AUTOIMMUNE DIABETES MELLITUS IN ADULTS): Primary | ICD-10-CM

## 2025-08-01 LAB — GAD65 AB SER-SCNC: 1.88 NMOL/L

## 2025-08-01 PROCEDURE — 99999 PR PBB SHADOW E&M-EST. PATIENT-LVL III: CPT | Mod: PBBFAC,,, | Performed by: NURSE PRACTITIONER

## 2025-08-01 RX ORDER — INSULIN GLARGINE-YFGN 100 [IU]/ML
INJECTION, SOLUTION SUBCUTANEOUS
Qty: 15 ML | Refills: 3 | Status: SHIPPED | OUTPATIENT
Start: 2025-08-01 | End: 2025-08-01

## 2025-08-01 RX ORDER — INSULIN LISPRO-AABC 100 [IU]/ML
INJECTION, SOLUTION SUBCUTANEOUS
Qty: 15 PEN | Refills: 3 | Status: SHIPPED | OUTPATIENT
Start: 2025-08-01 | End: 2025-08-01

## 2025-08-01 RX ORDER — INSULIN LISPRO-AABC 100 [IU]/ML
INJECTION, SOLUTION SUBCUTANEOUS
Qty: 15 PEN | Refills: 2 | Status: SHIPPED | OUTPATIENT
Start: 2025-08-01

## 2025-08-01 RX ORDER — DULAGLUTIDE 4.5 MG/.5ML
4.5 INJECTION, SOLUTION SUBCUTANEOUS
Qty: 4 PEN | Refills: 3 | Status: SHIPPED | OUTPATIENT
Start: 2025-08-01 | End: 2025-08-01

## 2025-08-01 RX ORDER — INSULIN GLARGINE-YFGN 100 [IU]/ML
INJECTION, SOLUTION SUBCUTANEOUS
Qty: 15 ML | Refills: 1 | Status: SHIPPED | OUTPATIENT
Start: 2025-08-01

## 2025-08-01 RX ORDER — DULAGLUTIDE 4.5 MG/.5ML
4.5 INJECTION, SOLUTION SUBCUTANEOUS
Qty: 12 PEN | Refills: 1 | Status: SHIPPED | OUTPATIENT
Start: 2025-08-01 | End: 2026-08-01

## 2025-08-01 NOTE — PATIENT INSTRUCTIONS
Continue Semglee at 16 units once daily.   Continue Trulicity 4.5 mg once weekly.     Stop repaglinide.     Start Lyumjev before meals based on carb ratio of 10, correction (sensitivity) factor 50, target glucose 120.     Bolus Guide clay download onto phone.   If carb ratio is too weak and glucoses remain higher than 250 after meals, then change to carb ratio of 8.     Do not inject Lyumjev any later than 30 minutes after eating or else blood sugar can drop too low. Ok to take correction dose 3-4 hours later based on glucose value.     Pt wants to hold off on insulin pump, does not want another device attached to her.     Return to clinic in 3 months with labs prior.

## 2025-08-01 NOTE — PROGRESS NOTES
CC: This 34 y.o. White female  is here for evaluation of  DM along with comorbidities indicated in the Visit Diagnosis section of this encounter.    HPI: Geni Brownlee was diagnosed with RYAN in Nov 2023.  Metformin started at time of dx.  CARMELINA antibody elevated at 2.23 but c-peptide was detectable. MDI started very soon after dx d/t mild elevation in beta hydroxybutyric acid, per e-consult from Dr. Wilson.     DM COMPLICATIONS: none              Prior visit 4/28/25 virtual   A1c is up from 5.8% to 7.1%.  90 day CGM average 168   She increased Trulicity to 3 mg after lov and just picked up 3 month supply of Trulicity 3 mg. C/o some constipation and sometimes transient nausea in AM.   Glucoses have been higher since late Jan.   Had a recent URI and has been working a lot of overtime. Denies change in diet. She increased Semglee to 14 units a month ago.   She is taking Lyumjev often.   Reports weight 135 lb, 40 lb weight loss over the last year. Pt has continued to carb count and avoid alcohol.   Feels better with her weight loss and has more energy. Denies polydipsia, polyuria.   Plan Continue Trulicity 3 mg once weekly for now since a large supply has just been picked up.   Reduce Semglee from 14 to 11 units once daily.   Start repaglinide 1 mg tablet before each meal.   Inject Lyumjev with corrrection scale only before meals. Taking it after meals can result in hypoglycemia.   When Trulicity 3 mg is finished, start Trulicity 4.5 mg once weekly and STOP repaglinide.   Return to clinic in 3 months with labs prior.   Contact office if repaglinide is ineffective. Discussed possibility that recent rise in glucoses are secondary to progression of RYAN into type 1 diabetes.         Interval hx  A1c is up from 7.1% to 7.5%.  BGs have been high. Patient has been taking more Lyumjev.  C-peptide was low at 0.55, paired glucose 144.  CARMELINA antibody continues to be elevated at 1.88.    Pt has increased Trulicity to 4.5 mg and  took 3rd dose yesterday. Nausea has resolved. She gets fully quickly. Finds Trulicity helpful to maintain healthy diet.   She has lost 11 lb since March. She has cut out alcohol and sodas. She counts carbs and most meals are 20-30 gram CHO.  She is exercising. She feels healthier.       She increased Semglee to 16 units 2 weeks ago.   She has increased repaglinide to 2 mg ac. Takes Lyumjev 3-4 units in addition to repaglinide if eating heavy carb.           LAST DIABETES EDUCATION: 12/2023, 1/2024, 7/2024    HOSPITALIZED FOR DIABETES  -  No.    SIGNIFICANT DIABETES MED HISTORY:   Basal/bolus insulin regimen (based on carb counting) changed to basal + SS at initial ov 12/2023   Metformin ER - nausea   Trulicity - started 8/2024      PRESCRIBED DIABETES MEDICATIONS:   Trulicity 4.5 mg weekly on Thursdays    Semglee 14 units qhs   Repaglinide 2 mg ac   Lyumjev pc based on ss: 150-200=+2, 201-250=+4; 251-300=+6; 301-350=+8, over 350=+10 units      Misses medication doses - denies   Rotates injections: abdomen   Changes insulin pen needles: yes      SELF MONITORING BLOOD GLUCOSE: Dexcom G7 CGM clay     CGM interpretation:  Glucoses overall higher especially post meal. No hypoglycemia.               HYPOGLYCEMIC EPISODES: symptoms start in the 60s. -- shaky   Corrects with gummy bears or hard candy.      CURRENT DIET:   Eats 3 meals/day. Breakfast is smallest meal.     Mealtimes are erratic.     CURRENT EXERCISE: cardio at the gym, lifts a lot at work.      SOCIAL: works at [employer]       /71   Pulse 78   Temp 98.5 °F (36.9 °C)   Wt 58.1 kg (128 lb)   BMI 21.97 kg/m²     ROS:   CONSTITUTIONAL: Appetite good, denies fatigue  GI: No nausea, vomiting, constipation; no diarrhea       PHYSICAL EXAM:  GENERAL: Well developed, well nourished. No acute distress.   PSYCH: AAOx3, appropriate mood and affect, conversant, well-groomed. Judgement and insight good.   NEURO: Cranial nerves grossly intact. Speech clear.    CHEST: Respirations even and unlabored.       Hemoglobin A1C   Date Value Ref Range Status   11/08/2024 5.8 (H) 4.0 - 5.6 % Final     Comment:     ADA Screening Guidelines:  5.7-6.4%  Consistent with prediabetes  >or=6.5%  Consistent with diabetes    High levels of fetal hemoglobin interfere with the HbA1C  assay. Heterozygous hemoglobin variants (HbS, HgC, etc)do  not significantly interfere with this assay.   However, presence of multiple variants may affect accuracy.     07/25/2024 5.9 (H) 4.0 - 5.6 % Final     Comment:     ADA Screening Guidelines:  5.7-6.4%  Consistent with prediabetes  >or=6.5%  Consistent with diabetes    High levels of fetal hemoglobin interfere with the HbA1C  assay. Heterozygous hemoglobin variants (HbS, HgC, etc)do  not significantly interfere with this assay.   However, presence of multiple variants may affect accuracy.     03/15/2024 5.6 4.0 - 5.6 % Final     Comment:     ADA Screening Guidelines:  5.7-6.4%  Consistent with prediabetes  >or=6.5%  Consistent with diabetes    High levels of fetal hemoglobin interfere with the HbA1C  assay. Heterozygous hemoglobin variants (HbS, HgC, etc)do  not significantly interfere with this assay.   However, presence of multiple variants may affect accuracy.       Hemoglobin A1c   Date Value Ref Range Status   07/25/2025 7.5 (H) 4.0 - 5.6 % Final     Comment:     ADA Screening Guidelines:  5.7-6.4%  Consistent with prediabetes  >=6.5%  Consistent with diabetes    High levels of fetal hemoglobin interfere with the HbA1C  assay. Heterozygous hemoglobin variants (HbS, HgC, etc)do  not significantly interfere with this assay.   However, presence of multiple variants may affect accuracy.   04/21/2025 7.1 (H) 4.0 - 5.6 % Final     Comment:     ADA Screening Guidelines:  5.7-6.4%  Consistent with prediabetes  >=6.5%  Consistent with diabetes    High levels of fetal hemoglobin interfere with the HbA1C  assay. Heterozygous hemoglobin variants (HbS, HgC,  etc)do  not significantly interfere with this assay.   However, presence of multiple variants may affect accuracy.       Lab Results   Component Value Date    CPEPTIDE 0.55 (L) 07/25/2025    GLUTAMICACID 2.22 (H) 11/08/2024    ISLETCELLANT <1:4 11/08/2023        Lab Results   Component Value Date    CHOL 124 11/08/2024    CHOL 149 11/07/2023    CHOL 147 07/09/2020     Lab Results   Component Value Date    HDL 32 (L) 11/08/2024    HDL 24 (L) 11/07/2023    HDL 38 (L) 07/09/2020     Lab Results   Component Value Date    LDLCALC 77.0 11/08/2024    LDLCALC 73.2 11/07/2023    LDLCALC 84.0 07/09/2020     Lab Results   Component Value Date    TRIG 75 11/08/2024    TRIG 259 (H) 11/07/2023    TRIG 125 07/09/2020     Lab Results   Component Value Date    CHOLHDL 25.8 11/08/2024    CHOLHDL 16.1 (L) 11/07/2023    CHOLHDL 25.9 07/09/2020           Component Value Date/Time     (L) 11/07/2023 1008    K 4.2 11/07/2023 1008     11/07/2023 1008    CO2 16 (L) 11/07/2023 1008    BUN 12 11/07/2023 1008    CREATININE 1.1 11/07/2023 1008     (H) 11/07/2023 1008    CALCIUM 9.3 11/07/2023 1008    ALKPHOS 57 11/07/2023 1008    AST 24 11/07/2023 1008    ALT 31 11/07/2023 1008    BILITOT 0.5 11/07/2023 1008    EGFRNORACEVR >60 11/07/2023 1008    ESTGFRAFRICA >60 01/11/2022 1522         Lab Results   Component Value Date    LABMICR 9.0 07/25/2025    CREATRANDUR 130.0 07/25/2025    MICALBCREAT 6.9 07/25/2025             ASSESSMENT and PLAN:    A1C GOAL: < 6.5%       1. RYAN (latent autoimmune diabetes mellitus in adults)  Continue Semglee at 16 units once daily.   Continue Trulicity 4.5 mg once weekly.     Stop repaglinide.     Start Lyumjev before meals based on carb ratio of 10, correction (sensitivity) factor 50, target glucose 120.     Bolus Guide clay download onto phone.   If carb ratio is too weak and glucoses remain higher than 250 after meals, then change to carb ratio of 8.     Do not inject Lyumjev any later than 30  minutes after eating or else blood sugar can drop too low. Ok to take correction dose 3-4 hours later based on glucose value.     Pt wants to hold off on insulin pump, does not want another device attached to her.     Return to clinic in 3 months with labs prior.       Hemoglobin A1C    Lipid Panel    C-Peptide    Glucose, Fasting    dulaglutide (TRULICITY) 4.5 mg/0.5 mL pen injector    insulin lispro-aabc (LYUMJEV KWIKPEN U-100 INSULIN) 100 unit/mL pen    insulin glargine-yfgn (SEMGLEE,INSULIN GLARG-YFGN,PEN) 100 unit/mL (3 mL) InPn        Orders Placed This Encounter   Procedures    Hemoglobin A1C     Standing Status:   Future     Expected Date:   8/1/2025     Expiration Date:   9/30/2026     Send normal result to authorizing provider's In Basket if patient is active on MyChart::   Yes    Lipid Panel     Standing Status:   Future     Expected Date:   8/1/2025     Expiration Date:   8/1/2026     Send normal result to authorizing provider's In Basket if patient is active on MyChart::   Yes    C-Peptide     Standing Status:   Future     Expected Date:   8/1/2025     Expiration Date:   9/30/2026     Send normal result to authorizing provider's In Basket if patient is active on MyChart::   Yes    Glucose, Fasting     Standing Status:   Future     Expected Date:   8/1/2025     Expiration Date:   10/1/2026        Follow up in about 3 months (around 11/1/2025).